# Patient Record
Sex: MALE | Race: WHITE | NOT HISPANIC OR LATINO | Employment: OTHER | URBAN - METROPOLITAN AREA
[De-identification: names, ages, dates, MRNs, and addresses within clinical notes are randomized per-mention and may not be internally consistent; named-entity substitution may affect disease eponyms.]

---

## 2017-01-11 ENCOUNTER — ANESTHESIA EVENT (OUTPATIENT)
Dept: SURGERY | Facility: HOSPITAL | Age: 42
End: 2017-01-11
Payer: COMMERCIAL

## 2017-01-11 ENCOUNTER — TELEPHONE (OUTPATIENT)
Dept: SURGERY | Facility: CLINIC | Age: 42
End: 2017-01-11

## 2017-01-11 NOTE — ANESTHESIA PREPROCEDURE EVALUATION
01/11/2017  Ryder Mendoza is a 42 y.o., male with a pre-operative diagnosis of Right inguinal hernia [K40.90] who is scheduled for Procedure(s) (LRB):  REPAIR-HERNIA-INGUINAL Open Right with Mesh (Right).     Requested anesthesia type: General  Surgeon: Deniz Ontiveros MD  Allergies: Review of patient's allergies indicates:  No Known Allergies  Vital Sign Range:    Chronic Medications:   No prescriptions prior to admission.     Current Medications:   No current facility-administered medications for this encounter.      No current outpatient prescriptions on file.     Medical History:   Past Medical History   Diagnosis Date    Cataract     Diarrhea 2015     c-diff    Lattice degeneration, both eyes     MRSA (methicillin resistant staph aureus) culture positive     Stickler's syndrome      .    [facility] Anesthesia Evaluation    I have reviewed the Patient Summary Reports.    I have reviewed the Nursing Notes.   I have reviewed the Medications.     Review of Systems  Anesthesia Hx:  No problems with previous Anesthesia  Denies Family Hx of Anesthesia complications.   Denies Personal Hx of Anesthesia complications.   Hematology/Oncology:  Hematology Normal   Oncology Normal     EENT/Dental:EENT/Dental Normal   Cardiovascular:  Cardiovascular Normal     Pulmonary:  Pulmonary Normal    Renal/:  Renal/ Normal     Hepatic/GI:  Hepatic/GI Normal    Musculoskeletal:   Arthritis     Neurological:  Neurology Normal    Endocrine:  Endocrine Normal    Dermatological:  Skin Normal    Psych:  Psychiatric Normal           Physical Exam  General:  Well nourished    Airway/Jaw/Neck:  Airway Findings: Mouth Opening: Normal Tongue: Normal  General Airway Assessment: Adult, Good  Mallampati: I  Improves to I with phonation.  Jaw/Neck Findings:     Neck ROM: Normal ROM      Dental:  Dental Findings: In tact    Chest/Lungs:  Chest/Lungs Findings: Clear to auscultation, Normal Respiratory Rate     Heart/Vascular:  Heart Findings: Rate: Normal  Rhythm: Regular Rhythm  Sounds: Normal     Abdomen:  Abdomen Findings:  Normal, Soft, Nontender            Anesthesia Plan  Type of Anesthesia, risks & benefits discussed:  Anesthesia Type:  general  Patient's Preference: as indicated.  Intra-op Monitoring Plan: standard ASA monitors  Intra-op Monitoring Plan Comments:   Post Op Pain Control Plan:   Post Op Pain Control Plan Comments:   Induction:   IV  Beta Blocker:  Patient is not currently on a Beta-Blocker (No further documentation required).       Informed Consent: Patient understands risks and agrees with Anesthesia plan.  Questions answered. Anesthesia consent signed with patient.  ASA Score: 2     Day of Surgery Review of History & Physical:  There are no significant changes.  H&P update referred to the surgeon.     Anesthesia Plan Notes: Perioperative PONV plan of care developed.         Ready For Surgery From Anesthesia Perspective.

## 2017-01-11 NOTE — TELEPHONE ENCOUNTER
Pt notified to arrive [facility] tomorrow at 7:30am for surgery with Dr. Ontiveros.  Pre-Op instructions reinforced.  He verbalized understanding.

## 2017-01-12 ENCOUNTER — ANESTHESIA (OUTPATIENT)
Dept: SURGERY | Facility: HOSPITAL | Age: 42
End: 2017-01-12
Payer: COMMERCIAL

## 2017-01-12 PROBLEM — K40.90 RIGHT INGUINAL HERNIA: Status: ACTIVE | Noted: 2017-01-12

## 2017-01-12 PROCEDURE — 63600175 PHARM REV CODE 636 W HCPCS: Performed by: NURSE ANESTHETIST, CERTIFIED REGISTERED

## 2017-01-12 PROCEDURE — 25000003 PHARM REV CODE 250: Performed by: ANESTHESIOLOGY

## 2017-01-12 PROCEDURE — 25000003 PHARM REV CODE 250: Performed by: PHYSICIAN ASSISTANT

## 2017-01-12 PROCEDURE — 25000003 PHARM REV CODE 250: Performed by: NURSE ANESTHETIST, CERTIFIED REGISTERED

## 2017-01-12 PROCEDURE — D9220A PRA ANESTHESIA: Mod: CRNA,,, | Performed by: NURSE ANESTHETIST, CERTIFIED REGISTERED

## 2017-01-12 PROCEDURE — D9220A PRA ANESTHESIA: Mod: ANES,,, | Performed by: ANESTHESIOLOGY

## 2017-01-12 PROCEDURE — 27200651 HC AIRWAY, LMA: Performed by: NURSE ANESTHETIST, CERTIFIED REGISTERED

## 2017-01-12 RX ORDER — PROPOFOL 10 MG/ML
INJECTION, EMULSION INTRAVENOUS
Status: DISCONTINUED | OUTPATIENT
Start: 2017-01-12 | End: 2017-01-12

## 2017-01-12 RX ORDER — FENTANYL CITRATE 50 UG/ML
INJECTION, SOLUTION INTRAMUSCULAR; INTRAVENOUS
Status: DISCONTINUED | OUTPATIENT
Start: 2017-01-12 | End: 2017-01-12

## 2017-01-12 RX ORDER — GLYCOPYRROLATE 0.2 MG/ML
INJECTION INTRAMUSCULAR; INTRAVENOUS
Status: DISCONTINUED | OUTPATIENT
Start: 2017-01-12 | End: 2017-01-12

## 2017-01-12 RX ORDER — ONDANSETRON 2 MG/ML
INJECTION INTRAMUSCULAR; INTRAVENOUS
Status: DISCONTINUED | OUTPATIENT
Start: 2017-01-12 | End: 2017-01-12

## 2017-01-12 RX ORDER — MIDAZOLAM HYDROCHLORIDE 1 MG/ML
INJECTION, SOLUTION INTRAMUSCULAR; INTRAVENOUS
Status: DISCONTINUED | OUTPATIENT
Start: 2017-01-12 | End: 2017-01-12

## 2017-01-12 RX ORDER — DEXAMETHASONE SODIUM PHOSPHATE 4 MG/ML
INJECTION, SOLUTION INTRA-ARTICULAR; INTRALESIONAL; INTRAMUSCULAR; INTRAVENOUS; SOFT TISSUE
Status: DISCONTINUED | OUTPATIENT
Start: 2017-01-12 | End: 2017-01-12

## 2017-01-12 RX ORDER — LIDOCAINE HCL/PF 100 MG/5ML
SYRINGE (ML) INTRAVENOUS
Status: DISCONTINUED | OUTPATIENT
Start: 2017-01-12 | End: 2017-01-12

## 2017-01-12 RX ADMIN — DEXAMETHASONE SODIUM PHOSPHATE 8 MG: 4 INJECTION, SOLUTION INTRAMUSCULAR; INTRAVENOUS at 08:01

## 2017-01-12 RX ADMIN — GLYCOPYRROLATE 0.2 MG: 0.2 INJECTION, SOLUTION INTRAMUSCULAR; INTRAVENOUS at 08:01

## 2017-01-12 RX ADMIN — FENTANYL CITRATE 50 MCG: 50 INJECTION, SOLUTION INTRAMUSCULAR; INTRAVENOUS at 09:01

## 2017-01-12 RX ADMIN — MIDAZOLAM HYDROCHLORIDE 2 MG: 1 INJECTION, SOLUTION INTRAMUSCULAR; INTRAVENOUS at 08:01

## 2017-01-12 RX ADMIN — Medication 2 G: at 08:01

## 2017-01-12 RX ADMIN — LIDOCAINE HYDROCHLORIDE 100 MG: 20 INJECTION, SOLUTION INTRAVENOUS at 08:01

## 2017-01-12 RX ADMIN — PROPOFOL 200 MG: 10 INJECTION, EMULSION INTRAVENOUS at 08:01

## 2017-01-12 RX ADMIN — FENTANYL CITRATE 50 MCG: 50 INJECTION, SOLUTION INTRAMUSCULAR; INTRAVENOUS at 08:01

## 2017-01-12 RX ADMIN — ONDANSETRON 4 MG: 2 INJECTION INTRAMUSCULAR; INTRAVENOUS at 09:01

## 2017-01-12 RX ADMIN — SODIUM CHLORIDE, SODIUM ACETATE ANHYDROUS, SODIUM GLUCONATE, POTASSIUM CHLORIDE, AND MAGNESIUM CHLORIDE: 526; 222; 502; 37; 30 INJECTION, SOLUTION INTRAVENOUS at 09:01

## 2017-01-12 NOTE — ANESTHESIA POSTPROCEDURE EVALUATION
"Anesthesia Post Evaluation    Patient: Ryder Mendoza    Procedure(s) Performed: Procedure(s) (LRB):  REPAIR-HERNIA-INGUINAL Open Right with Mesh (Right)    Final Anesthesia Type: general  Patient participation: Yes- Able to Participate  Level of consciousness: awake and alert and oriented  Post-procedure vital signs: reviewed and stable  Pain management: adequate  Airway patency: patent  PONV status at discharge: No PONV  Anesthetic complications: no      Cardiovascular status: stable  Respiratory status: unassisted, spontaneous ventilation and room air  Hydration status: euvolemic  Follow-up not needed.        Visit Vitals    BP (!) 118/59    Pulse 62    Temp 36.6 °C (97.8 °F) (Oral)    Resp 11    Ht 6' 2" (1.88 m)    Wt 99.8 kg (220 lb)    SpO2 96%    BMI 28.25 kg/m2       Pain/Nydia Score: Pain Assessment Performed: Yes (1/12/2017 10:45 AM)  Presence of Pain: complains of pain/discomfort (1/12/2017 10:45 AM)  Pain Rating Prior to Med Admin: 5 (1/12/2017 10:25 AM)  Pain Rating Post Med Admin: 3 (1/12/2017 10:45 AM)  Nydia Score: 10 (1/12/2017 10:45 AM)  Modified Nydia Score: 20 (1/12/2017  8:00 AM)      "

## 2017-01-12 NOTE — ANESTHESIA RELEASE NOTE
"Anesthesia Release from PACU Note    Patient: Ryder Mendoza    Procedure(s) Performed: Procedure(s) (LRB):  REPAIR-HERNIA-INGUINAL Open Right with Mesh (Right)    Anesthesia type: GEN    Post pain: Adequate analgesia reported    Post assessment: no apparent anesthetic complications, tolerated procedure well and no evidence of recall    Post vital signs:   Visit Vitals    BP (!) 118/59    Pulse 62    Temp 36.6 °C (97.8 °F) (Oral)    Resp 11    Ht 6' 2" (1.88 m)    Wt 99.8 kg (220 lb)    SpO2 96%    BMI 28.25 kg/m2       Level of consciousness: awake, alert and oriented    Nausea/Vomiting: no nausea/no vomiting    Complications: none    Airway Patency: patent    Respiratory: unassisted, spontaneous ventilation, room air    Cardiovascular: stable and blood pressure at baseline    Hydration: euvolemic    "

## 2017-01-12 NOTE — TRANSFER OF CARE
"Anesthesia Transfer of Care Note    Patient: Ryder Mendoza    Procedure(s) Performed: Procedure(s) (LRB):  REPAIR-HERNIA-INGUINAL Open Right with Mesh (Right)    Patient location: PACU    Anesthesia Type: general    Transport from OR: Transported from OR on 100% O2 by closed face mask with adequate spontaneous ventilation    Post pain: adequate analgesia    Post assessment: no apparent anesthetic complications and tolerated procedure well    Post vital signs: stable    Level of consciousness: awake, alert and oriented    Nausea/Vomiting: no nausea/vomiting    Complications: none          Last vitals:   Visit Vitals    /68    Pulse 78    Temp 36.6 °C (97.9 °F) (Oral)    Resp 10    Ht 6' 2" (1.88 m)    Wt 99.8 kg (220 lb)    SpO2 98%    BMI 28.25 kg/m2     "

## 2017-01-25 ENCOUNTER — OFFICE VISIT (OUTPATIENT)
Dept: SURGERY | Facility: CLINIC | Age: 42
End: 2017-01-25
Payer: COMMERCIAL

## 2017-01-25 ENCOUNTER — PATIENT MESSAGE (OUTPATIENT)
Dept: INTERNAL MEDICINE | Facility: CLINIC | Age: 42
End: 2017-01-25

## 2017-01-25 VITALS
WEIGHT: 224.88 LBS | DIASTOLIC BLOOD PRESSURE: 76 MMHG | HEART RATE: 64 BPM | HEIGHT: 74 IN | SYSTOLIC BLOOD PRESSURE: 112 MMHG | TEMPERATURE: 98 F | BODY MASS INDEX: 28.86 KG/M2

## 2017-01-25 DIAGNOSIS — Z87.19 S/P INGUINAL HERNIA REPAIR USING SYNTHETIC PATCH: Primary | ICD-10-CM

## 2017-01-25 DIAGNOSIS — Z98.890 S/P INGUINAL HERNIA REPAIR USING SYNTHETIC PATCH: Primary | ICD-10-CM

## 2017-01-25 PROCEDURE — 99999 PR PBB SHADOW E&M-EST. PATIENT-LVL III: CPT | Mod: PBBFAC,,, | Performed by: SURGERY

## 2017-01-25 PROCEDURE — 99024 POSTOP FOLLOW-UP VISIT: CPT | Mod: S$GLB,,, | Performed by: SURGERY

## 2017-01-25 NOTE — PROGRESS NOTES
Patient is 2 weeks s/p right inguinal hernia repair.  Doing well    Incision healed,  Repair strong.   Plan:  Light duty x 4 weeks    F/u prn.

## 2017-02-08 ENCOUNTER — OFFICE VISIT (OUTPATIENT)
Dept: INTERNAL MEDICINE | Facility: CLINIC | Age: 42
End: 2017-02-08
Attending: INTERNAL MEDICINE
Payer: COMMERCIAL

## 2017-02-08 VITALS
HEIGHT: 74 IN | BODY MASS INDEX: 29.14 KG/M2 | HEART RATE: 84 BPM | SYSTOLIC BLOOD PRESSURE: 114 MMHG | WEIGHT: 227.06 LBS | OXYGEN SATURATION: 96 % | DIASTOLIC BLOOD PRESSURE: 76 MMHG

## 2017-02-08 DIAGNOSIS — H91.90 HEARING LOSS, UNSPECIFIED HEARING LOSS TYPE, UNSPECIFIED LATERALITY: ICD-10-CM

## 2017-02-08 DIAGNOSIS — F33.0 MILD EPISODE OF RECURRENT MAJOR DEPRESSIVE DISORDER: ICD-10-CM

## 2017-02-08 DIAGNOSIS — R41.840 POOR CONCENTRATION: Primary | ICD-10-CM

## 2017-02-08 DIAGNOSIS — F41.9 ANXIETY: ICD-10-CM

## 2017-02-08 PROBLEM — K40.90 RIGHT INGUINAL HERNIA: Status: RESOLVED | Noted: 2017-01-12 | Resolved: 2017-02-08

## 2017-02-08 PROCEDURE — 99213 OFFICE O/P EST LOW 20 MIN: CPT | Mod: S$GLB,,, | Performed by: INTERNAL MEDICINE

## 2017-02-08 PROCEDURE — 99999 PR PBB SHADOW E&M-EST. PATIENT-LVL III: CPT | Mod: PBBFAC,,, | Performed by: INTERNAL MEDICINE

## 2017-02-08 RX ORDER — ESCITALOPRAM OXALATE 10 MG/1
10 TABLET ORAL DAILY
Qty: 30 TABLET | Refills: 1 | Status: SHIPPED | OUTPATIENT
Start: 2017-02-08 | End: 2017-03-22 | Stop reason: SDUPTHER

## 2017-02-08 NOTE — PROGRESS NOTES
Subjective:       Patient ID: Ryder Mendoza is a 42 y.o. male.    Chief Complaint: Medication Refill    HPI   Pt here for f/u appt for poor concentration and depression. Reports present for past 10 years. Seeing a SW/therapist x 3 years and attending group therapy which has helped tremendously. Symptoms include Lack of motivation and irritability at times.  Source of stress with children and not working currently as post op from hernia repair.   Has had mild anxiety - no panic attacks. Mostly in social situations.   Has discussed symptoms with family members including wife who is a physician.    Symptoms are stable but he decided to move forward with considering medication to help treat symptoms.   Was on wellbutrin for a short period of time - no effects from this but did not notice any benefit and stopped this after taking for about 3-4 weeks.   No si/hi/clarke.     Review of Systems   Constitutional: Negative for chills and fever.   Respiratory: Negative for chest tightness and shortness of breath.    Cardiovascular: Negative for chest pain and palpitations.   Psychiatric/Behavioral: Positive for decreased concentration and dysphoric mood. Negative for confusion, hallucinations, self-injury, sleep disturbance and suicidal ideas. The patient is nervous/anxious. The patient is not hyperactive.        Objective:      Physical Exam   Constitutional: He is oriented to person, place, and time. He appears well-developed and well-nourished.   HENT:   Head: Normocephalic and atraumatic.   Eyes: Conjunctivae and EOM are normal.   Neck: Neck supple.   Cardiovascular: Normal rate, regular rhythm, normal heart sounds and intact distal pulses.    Pulmonary/Chest: Effort normal and breath sounds normal.   Abdominal: Soft. Bowel sounds are normal.   Musculoskeletal: He exhibits no edema or tenderness.   Lymphadenopathy:     He has no cervical adenopathy.   Neurological: He is alert and oriented to person, place, and time.    Skin: Skin is warm and dry.   Psychiatric: He has a normal mood and affect. His behavior is normal. Judgment and thought content normal.   Vitals reviewed.      Assessment:       Ryder was seen today for medication refill.    Diagnoses and all orders for this visit:    Poor concentration: suspect due to problem 2 and 3    Mild episode of recurrent major depressive disorder: cont counseling, start lexapro. Potential side effects of medication discussed with patient. If the patient has any issues with medication, patient  understands to let me know. Return to clinic and ER prompts given. rtc in 4-6 weeks for med check or sooner prn.     Anxiety: as above    Hearing loss, unspecified hearing loss type, unspecified laterality  -     Ambulatory Referral to ENT    Other orders  -     escitalopram oxalate (LEXAPRO) 10 MG tablet; Take 1 tablet (10 mg total) by mouth once daily.

## 2017-02-08 NOTE — MR AVS SNAPSHOT
[facility]  [address]  [place] [address]  Phone: [phone]  Fax: [phone]                  Ryder Mendoza   2017 1:20 PM   Office Visit    Description:  Male : 1975   Provider:  Zoie Corey MD   Department:  [facility] [place], operated by [facility]           Reason for Visit     Medication Refill           Diagnoses this Visit        Comments    Poor concentration    -  Primary     Mild episode of recurrent major depressive disorder         Anxiety         Hearing loss, unspecified hearing loss type, unspecified laterality                To Do List           Future Appointments        Provider Department Dept Phone    3/22/2017 1:00 PM Zoie Corey MD [facility] Medicine [phone]    3/31/2017 8:30 AM AUDIOGRAM, AUDIO [facility] - Audiology [phone]    3/31/2017 9:30 AM Foster Rosario III, MD [facility] - Otorhinolaryngology [phone]      Goals (5 Years of Data)     None      Follow-Up and Disposition     Return in about 6 weeks (around 3/22/2017), or if symptoms worsen or fail to improve.       These Medications        Disp Refills Start End    escitalopram oxalate (LEXAPRO) 10 MG tablet 30 tablet 1 2017    Take 1 tablet (10 mg total) by mouth once daily. - Oral    Pharmacy: [facility] [address] [place] [address] Ph #: [phone]         [facility] On Call     [facility] On Call Nurse Care Line -  Assistance  Registered nurses in the [facility] On Call Center provide clinical advisement, health education, appointment booking, and other advisory services.  Call for this free service at [phone].             Medications           Message regarding Medications     Verify the changes and/or additions to your medication regime listed below are the same as discussed with your clinician today.  If any of these changes or additions are incorrect, please notify your healthcare  "provider.        START taking these NEW medications        Refills    escitalopram oxalate (LEXAPRO) 10 MG tablet 1    Sig: Take 1 tablet (10 mg total) by mouth once daily.    Class: Normal    Route: Oral           Verify that the below list of medications is an accurate representation of the medications you are currently taking.  If none reported, the list may be blank. If incorrect, please contact your healthcare provider. Carry this list with you in case of emergency.           Current Medications     escitalopram oxalate (LEXAPRO) 10 MG tablet Take 1 tablet (10 mg total) by mouth once daily.           Clinical Reference Information           Your Vitals Were     BP Pulse Height Weight SpO2 BMI    114/76 (BP Location: Left arm, Patient Position: Sitting, BP Method: Manual) 84 6' 2" (1.88 m) 103 kg (227 lb 1.2 oz) 96% 29.15 kg/m2      Blood Pressure          Most Recent Value    BP  114/76      Allergies as of 2/8/2017     No Known Allergies      Immunizations Administered on Date of Encounter - 2/8/2017     None      Orders Placed During Today's Visit      Normal Orders This Visit    Ambulatory Referral to ENT       Language Assistance Services     ATTENTION: Language assistance services are available, free of charge. Please call [phone].      ATENCIÓN: Si habla español, tiene a hinojosa disposición servicios gratuitos de asistencia lingüística. Llame al [phone].     MONICA Ý: N?u b?n nói Ti?ng Vi?t, có các d?ch v? h? tr? ngôn ng? mi?n phí dành cho b?n. G?i s? [phone].         [facility] complies with applicable Federal civil rights laws and does not discriminate on the basis of race, color, national origin, age, disability, or sex.        "

## 2017-03-22 ENCOUNTER — OFFICE VISIT (OUTPATIENT)
Dept: INTERNAL MEDICINE | Facility: CLINIC | Age: 42
End: 2017-03-22
Attending: INTERNAL MEDICINE
Payer: COMMERCIAL

## 2017-03-22 VITALS
SYSTOLIC BLOOD PRESSURE: 110 MMHG | WEIGHT: 233 LBS | BODY MASS INDEX: 29.92 KG/M2 | OXYGEN SATURATION: 95 % | HEART RATE: 91 BPM | DIASTOLIC BLOOD PRESSURE: 72 MMHG

## 2017-03-22 DIAGNOSIS — R41.840 POOR CONCENTRATION: Primary | ICD-10-CM

## 2017-03-22 DIAGNOSIS — F41.1 GAD (GENERALIZED ANXIETY DISORDER): ICD-10-CM

## 2017-03-22 DIAGNOSIS — F33.0 MILD EPISODE OF RECURRENT MAJOR DEPRESSIVE DISORDER: ICD-10-CM

## 2017-03-22 PROCEDURE — 99999 PR PBB SHADOW E&M-EST. PATIENT-LVL III: CPT | Mod: PBBFAC,,, | Performed by: INTERNAL MEDICINE

## 2017-03-22 PROCEDURE — 99213 OFFICE O/P EST LOW 20 MIN: CPT | Mod: S$GLB,,, | Performed by: INTERNAL MEDICINE

## 2017-03-22 RX ORDER — ESCITALOPRAM OXALATE 10 MG/1
10 TABLET ORAL DAILY
Qty: 90 TABLET | Refills: 3 | Status: SHIPPED | OUTPATIENT
Start: 2017-03-22 | End: 2017-06-14 | Stop reason: ALTCHOICE

## 2017-03-22 NOTE — MR AVS SNAPSHOT
[facility]  [address]  [facility] [address]  Phone: [phone]  Fax: [phone]                  Ryder Mendoza   3/22/2017 1:00 PM   Office Visit    Description:  Male : 1975   Provider:  Zoie Corey MD   Department:  [facility]           Reason for Visit     Medication Refill                To Do List           Future Appointments        Provider Department Dept Phone    3/31/2017 8:30 AM AUDIOGRAM, AUDIO [facility] - Audiology [phone]    3/31/2017 9:30 AM Foster Rosario III, MD [facility] - Otorhinolaryngology [phone]      Goals (5 Years of Data)     None      Follow-Up and Disposition     Return in about 8 months (around 2017), or if symptoms worsen or fail to improve.       These Medications        Disp Refills Start End    escitalopram oxalate (LEXAPRO) 10 MG tablet 90 tablet 3 3/22/2017 3/22/2018    Take 1 tablet (10 mg total) by mouth once daily. - Oral    Pharmacy: [facility] #[id number] - [place], [place] - [address] #: [phone]         [facility] On Call     [facility] On Call Nurse Care Line -  Assistance  Registered nurses in the [facility] On Call Center provide clinical advisement, health education, appointment booking, and other advisory services.  Call for this free service at [phone].             Medications           Message regarding Medications     Verify the changes and/or additions to your medication regime listed below are the same as discussed with your clinician today.  If any of these changes or additions are incorrect, please notify your healthcare provider.             Verify that the below list of medications is an accurate representation of the medications you are currently taking.  If none reported, the list may be blank. If incorrect, please contact your healthcare provider. Carry this list with you in case of emergency.           Current Medications     escitalopram oxalate  (LEXAPRO) 10 MG tablet Take 1 tablet (10 mg total) by mouth once daily.           Clinical Reference Information           Your Vitals Were     BP Pulse Weight SpO2 BMI    110/72 (BP Location: Left arm, Patient Position: Sitting, BP Method: Manual) 91 105.7 kg (233 lb 0.4 oz) 95% 29.92 kg/m2      Blood Pressure          Most Recent Value    BP  110/72      Allergies as of 3/22/2017     No Known Allergies      Immunizations Administered on Date of Encounter - 3/22/2017     None      Language Assistance Services     ATTENTION: Language assistance services are available, free of charge. Please call [phone].      ATENCIÓN: Si habla español, tiene a hinojosa disposición servicios gratuitos de asistencia lingüística. Llame al [phone].     CHÚ Ý: N?u b?n nói Ti?ng Vi?t, có các d?ch v? h? tr? ngôn ng? mi?n phí dành cho b?n. G?i s? [phone].         [facility] complies with applicable Federal civil rights laws and does not discriminate on the basis of race, color, national origin, age, disability, or sex.

## 2017-03-22 NOTE — PROGRESS NOTES
Subjective:       Patient ID: Ryder Mendoza is a 42 y.o. male.    Chief Complaint: Medication Refill    HPI     Pt here for med refill for lexapro started for anxiety, depression and poor concentration attributed to those issues. Is still attending counseling. anxiety markedly better. Wife and sister noticed improvement. Less irritabiilty and short tempered. mildly Dec sex drive but not bothersome at this point. Has noticed wt gain. Not as worried about what eating and recently returned from trip    Review of Systems    Objective:      Physical Exam   Constitutional: He is oriented to person, place, and time. He appears well-developed and well-nourished.   HENT:   Head: Normocephalic and atraumatic.   Eyes: Conjunctivae and EOM are normal.   Neck: Neck supple.   Cardiovascular: Normal rate, regular rhythm, normal heart sounds and intact distal pulses.    Pulmonary/Chest: Effort normal and breath sounds normal.   Musculoskeletal: He exhibits no edema or tenderness.   Lymphadenopathy:     He has no cervical adenopathy.   Neurological: He is alert and oriented to person, place, and time.   Skin: Skin is warm and dry.   Psychiatric: He has a normal mood and affect. His behavior is normal. Judgment and thought content normal.   Vitals reviewed.      Assessment:       Ryder was seen today for medication refill.    Diagnoses and all orders for this visit:    Poor concentration    SANYA (generalized anxiety disorder)    Mild episode of recurrent major depressive disorder    Other orders  -     escitalopram oxalate (LEXAPRO) 10 MG tablet; Take 1 tablet (10 mg total) by mouth once daily.    All Improved on lexapro, no si/hi/clarke. cont current med, refills given. If any issues with med in future he agrees to let me know. Cont counseling and reg exercise

## 2017-03-31 ENCOUNTER — INITIAL CONSULT (OUTPATIENT)
Dept: OTOLARYNGOLOGY | Facility: CLINIC | Age: 42
End: 2017-03-31
Payer: COMMERCIAL

## 2017-03-31 ENCOUNTER — CLINICAL SUPPORT (OUTPATIENT)
Dept: AUDIOLOGY | Facility: CLINIC | Age: 42
End: 2017-03-31
Payer: COMMERCIAL

## 2017-03-31 VITALS
DIASTOLIC BLOOD PRESSURE: 70 MMHG | HEIGHT: 74 IN | SYSTOLIC BLOOD PRESSURE: 110 MMHG | TEMPERATURE: 98 F | WEIGHT: 232.81 LBS | BODY MASS INDEX: 29.88 KG/M2 | HEART RATE: 66 BPM

## 2017-03-31 DIAGNOSIS — Q89.8 STICKLER'S SYNDROME: ICD-10-CM

## 2017-03-31 DIAGNOSIS — H90.A31 MIXED CONDUCTIVE AND SENSORINEURAL HEARING LOSS OF RIGHT EAR WITH RESTRICTED HEARING OF LEFT EAR: Primary | ICD-10-CM

## 2017-03-31 DIAGNOSIS — H90.A22 SENSORINEURAL HEARING LOSS OF LEFT EAR WITH RESTRICTED HEARING OF RIGHT EAR: ICD-10-CM

## 2017-03-31 DIAGNOSIS — Z86.69 HISTORY OF PERFORATION OF TYMPANIC MEMBRANE: ICD-10-CM

## 2017-03-31 DIAGNOSIS — H69.93 EUSTACHIAN TUBE DYSFUNCTION, BILATERAL: ICD-10-CM

## 2017-03-31 PROCEDURE — 92557 COMPREHENSIVE HEARING TEST: CPT | Mod: S$GLB,,, | Performed by: AUDIOLOGIST-HEARING AID FITTER

## 2017-03-31 PROCEDURE — 92567 TYMPANOMETRY: CPT | Mod: S$GLB,,, | Performed by: AUDIOLOGIST-HEARING AID FITTER

## 2017-03-31 PROCEDURE — 99999 PR PBB SHADOW E&M-EST. PATIENT-LVL III: CPT | Mod: PBBFAC,,, | Performed by: OTOLARYNGOLOGY

## 2017-03-31 PROCEDURE — 99999 PR PBB SHADOW E&M-EST. PATIENT-LVL I: CPT | Mod: PBBFAC,,,

## 2017-03-31 PROCEDURE — 99203 OFFICE O/P NEW LOW 30 MIN: CPT | Mod: S$GLB,,, | Performed by: OTOLARYNGOLOGY

## 2017-03-31 NOTE — LETTER
April 1, 2017      Zoie Corey MD  [address]  [facility] [address]           [facility]  [address]  [facility] [address]  Phone: [phone]  Fax: [phone]          Patient: Ryder Mendoza   MR Number: [id number]   YOB: 1975   Date of Visit: 3/31/2017       Dear Dr. Zoie Corey:    Thank you for referring Ryder Mendoza to me for evaluation. Attached you will find relevant portions of my assessment and plan of care.    If you have questions, please do not hesitate to call me. I look forward to following Ryder Mendoza along with you.    Sincerely,    Foster Rosario III, MD    Enclosure  CC:  No Recipients    If you would like to receive this communication electronically, please contact [email or web address] or [phone] to request more information on [facility] Link access.    For providers and/or their staff who would like to refer a patient to [facility], please contact us through our one-stop-shop provider referral line, [facility], at [phone].    If you feel you have received this communication in error or would no longer like to receive these types of communications, please e-mail [email or web address]
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
## 2017-03-31 NOTE — MR AVS SNAPSHOT
[facility] - Otorhinolaryngology  [address]  [facility] [address]  Phone: [phone]  Fax: [phone]                  Ryder Mendoza   3/31/2017 9:30 AM   Initial consult    Description:  Male : 1975   Provider:  Foster Rosario III, MD   Department:  [facility] - Otorhinolaryngology           Diagnoses this Visit        Comments    Mixed conductive and sensorineural hearing loss of right ear with restricted hearing of left ear    -  Primary     Sensorineural hearing loss of left ear with restricted hearing of right ear         History of perforation of tympanic membrane         Eustachian tube dysfunction, bilateral         Stickler's syndrome                To Do List           Goals (5 Years of Data)     None      [facility]     [facility] On Call Nurse Care Line -  Assistance  Unless otherwise directed by your provider, please contact [facility], our nurse care line that is available for  assistance.     Registered nurses in the [facility] On Call Center provide: appointment scheduling, clinical advisement, health education, and other advisory services.  Call: [phone] (toll free)               Medications           Message regarding Medications     Verify the changes and/or additions to your medication regime listed below are the same as discussed with your clinician today.  If any of these changes or additions are incorrect, please notify your healthcare provider.             Verify that the below list of medications is an accurate representation of the medications you are currently taking.  If none reported, the list may be blank. If incorrect, please contact your healthcare provider. Carry this list with you in case of emergency.           Current Medications     escitalopram oxalate (LEXAPRO) 10 MG tablet Take 1 tablet (10 mg total) by mouth once daily.           Clinical Reference Information           Your Vitals Were     BP Pulse Temp    110/70 (BP  "Location: Left arm, Patient Position: Sitting, BP Method: Automatic) 66 98.3 °F (36.8 °C) (Tympanic)    Height Weight BMI    6' 2" (1.88 m) 105.6 kg (232 lb 12.9 oz) 29.89 kg/m2      Blood Pressure          Most Recent Value    BP  110/70      Allergies as of 3/31/2017     No Known Allergies      Immunizations Administered on Date of Encounter - 3/31/2017     None      Instructions    Audiometry reviewed: asymmetric AD > AS HL  Pt. is a candidate for hearing amplification for one ( right) or both ears  Copy of audiogram/MDamon BianchiNiall's card/Rx to obtain hearing aid(s) provided  Gentle middle ear insufflation techniques encouraged; E.T. Literature provided  Consultation with Dr. SANTIAGO Cowart encouraged re: AD status prn  Avoid diving/forceful nose blowing  Old records from Dr. Kearney may be helpful  Monitor hearing yearly           Language Assistance Services     ATTENTION: Language assistance services are available, free of charge. Please call [phone].      ATENCIÓN: Si habla rasheed, tiene a hinojosa disposición servicios gratuitos de asistencia lingüística. Llame al [phone].     MONICA Ý: N?u b?n nói Ti?ng Vi?t, có các d?ch v? h? tr? ngôn ng? mi?n phí dành cho b?n. G?i s? [phone].         [facility] complies with applicable Federal civil rights laws and does not discriminate on the basis of race, color, national origin, age, disability, or sex.        "

## 2017-03-31 NOTE — PATIENT INSTRUCTIONS
Audiometry reviewed: asymmetric AD > AS HL  Pt. is a candidate for hearing amplification for one ( right) or both ears  Copy of audiogram/ELIZABETH Tierney's card/Rx to obtain hearing aid(s) provided  Gentle middle ear insufflation techniques encouraged; E.T. Literature provided  Consultation with Dr. SANTIAGO Cowart encouraged re: AD status prn  Avoid diving/forceful nose blowing  Old records from Dr. Kearney may be helpful  Monitor hearing yearly

## 2017-03-31 NOTE — PROGRESS NOTES
Ryder Mendoza was seen in the clinic today for a hearing evaluation. Mr. Mendoza reported hearing loss in his right ear.    Audiological testing revealed a mild to moderate mid to high frequency sensorineural hearing loss in the left ear and a mild to moderate mixed hearing loss in the right ear. A speech reception threshold was obtained at 25 dBHL in the left ear and 40 dBHL in the right ear. Speech discrimination was 100% in the left ear and 92% in the right ear.    Tympanometry revealed hyper-compliant Type A tympanograms in both ears.    Recommendations:  1. Otologic evaluation  2. Annual hearing evaluation  3. Hearing Aid Consult

## 2017-04-24 ENCOUNTER — PATIENT MESSAGE (OUTPATIENT)
Dept: INTERNAL MEDICINE | Facility: CLINIC | Age: 42
End: 2017-04-24

## 2017-04-27 RX ORDER — BUPROPION HYDROCHLORIDE 150 MG/1
150 TABLET ORAL DAILY
Qty: 30 TABLET | Refills: 1 | Status: SHIPPED | OUTPATIENT
Start: 2017-04-27 | End: 2017-06-14 | Stop reason: SDUPTHER

## 2017-04-27 NOTE — TELEPHONE ENCOUNTER
Called and spoke with pt - lexapro effective for depression and SANAY symptoms however reports sx of dec libido - he is attending counseling. Would like to add wellbutrin to see if helps sx and will consider d/c lexapro in 4 weeks if dec libido symptoms persist and mood is still improved with addition of wellbutrin.   Potential side effects of wellbutrin discussed with patient. If the patient has any issues with medication, patient  understands to let me know. Return to clinic and ER prompts given. If doing well on both meds he will rtc in 4 weeks for med check - if still with dec libido he will stop lexapro in 4 weeks and rtc 4 weeks after or sooner for f/u for med check on just wellbutrin tx. All questions were answered and pt verbalized understanding of plan.

## 2017-06-14 ENCOUNTER — PATIENT MESSAGE (OUTPATIENT)
Dept: INTERNAL MEDICINE | Facility: CLINIC | Age: 42
End: 2017-06-14

## 2017-06-14 RX ORDER — BUPROPION HYDROCHLORIDE 150 MG/1
150 TABLET ORAL DAILY
Qty: 30 TABLET | Refills: 6 | Status: SHIPPED | OUTPATIENT
Start: 2017-06-14 | End: 2017-08-25

## 2017-06-28 RX ORDER — BUPROPION HYDROCHLORIDE 150 MG/1
TABLET ORAL
Qty: 30 TABLET | Refills: 1 | Status: SHIPPED | OUTPATIENT
Start: 2017-06-28 | End: 2017-09-21 | Stop reason: SDUPTHER

## 2017-07-24 ENCOUNTER — PATIENT MESSAGE (OUTPATIENT)
Dept: OTOLARYNGOLOGY | Facility: CLINIC | Age: 42
End: 2017-07-24

## 2017-08-25 ENCOUNTER — OFFICE VISIT (OUTPATIENT)
Dept: OTOLARYNGOLOGY | Facility: CLINIC | Age: 42
End: 2017-08-25
Payer: COMMERCIAL

## 2017-08-25 ENCOUNTER — CLINICAL SUPPORT (OUTPATIENT)
Dept: AUDIOLOGY | Facility: CLINIC | Age: 42
End: 2017-08-25
Payer: COMMERCIAL

## 2017-08-25 VITALS — HEIGHT: 74 IN | WEIGHT: 240.31 LBS | BODY MASS INDEX: 30.84 KG/M2

## 2017-08-25 DIAGNOSIS — H90.6 MIXED HEARING LOSS, BILATERAL: Primary | ICD-10-CM

## 2017-08-25 DIAGNOSIS — H90.6 MIXED CONDUCTIVE AND SENSORINEURAL HEARING LOSS OF BOTH EARS: Primary | ICD-10-CM

## 2017-08-25 PROCEDURE — 99999 PR PBB SHADOW E&M-EST. PATIENT-LVL II: CPT | Mod: PBBFAC,,, | Performed by: OTOLARYNGOLOGY

## 2017-08-25 PROCEDURE — 3008F BODY MASS INDEX DOCD: CPT | Mod: S$GLB,,, | Performed by: OTOLARYNGOLOGY

## 2017-08-25 PROCEDURE — 92567 TYMPANOMETRY: CPT | Mod: S$GLB,,, | Performed by: AUDIOLOGIST

## 2017-08-25 PROCEDURE — 92504 EAR MICROSCOPY EXAMINATION: CPT | Mod: S$GLB,,, | Performed by: OTOLARYNGOLOGY

## 2017-08-25 PROCEDURE — 92557 COMPREHENSIVE HEARING TEST: CPT | Mod: S$GLB,,, | Performed by: AUDIOLOGIST

## 2017-08-25 PROCEDURE — 99214 OFFICE O/P EST MOD 30 MIN: CPT | Mod: S$GLB,,, | Performed by: OTOLARYNGOLOGY

## 2017-08-25 NOTE — PROGRESS NOTES
Mr. Mendoza was seen in the clinic today for a hearing evaluation.  He reported that he feels like the hearing in his right ear is worsening.     Audiological testing revealed a mild to moderately severe mixed hearing loss in the right ear and a mild mixed hearing loss in the left ear. A speech reception threshold was obtained at 40 dBHL in the right ear and 25 dBHL in the left ear. Speech discrimination was 96% in the right ear and 100% in the left ear.    Tympanometry revealed a Type A tympanogram in the right ear and a Type Ad tympanogram in the left ear.    Recommendations:  1. Otologic evaluation  2. Annual hearing evaluation  3. Noise protection  4. Hearing aid consultation, pending otologic evaluation
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
## 2017-09-20 ENCOUNTER — PATIENT MESSAGE (OUTPATIENT)
Dept: INTERNAL MEDICINE | Facility: CLINIC | Age: 42
End: 2017-09-20

## 2017-09-21 RX ORDER — BUPROPION HYDROCHLORIDE 150 MG/1
150 TABLET ORAL DAILY
Qty: 90 TABLET | Refills: 2 | Status: SHIPPED | OUTPATIENT
Start: 2017-09-21 | End: 2017-12-19 | Stop reason: SDUPTHER

## 2017-12-19 ENCOUNTER — OFFICE VISIT (OUTPATIENT)
Dept: INTERNAL MEDICINE | Facility: CLINIC | Age: 42
End: 2017-12-19
Attending: INTERNAL MEDICINE
Payer: COMMERCIAL

## 2017-12-19 VITALS
SYSTOLIC BLOOD PRESSURE: 110 MMHG | BODY MASS INDEX: 30.1 KG/M2 | DIASTOLIC BLOOD PRESSURE: 78 MMHG | WEIGHT: 234.56 LBS | HEIGHT: 74 IN | OXYGEN SATURATION: 96 % | HEART RATE: 65 BPM

## 2017-12-19 DIAGNOSIS — F33.0 MILD EPISODE OF RECURRENT MAJOR DEPRESSIVE DISORDER: ICD-10-CM

## 2017-12-19 DIAGNOSIS — F41.1 GAD (GENERALIZED ANXIETY DISORDER): ICD-10-CM

## 2017-12-19 DIAGNOSIS — Z00.00 ANNUAL PHYSICAL EXAM: Primary | ICD-10-CM

## 2017-12-19 DIAGNOSIS — M25.561 RIGHT KNEE PAIN, UNSPECIFIED CHRONICITY: ICD-10-CM

## 2017-12-19 DIAGNOSIS — M76.61 TENDONITIS, ACHILLES, RIGHT: ICD-10-CM

## 2017-12-19 DIAGNOSIS — R41.840 POOR CONCENTRATION: ICD-10-CM

## 2017-12-19 PROCEDURE — 99999 PR PBB SHADOW E&M-EST. PATIENT-LVL III: CPT | Mod: PBBFAC,,, | Performed by: INTERNAL MEDICINE

## 2017-12-19 PROCEDURE — 99396 PREV VISIT EST AGE 40-64: CPT | Mod: S$GLB,,, | Performed by: INTERNAL MEDICINE

## 2017-12-19 RX ORDER — BUPROPION HYDROCHLORIDE 150 MG/1
150 TABLET ORAL DAILY
Qty: 90 TABLET | Refills: 3 | Status: SHIPPED | OUTPATIENT
Start: 2017-12-19 | End: 2018-06-27 | Stop reason: SDUPTHER

## 2017-12-19 NOTE — PROGRESS NOTES
"Subjective:   Patient ID: Ryder Mendoza is a 42 y.o. male  Chief complaint:   Chief Complaint   Patient presents with    Annual Exam       HPI  Pt here for annual exam.   Has hx of SANYA and depression - well controlled on wellbutrin with no SE. Mood is good. Concentration improved and less irritable   Doing well, exercising    Review of Systems   Constitutional: Negative for chills and fever.   HENT: Negative for rhinorrhea and sore throat.    Eyes: Negative for pain and visual disturbance.   Respiratory: Negative for cough, shortness of breath and wheezing.    Cardiovascular: Negative for chest pain and palpitations.   Gastrointestinal: Negative for abdominal pain, constipation and diarrhea.   Genitourinary: Negative for dysuria and hematuria.   Musculoskeletal: Negative for back pain and joint swelling.   Skin: Negative for color change and rash.   Neurological: Negative for dizziness and headaches.   Psychiatric/Behavioral: Negative for decreased concentration, dysphoric mood, hallucinations, self-injury, sleep disturbance and suicidal ideas. The patient is not nervous/anxious.      Objective:  Vitals:    12/19/17 0811   BP: 110/78   Pulse: 65   SpO2: 96%   Weight: 106.4 kg (234 lb 9.1 oz)   Height: 6' 2" (1.88 m)     Body mass index is 30.12 kg/m².    Physical Exam   Constitutional: He is oriented to person, place, and time. He appears well-developed and well-nourished.   HENT:   Head: Normocephalic and atraumatic.   Right Ear: External ear normal.   Left Ear: External ear normal.   Nose: Nose normal.   Mouth/Throat: Oropharynx is clear and moist.   No carotid bruits   Eyes: Conjunctivae and EOM are normal.   Neck: Neck supple. No thyromegaly present.   Cardiovascular: Normal rate, regular rhythm, normal heart sounds and intact distal pulses.    Pulmonary/Chest: Effort normal and breath sounds normal.   Abdominal: Soft. Bowel sounds are normal.   Musculoskeletal: He exhibits no edema or tenderness. "   Lymphadenopathy:     He has no cervical adenopathy.   Neurological: He is alert and oriented to person, place, and time.   Skin: Skin is warm and dry. Capillary refill takes less than 2 seconds.   Psychiatric: His behavior is normal. Thought content normal.   Vitals reviewed.      Assessment:  1. Annual physical exam    2. SANYA (generalized anxiety disorder)    3. Mild episode of recurrent major depressive disorder    4. Poor concentration    5. Tendonitis, Achilles, right    6. Right knee pain, unspecified chronicity        Plan:  Ryder was seen today for annual exam.    Diagnoses and all orders for this visit:    SANYA (generalized anxiety disorder)  Stable, cont exercise    Mild episode of recurrent major depressive disorder  Controlled, cont wellbutrin  No si/hi/clarke    Poor concentration: improved with wellbutrin - stable    Annual physical exam  Labs ordered, Recommend daily sunscreen, cardiovascular exercise min 30 min 5 days per week. Seatbelts routinely.    Other orders  -     buPROPion (WELLBUTRIN XL) 150 MG TB24 tablet; Take 1 tablet (150 mg total) by mouth once daily.    Health Maintenance   Topic Date Due    Lipid Panel  12/05/2021    TETANUS VACCINE  12/02/2026    Influenza Vaccine  Completed

## 2017-12-21 ENCOUNTER — LAB VISIT (OUTPATIENT)
Dept: LAB | Facility: OTHER | Age: 42
End: 2017-12-21
Attending: INTERNAL MEDICINE
Payer: COMMERCIAL

## 2017-12-21 DIAGNOSIS — Z00.00 ANNUAL PHYSICAL EXAM: ICD-10-CM

## 2017-12-21 LAB
ALBUMIN SERPL BCP-MCNC: 3.8 G/DL
ALP SERPL-CCNC: 52 U/L
ALT SERPL W/O P-5'-P-CCNC: 22 U/L
ANION GAP SERPL CALC-SCNC: 7 MMOL/L
AST SERPL-CCNC: 18 U/L
BILIRUB SERPL-MCNC: 0.4 MG/DL
BUN SERPL-MCNC: 18 MG/DL
CALCIUM SERPL-MCNC: 9.1 MG/DL
CHLORIDE SERPL-SCNC: 105 MMOL/L
CHOLEST SERPL-MCNC: 215 MG/DL
CHOLEST/HDLC SERPL: 4.1 {RATIO}
CO2 SERPL-SCNC: 27 MMOL/L
CREAT SERPL-MCNC: 1.1 MG/DL
EST. GFR  (AFRICAN AMERICAN): >60 ML/MIN/1.73 M^2
EST. GFR  (NON AFRICAN AMERICAN): >60 ML/MIN/1.73 M^2
GLUCOSE SERPL-MCNC: 98 MG/DL
HDLC SERPL-MCNC: 53 MG/DL
HDLC SERPL: 24.7 %
LDLC SERPL CALC-MCNC: 141.6 MG/DL
NONHDLC SERPL-MCNC: 162 MG/DL
POTASSIUM SERPL-SCNC: 4 MMOL/L
PROT SERPL-MCNC: 6.9 G/DL
SODIUM SERPL-SCNC: 139 MMOL/L
TRIGL SERPL-MCNC: 102 MG/DL
TSH SERPL DL<=0.005 MIU/L-ACNC: 2.79 UIU/ML

## 2017-12-21 PROCEDURE — 84443 ASSAY THYROID STIM HORMONE: CPT

## 2017-12-21 PROCEDURE — 80061 LIPID PANEL: CPT

## 2017-12-21 PROCEDURE — 80053 COMPREHEN METABOLIC PANEL: CPT

## 2017-12-21 PROCEDURE — 36415 COLL VENOUS BLD VENIPUNCTURE: CPT

## 2018-01-04 ENCOUNTER — CLINICAL SUPPORT (OUTPATIENT)
Dept: REHABILITATION | Facility: HOSPITAL | Age: 43
End: 2018-01-04
Attending: INTERNAL MEDICINE
Payer: COMMERCIAL

## 2018-01-04 DIAGNOSIS — M25.571 RIGHT ANKLE PAIN, UNSPECIFIED CHRONICITY: ICD-10-CM

## 2018-01-04 DIAGNOSIS — M25.561 RIGHT KNEE PAIN, UNSPECIFIED CHRONICITY: ICD-10-CM

## 2018-01-04 PROCEDURE — 97140 MANUAL THERAPY 1/> REGIONS: CPT

## 2018-01-04 PROCEDURE — 97161 PT EVAL LOW COMPLEX 20 MIN: CPT

## 2018-01-04 NOTE — PLAN OF CARE
Physical Therapy Initial Evaluation     Name: Ryder Gamboae  Clinic Number: [id number]    Ryder is a 43 y.o. male evaluated on 01/04/2018.     Diagnosis:   Encounter Diagnoses   Name Primary?    Right knee pain, unspecified chronicity     Right ankle pain, unspecified chronicity      Physician: Zoie Corey MD  Treatment Orders: PT Eval and Treat    Past Medical History:   Diagnosis Date    Cataract     Diarrhea 2015    c-diff    Lattice degeneration, both eyes     MRSA (methicillin resistant staph aureus) culture positive     Stickler's syndrome      Current Outpatient Prescriptions   Medication Sig    buPROPion (WELLBUTRIN XL) 150 MG TB24 tablet Take 1 tablet (150 mg total) by mouth once daily.     No current facility-administered medications for this visit.      Review of patient's allergies indicates:  No Known Allergies  Precautions: standard    Time In: 8:30 am   Time Out: 9:25 am     Subjective     Patient reports in high school he played competitive soccer and had knee/ankle issues. Pt reports he did moderate exercise since then. Pt reports 3-4 years ago he started playing competitive soccer again and started noticing pain in mid calf B (R>L). Pt reports the discomfort is deep in the calf. Pt reports he stopped playing soccer, but has been exercising at [employer] and playing tennis. Pt reports the pain has returned since playing tennis and working out at [employer]. Pt reports calf muscles feel very tight int he mornings towards the lower calf/achilles tendon. Pt reports occasional sharp pains in the R anterior knee. Pt reports he recently had posterior R knee after duck walking in the attic taking down [employer] decorations. Pt reports the knee is more likely to flare up after physical activity, but can also occur with rest. No history of surgery or injections on the knee or ankle. Other co morbidities include inguinal hernia  "surgery last year, appendectomy 3 years ago, Sticklers syndrome, anxiety/depression.   Diagnostic Imaging: none  Onset: gradual  Popping/clicking: yes  Lower extremity gives way: denies  Locking episodes: denies    Pain Scale: Ryder rates pain on a scale of 0-10 to be 7 at worst; 2 currently; 0 at best .  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)  Patient Goals: minimize pain, return to PLOF    Objective     Observation: B pes planus with hallux valgus     Palpation: TTP R mid calf    Range of Motion: Ankle (degrees)  B ankle A/PROM WNL    Strength: Ankle    Left Right   Gastrocnemius 4+/5 4+/5   Soleus 5/5 5/5   Dorsiflexion 5/5 5/5   Inversion 5/5 4+/5   Eversion 5/5 5/5     Range of Motion: Knee (degrees)   Left Right   Flexion: 140 135 posterior knee pain   Extension 0 0     Strength: Knee   Left Right   Quadriceps 5/5 5/5   Hamstrings 5/5 5/5       Strength: Hip    Left Right   Iliopsoas 5/5 5/5   PGM 5/5 5/5   IR 5/5 5/5   ER 5/5 5/5   Ext 4+/5 4+/5     decreased flexibility B gastroc/soleus complex and hamstring     Special Tests: Knee  All ligamentous testing is negative    Edema:  Left: absent  Right: absent    Gait: Reji ambulated with none.  Level of Assistance: independent  Patient displays mild decrease in push off and heel strike.   Functional Tests: mild pain with R single heel raise    TREATMENT     PT Evaluation Completed? Yes  Discussed Plan of Care with patient: Yes    Ryder received 3 minutes of therapeutic exercise including:   gastroc stretch on wedge 3x30"    Ryder received 10 minutes of manual therapy including:  Instrument assisted STM to R calf and distal hamstring     Written Home Exercises Provided: gastroc stretch, soleus stretch, hamstring stretch (see handout)   Ryder demonstrated good understanding of the education provided. Patient " demonstrated good return demonstration of skill of exercises.    ASSESSMENT     History  Co-morbidities and personal factors that may impact the plan of care Examination  Body Structures and Functions, activity limitations and participation restrictions that may impact the plan of care    Clinical Presentation   Co-morbidities:   anxiety and depression  Sticklers syndrome      Personal Factors:   Anxiety/depression  Body Regions:   lower extremities    Body Systems:    gross symmetry  ROM  strength  gross coordinated movement  balance  gait            Participation Restrictions:   Pain with activities such as sports, pain at rest     Activity limitations:   Learning and applying knowledge  no deficits    General Tasks and Commands  no deficits    Communication  no deficits    Mobility  walking    Self care  no deficits    Domestic Life  no deficits    Interactions/Relationships  no deficits    Life Areas  no deficits    Community and Social Life  no deficits         stable and uncomplicated                      low   low  low Decision Making/ Complexity Score:  low     Pt presents with discomfort in R knee and B calf (R>L), decreased strength, decreased ROM, decreased flexibility, and subsequent functional limitations. Pt will benefit from PT to address these deficits. Pt tolerated tx well today with no adverse reaction to IASTYM.   Pt prognosis is Excellent.  Pt will benefit from skilled outpatient physical therapy to address the above stated deficits, provide pt/family education and to maximize pt's level of independence.     Medical necessity is demonstrated by the following IMPAIRMENTS/PROBLEMS:  1. Increased Pain  2. Decreased Segmental Mobility & Decreased ROM  3. Decreased Core & BLE strength  4. Decreased Flexibility BLE  5. Decreased Tolerance to Functional Activities    Pt's spiritual, cultural and educational needs considered and pt agreeable to plan of care and goals as stated below:     Anticipated  Barriers for physical therapy: chronic pain    Short Term GOALS: 3 weeks. Pt agrees with goals set.  1. Patient demonstrates independence with HEP.   2. Patient demonstrates independence with Postural Awareness.   3. Patient demonstrates independence with body mechanics.     Long Term GOALS: 6 weeks. Pt agrees with goals set.  1. Patient demonstrates increased R knee FLEX to 140 degrees with no pain to improve tolerance to functional activities pain free.   2. Patient demonstrates increased strength BLE's to 5/5 or greater to improve tolerance to functional activities pain free.   3. Patient demonstrates improved overall function per FOTO Knee Survey to 18% Limitation or less.     Functional Limitations Reports   Tool: FOTO Knee Survey  Score: 28% Limitation    PLAN     Outpatient physical therapy 2 times weekly to include: pt ed, hep, therapeutic exercises, neuromuscular re-education/ balance exercises, joint mobilizations, aquatic therapy and modalities prn. Cont PT for  6 weeks. Pt may be seen by PTA as part of the rehabilitation team.     Therapist: Chano Ríos, PT  1/4/2018

## 2018-01-08 ENCOUNTER — CLINICAL SUPPORT (OUTPATIENT)
Dept: REHABILITATION | Facility: HOSPITAL | Age: 43
End: 2018-01-08
Attending: INTERNAL MEDICINE
Payer: COMMERCIAL

## 2018-01-08 DIAGNOSIS — M25.571 RIGHT ANKLE PAIN, UNSPECIFIED CHRONICITY: ICD-10-CM

## 2018-01-08 DIAGNOSIS — M25.561 RIGHT KNEE PAIN, UNSPECIFIED CHRONICITY: Primary | ICD-10-CM

## 2018-01-08 PROCEDURE — 97110 THERAPEUTIC EXERCISES: CPT

## 2018-01-08 NOTE — PROGRESS NOTES
"                                                    Physical Therapy Daily Note     Name: Ryder Gamboae  Clinic Number: [id number]  Diagnosis:   Encounter Diagnoses   Name Primary?    Right knee pain, unspecified chronicity Yes    Right ankle pain, unspecified chronicity      Physician: Zoie Corey MD  Precautions: none  Visit #: 2 of 12  PTA Visit #: 1  Time In: 9:33 am  Time Out: 10:38  Total Treatment Time 1:1: 65    Evaluation Date: 1/4/18  Plan of care Expiration: 2/15/18      Subjective     Pt reports: knee and ankle ok, calf is more chronic. No pain right now  Pain Scale: Ryder rates pain at R knee/calf on a scale of 0-10 to be 0 currently.    Objective     Ryder received individual therapeutic exercises to develop strength, endurance, ROM, flexibility and core stabilization for 60 minutes including:  Bike x 5 min  Gastroc S 2x1'  Shuttle 4 cords 2x10  SL shuttle R 2 cords 2x10  Rebounder R fwd/side 20x ea.  Crabwalks RTB 1 lap  Steamboats RTB 2x10   Iso wall squat w/ RTB around LEs 3x15"  R Step up on airex 2x10    R HS S 2x1'  Bridge 2x10  B SL Clams 2x10  R Ankle DF,PF,IV,EV GTB 2x10  B Prone hip ext (focus on glut activation before HS activation) 2x10              Ryder received the following manual therapy techniques: Soft tissue Mobilization were applied to the: R gastroc for 5 minutes including:  Instrument assist soft tissue mob to R gastroc     The patient received the following direct contact modalities after being cleared for contraindications:NT        Written Home Exercises Provided: HS S  Pt demo good understanding of the education provided. Ryder demonstrated good return demonstration of activities.     Education provided re: LESVIA Soler verbalized good understanding of education provided.   No spiritual or educational barriers to learning provided    Assessment     Patient tolerated christel well w/o adverse reaction. Some hip fatigue w/ crabwalks and steamboats. Crepitus R knee " w/ step ups. VC needed to activate glut before HS w/ prone hip ext. Soft tissue restrictions medial gastroc during MT.   This is a 43 y.o. male referred to outpatient physical therapy and presents with a medical diagnosis of R knee pain and demonstrates limitations as described in the problem list. Pt prognosis is Excellent. Pt will continue to benefit from skilled outpatient physical therapy to address the deficits listed in the problem list, provide pt/family education and to maximize pt's level of independence in the home and community environment.     Goals as follows:  Short Term GOALS: 3 weeks. Pt agrees with goals set.  1. Patient demonstrates independence with HEP.   2. Patient demonstrates independence with Postural Awareness.   3. Patient demonstrates independence with body mechanics.      Long Term GOALS: 6 weeks. Pt agrees with goals set.  1. Patient demonstrates increased R knee FLEX to 140 degrees with no pain to improve tolerance to functional activities pain free.   2. Patient demonstrates increased strength BLE's to 5/5 or greater to improve tolerance to functional activities pain free.   3. Patient demonstrates improved overall function per FOTO Knee Survey to 18% Limitation or less.      Plan     Continue with established Plan of Care towards PT goals.    Therapist: Conchita Gilliland PTA  1/8/2018     Chano Ríos PT and Conchita Gilliland PTA met face to face to discuss patient's treatment plan and progress towards established goals.  Treatment will be continued as described in initial report/eval and progress notes. Patient will be seen by physical therapist every sixth visit and minimally once per month.

## 2018-01-11 ENCOUNTER — CLINICAL SUPPORT (OUTPATIENT)
Dept: REHABILITATION | Facility: HOSPITAL | Age: 43
End: 2018-01-11
Attending: INTERNAL MEDICINE
Payer: COMMERCIAL

## 2018-01-11 DIAGNOSIS — M25.571 RIGHT ANKLE PAIN, UNSPECIFIED CHRONICITY: ICD-10-CM

## 2018-01-11 DIAGNOSIS — M25.561 RIGHT KNEE PAIN, UNSPECIFIED CHRONICITY: Primary | ICD-10-CM

## 2018-01-11 NOTE — PROGRESS NOTES
"                                                    Physical Therapy Daily Note     Name: Ryder Mendoza  Clinic Number: [id number]  Diagnosis:   Encounter Diagnoses   Name Primary?    Right knee pain, unspecified chronicity Yes    Right ankle pain, unspecified chronicity      Physician: Zoie Corey MD  Precautions: none  Visit #: 3 of 12  PTA Visit #: 0  Time In: 11:00 am  Time Out: 11:55 a  Total Treatment Time 1:1: 55    Evaluation Date: 1/4/18  Plan of care Expiration: 2/15/18      Subjective     Pt reports: He is not having discomfort in the knee or ankle but does have some tightness in the hips from last visit.  Pain Scale: Ryder rates pain at R knee/calf on a scale of 0-10 to be 0 currently.    Objective     Ryder received individual therapeutic exercises to develop strength, endurance, ROM, flexibility and core stabilization for 60 minutes including:  Bike x 5 min  Gastroc S 2x1'  Shuttle 4 cords 2x10  SL shuttle R 2 cords 2x10  Rebounder R fwd/side 20x ea.  Crabwalks RTB 1 lap  Steamboats RTB 2x10   Iso wall squat w/ RTB around LEs 3x15"  R Step up on airex + L2 step 2x10    R HS S 2x1' (standing)  Bridge 2x10 - NT  B SL Clams 2x10 - NT  R Ankle DF,PF,IV,EV GTB 2x10 -NT  B Prone hip ext (focus on glut activation before HS activation) 2x10          Ryder received the following manual therapy techniques: Soft tissue Mobilization were applied to the: R gastroc for 5 minutes including:  Instrument assist soft tissue mob to R gastroc     The patient received the following direct contact modalities after being cleared for contraindications:NT        Written Home Exercises Provided: HS S  Pt demo good understanding of the education provided. Ryder demonstrated good return demonstration of activities.     Education provided re: LESVIA Soler verbalized good understanding of education provided.   No spiritual or educational barriers to learning provided    Assessment     Patient tolerated christel well " w/o adverse reaction. Pt was able to complete exercises without c/o pain or fatigue. Will continue to progress as tolerated.  This is a 43 y.o. male referred to outpatient physical therapy and presents with a medical diagnosis of R knee pain and demonstrates limitations as described in the problem list. Pt prognosis is Excellent. Pt will continue to benefit from skilled outpatient physical therapy to address the deficits listed in the problem list, provide pt/family education and to maximize pt's level of independence in the home and community environment.     Goals as follows:  Short Term GOALS: 3 weeks. Pt agrees with goals set.  1. Patient demonstrates independence with HEP.   2. Patient demonstrates independence with Postural Awareness.   3. Patient demonstrates independence with body mechanics.      Long Term GOALS: 6 weeks. Pt agrees with goals set.  1. Patient demonstrates increased R knee FLEX to 140 degrees with no pain to improve tolerance to functional activities pain free.   2. Patient demonstrates increased strength BLE's to 5/5 or greater to improve tolerance to functional activities pain free.   3. Patient demonstrates improved overall function per FOTO Knee Survey to 18% Limitation or less.      Plan     Continue with established Plan of Care towards PT goals.    Therapist: Roula Wakefield, PT  1/11/2018

## 2018-06-27 DIAGNOSIS — F32.A DEPRESSION, UNSPECIFIED DEPRESSION TYPE: Primary | ICD-10-CM

## 2018-06-27 RX ORDER — BUPROPION HYDROCHLORIDE 150 MG/1
150 TABLET ORAL DAILY
Qty: 90 TABLET | Refills: 3 | Status: SHIPPED | OUTPATIENT
Start: 2018-06-27 | End: 2018-09-28 | Stop reason: SDUPTHER

## 2018-09-17 ENCOUNTER — OFFICE VISIT (OUTPATIENT)
Dept: OTOLARYNGOLOGY | Facility: CLINIC | Age: 43
End: 2018-09-17
Payer: COMMERCIAL

## 2018-09-17 ENCOUNTER — CLINICAL SUPPORT (OUTPATIENT)
Dept: AUDIOLOGY | Facility: CLINIC | Age: 43
End: 2018-09-17
Payer: COMMERCIAL

## 2018-09-17 VITALS — WEIGHT: 240.75 LBS | HEIGHT: 74 IN | BODY MASS INDEX: 30.9 KG/M2

## 2018-09-17 DIAGNOSIS — H69.91 DYSFUNCTION OF RIGHT EUSTACHIAN TUBE: ICD-10-CM

## 2018-09-17 DIAGNOSIS — H90.A11 CONDUCTIVE HEARING LOSS OF RIGHT EAR WITH RESTRICTED HEARING OF LEFT EAR: ICD-10-CM

## 2018-09-17 DIAGNOSIS — H90.A31 MIXED CONDUCTIVE AND SENSORINEURAL HEARING LOSS OF RIGHT EAR WITH RESTRICTED HEARING OF LEFT EAR: Primary | ICD-10-CM

## 2018-09-17 PROCEDURE — 92557 COMPREHENSIVE HEARING TEST: CPT | Mod: S$GLB,,, | Performed by: AUDIOLOGIST-HEARING AID FITTER

## 2018-09-17 PROCEDURE — 92504 EAR MICROSCOPY EXAMINATION: CPT | Mod: S$GLB,,, | Performed by: OTOLARYNGOLOGY

## 2018-09-17 PROCEDURE — 99999 PR PBB SHADOW E&M-EST. PATIENT-LVL I: CPT | Mod: PBBFAC,,,

## 2018-09-17 PROCEDURE — 3008F BODY MASS INDEX DOCD: CPT | Mod: CPTII,S$GLB,, | Performed by: OTOLARYNGOLOGY

## 2018-09-17 PROCEDURE — 99999 PR PBB SHADOW E&M-EST. PATIENT-LVL II: CPT | Mod: PBBFAC,,, | Performed by: OTOLARYNGOLOGY

## 2018-09-17 PROCEDURE — 99212 OFFICE O/P EST SF 10 MIN: CPT | Mod: S$GLB,,, | Performed by: OTOLARYNGOLOGY

## 2018-09-17 NOTE — PROGRESS NOTES
Ryder Mendoza was seen in the clinic today for an annual hearing evaluation. Mr. Mendoza reported hearing loss worse in his right ear.      Audiological testing revealed a mild mid frequency sensorineural hearing loss in the left ear and a mild to moderate mixed hearing loss in the right ear. A speech reception threshold was obtained at 20 dBHL in the left ear and 35 dBHL in the right ear. Speech recognition was 100%, bilaterally.    Recommendations:  1. Otologic evaluation  2. Annual hearing evaluation  3. Hearing aid consult
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
## 2018-09-27 ENCOUNTER — PATIENT MESSAGE (OUTPATIENT)
Dept: INTERNAL MEDICINE | Facility: CLINIC | Age: 43
End: 2018-09-27

## 2018-09-28 DIAGNOSIS — F32.A DEPRESSION, UNSPECIFIED DEPRESSION TYPE: ICD-10-CM

## 2018-09-28 RX ORDER — BUPROPION HYDROCHLORIDE 150 MG/1
150 TABLET ORAL DAILY
Qty: 90 TABLET | Refills: 3 | Status: SHIPPED | OUTPATIENT
Start: 2018-09-28 | End: 2018-12-04 | Stop reason: SDUPTHER

## 2018-10-26 ENCOUNTER — IMMUNIZATION (OUTPATIENT)
Dept: PHARMACY | Facility: CLINIC | Age: 43
End: 2018-10-26
Payer: COMMERCIAL

## 2018-12-03 ENCOUNTER — PATIENT MESSAGE (OUTPATIENT)
Dept: INTERNAL MEDICINE | Facility: CLINIC | Age: 43
End: 2018-12-03

## 2018-12-03 DIAGNOSIS — F32.A DEPRESSION, UNSPECIFIED DEPRESSION TYPE: ICD-10-CM

## 2018-12-04 RX ORDER — BUPROPION HYDROCHLORIDE 150 MG/1
150 TABLET ORAL DAILY
Qty: 90 TABLET | Refills: 0 | Status: SHIPPED | OUTPATIENT
Start: 2018-12-04 | End: 2019-04-01 | Stop reason: SDUPTHER

## 2018-12-04 NOTE — TELEPHONE ENCOUNTER
Sent message to inform pt that script was sent to the pharmacy. Informed pt that annual exam is due and gave him Dr. Corey's recommendations about his insurance

## 2018-12-04 NOTE — TELEPHONE ENCOUNTER
Refill given for 90 day supply  Pt due for annual - please arrange     rec he discuss obtaining a partial or entire refill with pharmacy to determine what his insurance will allow so he can travel with prescription

## 2018-12-11 ENCOUNTER — DOCUMENTATION ONLY (OUTPATIENT)
Dept: REHABILITATION | Facility: HOSPITAL | Age: 43
End: 2018-12-11

## 2018-12-11 NOTE — DISCHARGE SUMMARY
PHYSICAL THERAPY DISCHARGE:    This patient was originally evaluated at our facility on: 1/4/18    Pt attended PT for a total of 3 Visits receiving: Manual Therapy, Therex, Postural Education, Body Mechanics Training, Home Exercise Instruction     This patient's last visit occurred on: 1/11/18    Short term goals: not met    Long term goals: not met    Pt was DC'd from our care secondary to: expiration of plan of care       Therapist: Gt Pablo, PT,DPT  12/11/2018

## 2019-04-01 DIAGNOSIS — F32.A DEPRESSION, UNSPECIFIED DEPRESSION TYPE: ICD-10-CM

## 2019-04-01 DIAGNOSIS — Z00.00 ANNUAL PHYSICAL EXAM: Primary | ICD-10-CM

## 2019-04-01 RX ORDER — BUPROPION HYDROCHLORIDE 150 MG/1
150 TABLET ORAL DAILY
Qty: 90 TABLET | Refills: 0 | Status: SHIPPED | OUTPATIENT
Start: 2019-04-01 | End: 2019-06-28 | Stop reason: SDUPTHER

## 2019-04-01 NOTE — TELEPHONE ENCOUNTER
Sent message to inform pt that script was sent to the pharmacy. Asking for day to schedule lab work

## 2019-06-27 ENCOUNTER — PATIENT MESSAGE (OUTPATIENT)
Dept: INTERNAL MEDICINE | Facility: CLINIC | Age: 44
End: 2019-06-27

## 2019-06-27 DIAGNOSIS — F32.A DEPRESSION, UNSPECIFIED DEPRESSION TYPE: ICD-10-CM

## 2019-06-28 RX ORDER — BUPROPION HYDROCHLORIDE 150 MG/1
150 TABLET ORAL DAILY
Qty: 30 TABLET | Refills: 0 | Status: SHIPPED | OUTPATIENT
Start: 2019-06-28 | End: 2019-07-22 | Stop reason: SDUPTHER

## 2019-06-28 NOTE — TELEPHONE ENCOUNTER
Mr Mendoza your prescription has been sent to the pharmacy.  You  need appt for future refills. 30 day supply sent to pharmacy. I need day and time in July to schedule appt

## 2019-07-22 DIAGNOSIS — F32.A DEPRESSION, UNSPECIFIED DEPRESSION TYPE: ICD-10-CM

## 2019-07-22 RX ORDER — BUPROPION HYDROCHLORIDE 150 MG/1
150 TABLET ORAL DAILY
Qty: 90 TABLET | Refills: 0 | Status: SHIPPED | OUTPATIENT
Start: 2019-07-22 | End: 2019-09-04 | Stop reason: SDUPTHER

## 2019-09-04 ENCOUNTER — OFFICE VISIT (OUTPATIENT)
Dept: INTERNAL MEDICINE | Facility: CLINIC | Age: 44
End: 2019-09-04
Attending: INTERNAL MEDICINE
Payer: COMMERCIAL

## 2019-09-04 VITALS
HEIGHT: 74 IN | DIASTOLIC BLOOD PRESSURE: 70 MMHG | BODY MASS INDEX: 31.1 KG/M2 | OXYGEN SATURATION: 97 % | WEIGHT: 242.31 LBS | HEART RATE: 63 BPM | SYSTOLIC BLOOD PRESSURE: 100 MMHG

## 2019-09-04 DIAGNOSIS — F41.1 GAD (GENERALIZED ANXIETY DISORDER): ICD-10-CM

## 2019-09-04 DIAGNOSIS — Z00.00 ANNUAL PHYSICAL EXAM: Primary | ICD-10-CM

## 2019-09-04 DIAGNOSIS — F32.A DEPRESSION, UNSPECIFIED DEPRESSION TYPE: ICD-10-CM

## 2019-09-04 PROCEDURE — 99999 PR PBB SHADOW E&M-EST. PATIENT-LVL III: ICD-10-PCS | Mod: PBBFAC,,, | Performed by: INTERNAL MEDICINE

## 2019-09-04 PROCEDURE — 99396 PR PREVENTIVE VISIT,EST,40-64: ICD-10-PCS | Mod: S$GLB,,, | Performed by: INTERNAL MEDICINE

## 2019-09-04 PROCEDURE — 99999 PR PBB SHADOW E&M-EST. PATIENT-LVL III: CPT | Mod: PBBFAC,,, | Performed by: INTERNAL MEDICINE

## 2019-09-04 PROCEDURE — 99396 PREV VISIT EST AGE 40-64: CPT | Mod: S$GLB,,, | Performed by: INTERNAL MEDICINE

## 2019-09-04 RX ORDER — BUPROPION HYDROCHLORIDE 150 MG/1
150 TABLET ORAL DAILY
Qty: 90 TABLET | Refills: 3 | Status: SHIPPED | OUTPATIENT
Start: 2019-09-04 | End: 2020-11-18 | Stop reason: SDUPTHER

## 2019-09-04 NOTE — PROGRESS NOTES
"Subjective:   Patient ID: Ryder Mendoza is a 44 y.o. male  Chief complaint:   Chief Complaint   Patient presents with    Annual Exam       HPI  Here for annual exam    Since last seen has new job working at [employer]     Exercising - tennis 1-2 times per week   Walking     Depression and anxiety: well controlled on current dose of wellbutrin  No si/hi/clarke    Flu vaccine: to get flu vaccine with children     Review of Systems    Objective:  Vitals:    09/04/19 1137   BP: 100/70   Pulse: 63   SpO2: 97%   Weight: 109.9 kg (242 lb 4.6 oz)   Height: 6' 2" (1.88 m)     Body mass index is 31.11 kg/m².    Physical Exam   Constitutional: He is oriented to person, place, and time. He appears well-developed and well-nourished.   HENT:   Head: Normocephalic and atraumatic.   Right Ear: External ear normal.   Left Ear: External ear normal.   Nose: Nose normal.   Mouth/Throat: Oropharynx is clear and moist.   No carotid bruits   Eyes: Conjunctivae and EOM are normal.   Neck: Neck supple. No thyromegaly present.   Cardiovascular: Normal rate, regular rhythm, normal heart sounds and intact distal pulses.   Pulmonary/Chest: Effort normal and breath sounds normal.   Abdominal: Soft. Bowel sounds are normal.   Musculoskeletal: He exhibits no edema or tenderness.   Lymphadenopathy:     He has no cervical adenopathy.   Neurological: He is alert and oriented to person, place, and time.   Skin: Skin is warm and dry.   Psychiatric: His behavior is normal. Thought content normal.   Vitals reviewed.    Assessment:  1. Annual physical exam    2. Depression, unspecified depression type    3. SANYA (generalized anxiety disorder)        Plan:  Ryder was seen today for annual exam.    Diagnoses and all orders for this visit:    Annual physical exam  -     Vitamin D; Future  -     TSH; Future  -     Lipid panel; Future  -     CBC auto differential; Future  -     Comprehensive metabolic panel; Future  Recommend daily " sunscreen, cardiovascular exercise min 30 min 5 days per week. Seatbelts routinely.    Depression, unspecified depression type  -     buPROPion (WELLBUTRIN XL) 150 MG TB24 tablet; Take 1 tablet (150 mg total) by mouth once daily.  Well controlled   Cont med     SANYA (generalized anxiety disorder)  Well controlled, cont med    To get flu vaccine with children at pharmacy     Health Maintenance   Topic Date Due    Lipid Panel  12/21/2022    TETANUS VACCINE  12/02/2026

## 2019-09-11 ENCOUNTER — LAB VISIT (OUTPATIENT)
Dept: LAB | Facility: OTHER | Age: 44
End: 2019-09-11
Attending: INTERNAL MEDICINE
Payer: COMMERCIAL

## 2019-09-11 DIAGNOSIS — Z00.00 ANNUAL PHYSICAL EXAM: ICD-10-CM

## 2019-09-11 LAB
25(OH)D3+25(OH)D2 SERPL-MCNC: 30 NG/ML (ref 30–96)
ALBUMIN SERPL BCP-MCNC: 4.1 G/DL (ref 3.5–5.2)
ALP SERPL-CCNC: 48 U/L (ref 55–135)
ALT SERPL W/O P-5'-P-CCNC: 31 U/L (ref 10–44)
ANION GAP SERPL CALC-SCNC: 8 MMOL/L (ref 8–16)
AST SERPL-CCNC: 23 U/L (ref 10–40)
BASOPHILS # BLD AUTO: 0.03 K/UL (ref 0–0.2)
BASOPHILS NFR BLD: 0.6 % (ref 0–1.9)
BILIRUB SERPL-MCNC: 0.5 MG/DL (ref 0.1–1)
BUN SERPL-MCNC: 14 MG/DL (ref 6–20)
CALCIUM SERPL-MCNC: 9.7 MG/DL (ref 8.7–10.5)
CHLORIDE SERPL-SCNC: 103 MMOL/L (ref 95–110)
CHOLEST SERPL-MCNC: 240 MG/DL (ref 120–199)
CHOLEST/HDLC SERPL: 4.8 {RATIO} (ref 2–5)
CO2 SERPL-SCNC: 27 MMOL/L (ref 23–29)
CREAT SERPL-MCNC: 1.1 MG/DL (ref 0.5–1.4)
DIFFERENTIAL METHOD: NORMAL
EOSINOPHIL # BLD AUTO: 0.3 K/UL (ref 0–0.5)
EOSINOPHIL NFR BLD: 4.9 % (ref 0–8)
ERYTHROCYTE [DISTWIDTH] IN BLOOD BY AUTOMATED COUNT: 12.3 % (ref 11.5–14.5)
EST. GFR  (AFRICAN AMERICAN): >60 ML/MIN/1.73 M^2
EST. GFR  (NON AFRICAN AMERICAN): >60 ML/MIN/1.73 M^2
GLUCOSE SERPL-MCNC: 88 MG/DL (ref 70–110)
HCT VFR BLD AUTO: 44.2 % (ref 40–54)
HDLC SERPL-MCNC: 50 MG/DL (ref 40–75)
HDLC SERPL: 20.8 % (ref 20–50)
HGB BLD-MCNC: 14.7 G/DL (ref 14–18)
IMM GRANULOCYTES # BLD AUTO: 0 K/UL (ref 0–0.04)
IMM GRANULOCYTES NFR BLD AUTO: 0 % (ref 0–0.5)
LDLC SERPL CALC-MCNC: 163.8 MG/DL (ref 63–159)
LYMPHOCYTES # BLD AUTO: 1.8 K/UL (ref 1–4.8)
LYMPHOCYTES NFR BLD: 34.4 % (ref 18–48)
MCH RBC QN AUTO: 30.4 PG (ref 27–31)
MCHC RBC AUTO-ENTMCNC: 33.3 G/DL (ref 32–36)
MCV RBC AUTO: 91 FL (ref 82–98)
MONOCYTES # BLD AUTO: 0.7 K/UL (ref 0.3–1)
MONOCYTES NFR BLD: 13 % (ref 4–15)
NEUTROPHILS # BLD AUTO: 2.5 K/UL (ref 1.8–7.7)
NEUTROPHILS NFR BLD: 47.1 % (ref 38–73)
NONHDLC SERPL-MCNC: 190 MG/DL
NRBC BLD-RTO: 0 /100 WBC
PLATELET # BLD AUTO: 257 K/UL (ref 150–350)
PMV BLD AUTO: 10.3 FL (ref 9.2–12.9)
POTASSIUM SERPL-SCNC: 4 MMOL/L (ref 3.5–5.1)
PROT SERPL-MCNC: 7 G/DL (ref 6–8.4)
RBC # BLD AUTO: 4.84 M/UL (ref 4.6–6.2)
SODIUM SERPL-SCNC: 138 MMOL/L (ref 136–145)
TRIGL SERPL-MCNC: 131 MG/DL (ref 30–150)
TSH SERPL DL<=0.005 MIU/L-ACNC: 2.69 UIU/ML (ref 0.4–4)
WBC # BLD AUTO: 5.32 K/UL (ref 3.9–12.7)

## 2019-09-11 PROCEDURE — 82306 VITAMIN D 25 HYDROXY: CPT

## 2019-09-11 PROCEDURE — 80053 COMPREHEN METABOLIC PANEL: CPT

## 2019-09-11 PROCEDURE — 80061 LIPID PANEL: CPT

## 2019-09-11 PROCEDURE — 36415 COLL VENOUS BLD VENIPUNCTURE: CPT

## 2019-09-11 PROCEDURE — 84443 ASSAY THYROID STIM HORMONE: CPT

## 2019-09-11 PROCEDURE — 85025 COMPLETE CBC W/AUTO DIFF WBC: CPT

## 2019-09-16 ENCOUNTER — OFFICE VISIT (OUTPATIENT)
Dept: UROLOGY | Facility: CLINIC | Age: 44
End: 2019-09-16
Payer: COMMERCIAL

## 2019-09-16 VITALS
HEIGHT: 74 IN | WEIGHT: 242.5 LBS | HEART RATE: 70 BPM | DIASTOLIC BLOOD PRESSURE: 70 MMHG | BODY MASS INDEX: 31.12 KG/M2 | SYSTOLIC BLOOD PRESSURE: 122 MMHG

## 2019-09-16 DIAGNOSIS — Z30.2 ENCOUNTER FOR MALE STERILIZATION PROCEDURE: Primary | ICD-10-CM

## 2019-09-16 PROCEDURE — 3008F BODY MASS INDEX DOCD: CPT | Mod: CPTII,S$GLB,, | Performed by: UROLOGY

## 2019-09-16 PROCEDURE — 99204 PR OFFICE/OUTPT VISIT, NEW, LEVL IV, 45-59 MIN: ICD-10-PCS | Mod: S$GLB,,, | Performed by: UROLOGY

## 2019-09-16 PROCEDURE — 3008F PR BODY MASS INDEX (BMI) DOCUMENTED: ICD-10-PCS | Mod: CPTII,S$GLB,, | Performed by: UROLOGY

## 2019-09-16 PROCEDURE — 99204 OFFICE O/P NEW MOD 45 MIN: CPT | Mod: S$GLB,,, | Performed by: UROLOGY

## 2019-09-16 PROCEDURE — 99999 PR PBB SHADOW E&M-EST. PATIENT-LVL III: ICD-10-PCS | Mod: PBBFAC,,, | Performed by: UROLOGY

## 2019-09-16 PROCEDURE — 99999 PR PBB SHADOW E&M-EST. PATIENT-LVL III: CPT | Mod: PBBFAC,,, | Performed by: UROLOGY

## 2019-09-16 RX ORDER — LIDOCAINE HYDROCHLORIDE 10 MG/ML
20 INJECTION INFILTRATION; PERINEURAL ONCE
Status: CANCELLED | OUTPATIENT
Start: 2019-09-16 | End: 2019-09-16

## 2019-09-16 NOTE — PROGRESS NOTES
Chief Complaint:   Undesired fertility    HPI:  Mr. Mendoza is a 44 y.o.  male who presents for evaluation re vasectomy. He has 2 children, ages 13 and 10 yo, and he is certain that he desires no more. He is aware of other forms of contraception.  He's currently using the pill for contraception. He is aware that vasectomy is to be considered permanent/irreversible.    He denies orchalgia.  No dysuria.  No hematuria.    ROS:  Ryder Mendoza denies headache, blurred vision, fever, nausea, vomiting, chills, flank discomfort, abdominal pain, bleeding per rectum, cough, SOB, recent loss of consciousness, recent mental status changes, seizures, dizziness, or upper or lower extremity weakness.    Past Medical History    Past Medical History:   Diagnosis Date    Cataract     Diarrhea 2015    c-diff    Lattice degeneration, both eyes     MRSA (methicillin resistant staph aureus) culture positive     Stickler's syndrome        Past Surgical History    Past Surgical History:   Procedure Laterality Date    ABDOMINAL SURGERY      APPENDECTOMY      CATARACT EXTRACTION W/  INTRAOCULAR LENS IMPLANT Left 06/27/2016    Dr Parker     CATARACT EXTRACTION W/  INTRAOCULAR LENS IMPLANT Right 07/18/2016    Dr Parker     INSERTION-INTRAOCULAR LENS (IOL) Right 7/18/2016    [facility] at [facility]    INSERTION-INTRAOCULAR LENS (IOL) Left 6/27/2016    [facility]    PHACOEMULSIFICATION-ASPIRATION-CATARACT Right 7/18/2016    [facility]    PHACOEMULSIFICATION-ASPIRATION-CATARACT Left 6/27/2016    [facility]    REPAIR-HERNIA-INGUINAL Open Right with Mesh Right 1/12/2017    [facility] at [facility] OR [address] [place] [address]       Social History    Social History     Socioeconomic History    Marital status:      Spouse name: Not on file    Number of children: Not on file    Years of education: Not on file     Highest education level: Not on file   Occupational History    Occupation: [employer]   Social Needs    Financial resource strain: Not on file    Food insecurity:     Worry: Not on file     Inability: Not on file    Transportation needs:     Medical: Not on file     Non-medical: Not on file   Tobacco Use    Smoking status: Never Smoker   Substance and Sexual Activity    Alcohol use: Yes     Comment: Occasional    Drug use: No    Sexual activity: Not on file   Lifestyle    Physical activity:     Days per week: Not on file     Minutes per session: Not on file    Stress: Not on file   Relationships    Social connections:     Talks on phone: Not on file     Gets together: Not on file     Attends Spiritism service: Not on file     Active member of club or organization: Not on file     Attends meetings of clubs or organizations: Not on file     Relationship status: Not on file   Other Topics Concern    Not on file   Social History Narrative    Exercising intermittently     From [place], living in [place] with family       Family History  Family History   Problem Relation Age of Onset    Cataracts Mother     Crohn's disease Mother     Retinal detachment Father     Cataracts Father     No Known Problems Sister     Retinal detachment Brother     No Known Problems Daughter     No Known Problems Sister     Retinal detachment Sister     Scoliosis Sister     Scoliosis Sister     No Known Problems Daughter          Medications    Current Outpatient Medications:     buPROPion (WELLBUTRIN XL) 150 MG TB24 tablet, Take 1 tablet (150 mg total) by mouth once daily., Disp: 90 tablet, Rfl: 3    Allergies  Review of patient's allergies indicates:  No Known Allergies      ?  PHYSICAL EXAM:    The patient generally appears in good health, is appropriately interactive, and is in no apparent distress.     Eyes: anicteric sclerae, moist conjunctivae; no lid-lag; PERRLA     HENT: Atraumatic; oropharynx clear with moist mucous  membranes and no mucosal ulcerations;normal hard and soft palate.  No evidence of lymphadenopathy.    Neck: Trachea midline.  No thyromegaly.    Musculoskeletal: No abnormal gait.    Skin: No lesions.    Mental: Cooperative with normal affect.  Is oriented to time, place, and person.    Neuro: Grossly intact.    Chest: Normal inspiratory effort.   No accessory muscles.  No audible wheezes.  Respirations symmetric on inspiration and expiration.    Heart: Regular rhythm.      Abdomen:  Soft, non-tender. No masses or organomegaly. Bladder is not palpable. No evidence of flank discomfort. No evidence of inguinal hernia.    Genitourinary: The penis has no evidence of plaques or induration. The urethral meatus is normal. The testes, epididymides, and cord structures are normal in size and contour bilaterally. The scrotum is normal in size and contour.    Extremities: No clubbing, cyanosis, or edema          A/P:  Ryder Mendoza is a 44 y.o. male who presents for evaluation re vasectomy.    1.I discussed with the patient risks and benefits, as well as alternatives. We discussed possible complications at length, including fever, infection, bleeding--possibly requiring reoperation,testicular injury or atrophy with loss of function, chronic pain, persistent or recurrent presence of sperm in the ejaculate, vasal recanalization, as well as the risks attendant to the anesthetic.  2.We discussed that he should stop aspirin, ibuprofen, and similar products at least one week prior to the procedure. Acetominophen is okay to use for headaches, pain, etc.  3. He should bring an athletic supporter and loose fitting sweat pants on the day of the procedure.  4. We discussed that he must continue to use contraception until two consecutive semen analyses (checked at approximately 4-6 weeks post-vasectomy) reveal azoospermia.  5. He will schedule an elective vasectomy.

## 2019-09-16 NOTE — PATIENT INSTRUCTIONS
Having a Vasectomy: Before, During, and After the Procedure  Vasectomy is an outpatient (same day) procedure. It can be done in a doctors office, clinic, or hospital. Your doctor will talk with you about preparing for surgery. He or she will also discuss the possible risks and complications with you. After the procedure, follow all your doctors advice for recovery.  Preparing for Surgery      The cut ends of the vas may be tied, closed with a clip, or sealed by heat (cauterized).    Your doctor will talk with you about getting ready for surgery. You may be asked to do the following:  · Sign a consent form. This must be done at least a few days before surgery. It gives your doctor permission to do the procedure. It also states that a vasectomy is not guaranteed to make you sterile.  · Dont take aspirin, ibuprofen, or naproxen for 1 week before surgery or 1 week after. These medications can cause bleeding after the procedure. Also, tell your doctor if you take any medications, supplements, or herbal remedies.  · Tell your doctor if youve had any prior scrotal surgery.  · Arrange for an adult family member or friend to give you a ride home after surgery.  · Shower and clean your scrotum the day of surgery. Your doctor may also ask you to shave your scrotum.  · Bring an athletic supporter (jockstrap) and a pair of loose fitting pants to the doctors office or hospital.  · Eat no more than a light snack before surgery.  During Surgery  The entire procedure usually lasts less than 30 minutes.  · Youll be asked to undress and lie on a table.  · You may be given medication to help you relax. To prevent pain during surgery, youll be given an injection of local anesthetic in your scrotum or lower groin.  · Once the area is numb, one or two small incisions are made in the scrotum.   · The vas deferens are lifted through the incision and cut. The ends of the vas are then sealed off using one of several methods.  · If  needed, the incision is closed with stitches.  · You can rest for a while until youre ready to go home.  Recovering at Home  For about a week, your scrotum may look bruised and slightly swollen. You may also have a small amount of bloody discharge from the incision. This is normal.  To help make your recovery more comfortable, follow the tips below.  · Stay off your feet as much as possible for the first 2 days.   · Wear an athletic supporter or snug cotton briefs for support.  · Ice the area for 24 hours  · Take medications with acetaminophen (such as Tylenol) to relieve any discomfort. Dont use aspirin, ibuprofen, or naproxen.  · Avoid heavy lifting, exercise, or intercourse for 7 days.  · Remember: You must use another form of birth control until youre completely sterile.  Call your doctor if you notice any of the following after surgery:   · Increasing pain or swelling in your scrotum  · A large black-and-blue area, or a growing lump  · Fever or chills  · Increasing redness or drainage of the incision  · Trouble urinating    Sex After Vasectomy  Vasectomy doesnt change your sexual function. So when you start having sex again, it should feel the same as before. A vasectomy also shouldnt affect your relationship with your partner. Its important to remember, though, that you wont become sterile right away. It will take time before you can have sex without the need for birth control.  · Until youre sterile: After a vasectomy, some active sperm still remain in your semen. It will take time and many ejaculations before the sperm are completely gone. During this period, you must use another birth control method to prevent pregnancy. To make sure no sperm are left in your semen, youll need to have one or more semen exams. You usually collect a semen sample at home and bring it to a lab. The sample is then checked under a microscope. Youre sterile only when these samples show no evidence of sperm. Ask your  doctor whether additional follow-up is needed.  · After youre sterile: After your doctor tells you youre sterile, you no longer need to use any form of birth control. Youre free to have sex without the fear of unwanted pregnancy. However, a vasectomy does not protect you from sexually transmitted diseases (STDs). If you have more than one sex partner, be sure to practice safer sex by using condoms.  © 5201-2425 [employer] [facility], [address] [place] [address], [place] [address]. All rights reserved. This information is not intended as a substitute for professional medical care. Always follow your healthcare professional's instructions.    Vasectomy: Risks and Complications  A vasectomy is an outpatient procedure. This means youll go home the same day. Its done in a doctors office, clinic, or hospital. Before your procedure, youll be asked to read and sign a consent form. This form states youre aware of the possible risks and complications. It also says that you understand that the procedure, though most often successful, cant promise to make you sterile. Be sure you have all your questions answered before you sign this form. Below is a list of risks and possible complications of the procedure.  Risks and Possible Complications of Vasectomy   Vasectomy is safe. But it does have risks. They include the following:  · Bleeding or infection.  · Sperm granuloma. This is a small, harmless lump. It may form where the vas deferens is sealed off.  · Loss of Testicle  · Epididymitis.This is inflammation that may cause scrotal aching. It often goes away without treatment. Anti-inflammatory medications can provide relief.  · Reconnection of the vas deferens. This can occur in rare cases. It makes you fertile again. This can result in an unwanted pregnancy.  · Sperm antibodies.These are a common response of the body to absorbed sperm. The antibodies can make you sterile. This is true even if you later try to reverse your  vasectomy.  · Long-term testicular discomfort.This may occur after surgery. But its very rare.    © 0948-5679 [facility], [address] [place] [address], [place] [address]. All rights reserved. This information is not intended as a substitute for professional medical care. Always follow your healthcare professional's instructions.

## 2019-11-04 ENCOUNTER — TELEPHONE (OUTPATIENT)
Dept: UROLOGY | Facility: CLINIC | Age: 44
End: 2019-11-04

## 2019-11-06 NOTE — TELEPHONE ENCOUNTER
Returned pts call. Detailed message left on voicemail will send pt dates available for vasectomy per [facility]. He can call me back with date or let me know by [facility].

## 2019-11-07 NOTE — TELEPHONE ENCOUNTER
Spoke to pts wife. Informed attempted to contact pt x3 times, and left messages on his voicemail each time. Pt stated he did not receive any missed calls or had any messages. Vasectomy nalini will remain on 11/15/19 as scheduled. pts wife instructed [facility] staff would call to confirm appt tomorrow. Would call the day before the procedure to confirm arrival time. Confirmed pts contact information was correct. Understanding confirmed.

## 2019-11-08 ENCOUNTER — IMMUNIZATION (OUTPATIENT)
Dept: PHARMACY | Facility: CLINIC | Age: 44
End: 2019-11-08
Payer: COMMERCIAL

## 2019-11-11 ENCOUNTER — PATIENT MESSAGE (OUTPATIENT)
Dept: UROLOGY | Facility: CLINIC | Age: 44
End: 2019-11-11

## 2019-11-14 ENCOUNTER — TELEPHONE (OUTPATIENT)
Dept: UROLOGY | Facility: HOSPITAL | Age: 44
End: 2019-11-14

## 2019-11-15 ENCOUNTER — HOSPITAL ENCOUNTER (OUTPATIENT)
Dept: UROLOGY | Facility: HOSPITAL | Age: 44
Discharge: HOME | End: 2019-11-15
Attending: UROLOGY
Payer: COMMERCIAL

## 2019-11-15 VITALS
SYSTOLIC BLOOD PRESSURE: 118 MMHG | WEIGHT: 237.19 LBS | BODY MASS INDEX: 30.44 KG/M2 | HEART RATE: 67 BPM | RESPIRATION RATE: 17 BRPM | HEIGHT: 74 IN | DIASTOLIC BLOOD PRESSURE: 71 MMHG | TEMPERATURE: 98 F

## 2019-11-15 DIAGNOSIS — Z30.2 ENCOUNTER FOR MALE STERILIZATION PROCEDURE: ICD-10-CM

## 2019-11-15 PROCEDURE — 27200994 HC ELECTROCAUTERY DEVICE

## 2019-11-15 PROCEDURE — 55250 REMOVAL OF SPERM DUCT(S): CPT

## 2019-11-15 PROCEDURE — 55250 REMOVAL OF SPERM DUCT(S): CPT | Mod: ,,, | Performed by: UROLOGY

## 2019-11-15 PROCEDURE — 55250 PR REMOVAL OF SPERM DUCT(S): ICD-10-PCS | Mod: ,,, | Performed by: UROLOGY

## 2019-11-15 RX ORDER — CEPHALEXIN 500 MG/1
500 CAPSULE ORAL 4 TIMES DAILY
Qty: 8 CAPSULE | Refills: 0 | Status: SHIPPED | OUTPATIENT
Start: 2019-11-15 | End: 2019-11-17

## 2019-11-15 RX ORDER — LIDOCAINE HYDROCHLORIDE 10 MG/ML
20 INJECTION INFILTRATION; PERINEURAL ONCE
Status: COMPLETED | OUTPATIENT
Start: 2019-11-15 | End: 2019-11-15

## 2019-11-15 RX ORDER — OXYCODONE AND ACETAMINOPHEN 5; 325 MG/1; MG/1
1 TABLET ORAL EVERY 4 HOURS PRN
Qty: 8 TABLET | Refills: 0 | Status: SHIPPED | OUTPATIENT
Start: 2019-11-15 | End: 2019-11-25

## 2019-11-15 RX ADMIN — LIDOCAINE HYDROCHLORIDE 20 ML: 10 INJECTION INFILTRATION; PERINEURAL at 07:11

## 2019-11-15 NOTE — PATIENT INSTRUCTIONS
What to Expect After a Vasectomy  You cannot drive or operate heavy machinery on the day of the procedure. Begin prescription antibiotics today and take as directed until finished. Dr. Price will give you a prescription for a narcotic pain medication. You may choose to take the prescription medication or Tylenol only for minor discomfort. Do not take any NSAIDS (e.g., Motrin, Naprosyn, etc.) or aspirin for at least one week, as these products can thin the blood.    Apply ice packs to the scrotal area for 24-48 hours. Avoid direct contact of the ice pack with the skin. Scrotal supports, jock straps, or fitted underwear help elevate the scrotum and reduce discomfort.    You may shower the next day. Gently apply soapy water to the scrotum to wash. Rinse and dry yourself by blotting the skin, not rubbing.    Avoid strenuous physical exercises or sexual relations for at least one week after a vasectomy.    Continue to use birth control for at least 6-8 weeks or 20 ejaculations. You are still considered fertile until your urologist examines a post-vasectomy semen analysis after 20 ejaculations and a second one a few days after the first. Drop off the specimens at the 4th floor [facility] department between 8am and 4pm.    Do NOT resume unprotected sexual activity until your physician finds no sperm in your semen.    All stitches will dissolve on their own in 1-2 weeks.    Signs and Symptoms to Report  · A large amount of bleeding at the site.  · An unusual amount of pain.  · A large amount of swelling in the scrotum.  · Fever and chills.  · Any signs of infection, such as redness at the site or foul-smelling discharge    Risks  The risks of complication after vasectomy are very low.    A few of the risks include:  · Bleeding.  · Infection.  · Scrotal hematoma - a collection of blood in the scrotum.  · Inflammation of the epididymis - inflammation of a structure next to the testicle that helps in maturation of the  sperm.  · Sperm granuloma - a collection of sperm that leaks out from the vas deferens, forming a small nodule or lump. This does not usually cause any discomfort, but you may feel it in the scrotum.  · Recanalization - the restoration of the lumen or transport tube between the two ends of the vas deferens, possibly causing fertility.    If you have any questions or concerns, please call Dr. Price at [phone] during regular office hours. If there are any problems after hours or on weekends, you may call [phone] and ask for the urology resident on call.

## 2019-11-15 NOTE — H&P
Chief Complaint:   Undesired fertility    HPI:  Mr. Mendoza is a 44 y.o.  male who presents for evaluation re vasectomy. He has 2 children, ages 13 and 8 yo, and he is certain that he desires no more. He is aware of other forms of contraception.  He's currently using the pill for contraception. He is aware that vasectomy is to be considered permanent/irreversible.    He denies orchalgia.  No dysuria.  No hematuria.    ROS:  Ryder Mendoza denies headache, blurred vision, fever, nausea, vomiting, chills, flank discomfort, abdominal pain, bleeding per rectum, cough, SOB, recent loss of consciousness, recent mental status changes, seizures, dizziness, or upper or lower extremity weakness.    Past Medical History    Past Medical History:   Diagnosis Date    Cataract     Diarrhea 2015    c-diff    Lattice degeneration, both eyes     MRSA (methicillin resistant staph aureus) culture positive     Stickler's syndrome        Past Surgical History    Past Surgical History:   Procedure Laterality Date    ABDOMINAL SURGERY      APPENDECTOMY      CATARACT EXTRACTION W/  INTRAOCULAR LENS IMPLANT Left 06/27/2016    Dr Parker     CATARACT EXTRACTION W/  INTRAOCULAR LENS IMPLANT Right 07/18/2016    Dr Parker        Social History    Social History     Socioeconomic History    Marital status:      Spouse name: Not on file    Number of children: Not on file    Years of education: Not on file    Highest education level: Not on file   Occupational History    Occupation:    Social Needs    Financial resource strain: Not on file    Food insecurity:     Worry: Not on file     Inability: Not on file    Transportation needs:     Medical: Not on file     Non-medical: Not on file   Tobacco Use    Smoking status: Never Smoker   Substance and Sexual Activity    Alcohol use: Yes     Comment: Occasional    Drug use: No    Sexual activity: Not on file   Lifestyle    Physical activity:     Days per week: Not on file      Minutes per session: Not on file    Stress: Not on file   Relationships    Social connections:     Talks on phone: Not on file     Gets together: Not on file     Attends Sabianism service: Not on file     Active member of club or organization: Not on file     Attends meetings of clubs or organizations: Not on file     Relationship status: Not on file   Other Topics Concern    Not on file   Social History Narrative    Exercising intermittently     From [place], living in [place] with family       Family History  Family History   Problem Relation Age of Onset    Cataracts Mother     Crohn's disease Mother     Retinal detachment Father     Cataracts Father     No Known Problems Sister     Retinal detachment Brother     No Known Problems Daughter     No Known Problems Sister     Retinal detachment Sister     Scoliosis Sister     Scoliosis Sister     No Known Problems Daughter          Medications    Current Outpatient Medications:     buPROPion (WELLBUTRIN XL) 150 MG TB24 tablet, Take 1 tablet (150 mg total) by mouth once daily., Disp: 90 tablet, Rfl: 3    Allergies  Review of patient's allergies indicates:  No Known Allergies      ?  PHYSICAL EXAM:    The patient generally appears in good health, is appropriately interactive, and is in no apparent distress.     Eyes: anicteric sclerae, moist conjunctivae; no lid-lag; PERRLA     HENT: Atraumatic; oropharynx clear with moist mucous membranes and no mucosal ulcerations;normal hard and soft palate.  No evidence of lymphadenopathy.    Neck: Trachea midline.  No thyromegaly.    Musculoskeletal: No abnormal gait.    Skin: No lesions.    Mental: Cooperative with normal affect.  Is oriented to time, place, and person.    Neuro: Grossly intact.    Chest: Normal inspiratory effort.   No accessory muscles.  No audible wheezes.  Respirations symmetric on inspiration and expiration.    Heart: Regular rhythm.      Abdomen:  Soft, non-tender. No masses or organomegaly.  Bladder is not palpable. No evidence of flank discomfort. No evidence of inguinal hernia.    Genitourinary: The penis has no evidence of plaques or induration. The urethral meatus is normal. The testes, epididymides, and cord structures are normal in size and contour bilaterally. The scrotum is normal in size and contour.    Extremities: No clubbing, cyanosis, or edema          A/P:  Ryder Mendoza is a 44 y.o. male who presents for evaluation re vasectomy.    1.I discussed with the patient risks and benefits, as well as alternatives. We discussed possible complications at length, including fever, infection, bleeding--possibly requiring reoperation,testicular injury or atrophy with loss of function, chronic pain, persistent or recurrent presence of sperm in the ejaculate, vasal recanalization, as well as the risks attendant to the anesthetic.  2.We discussed that he should stop aspirin, ibuprofen, and similar products at least one week prior to the procedure. Acetominophen is okay to use for headaches, pain, etc.  3. He should bring an athletic supporter and loose fitting sweat pants on the day of the procedure.  4. We discussed that he must continue to use contraception until two consecutive semen analyses (checked at approximately 4-6 weeks post-vasectomy) reveal azoospermia.  5. He will schedule an elective vasectomy.

## 2019-11-15 NOTE — PROCEDURES
Date: 11/15/2019     Surgeon: Dee    Assistant: None    Procedure performed: Bilateral vasectomy    Blood loss: Min.    Specimen: None    Procedure in detail:  After informed consent was obtained, the patient was placed in the supine position.  The skin was sterilized with a prep.  Attention was then turned to the patient's left hemiscrotum.    The vas was isolated on this side.  Local anesthesia was applied with 1% lidocaine.  The skin overlying the vas was incised sharply.  The vas was then isolated and grasped with a ring forceps.  It was brought through the incision.  The adventia overlying the vas was incised sharply.  The vas was then doubly clamped with a hemostat.  The vas was divided between the two hemostats with a 15 blade scalpel.    The ends of the vas were cauterized and tied with 2-0 silk sutures.  Two sutures were placed on each side. Electrocautery was used to obtain hemostasis.  A fascial interposition was then done with a 3-0 chromic.    The wound was then closed with a 3-0 chromic.    Attention was then turned to the right hemiscrotum and the exact same procedure was done. The vas was isolated on this side.  Local anesthesia was applied with 1% lidocaine.  The skin overlying the vas was incised sharply.  The vas was then isolated and grasped with a ring forceps.  It was brought through the incision.  The adventia overlying the vas was incised sharply.  The vas was then doubly clamped with a hemostat.  The vas was divided between the two hemostats with a 15 blade scalpel.    The ends of the vas were cauterized and tied with 2-0 silk sutures.  Two sutures were placed on each side. Electrocautery was used to obtain hemostasis.  A fascial interposition was then done with a 3-0 chromic.    The wound was then closed with a 3-0 chromic.    He tolerated the procedure well without complication.  He was advised to continue contraception until he brings in 2 semen samples that show no sperm.  He is also  to avoid lifting, strenuous exercise, intercourse, NSAIDS, and ASA for 1 week.  He will be discharged home is stable condition.  He is to follow up prn.

## 2019-11-27 ENCOUNTER — PATIENT MESSAGE (OUTPATIENT)
Dept: UROLOGY | Facility: CLINIC | Age: 44
End: 2019-11-27

## 2020-01-02 ENCOUNTER — OFFICE VISIT (OUTPATIENT)
Dept: URGENT CARE | Facility: CLINIC | Age: 45
End: 2020-01-02
Payer: COMMERCIAL

## 2020-01-02 VITALS
TEMPERATURE: 99 F | RESPIRATION RATE: 18 BRPM | OXYGEN SATURATION: 97 % | SYSTOLIC BLOOD PRESSURE: 117 MMHG | DIASTOLIC BLOOD PRESSURE: 77 MMHG | WEIGHT: 237 LBS | BODY MASS INDEX: 30.42 KG/M2 | HEART RATE: 84 BPM | HEIGHT: 74 IN

## 2020-01-02 DIAGNOSIS — J10.1 INFLUENZA A: Primary | ICD-10-CM

## 2020-01-02 DIAGNOSIS — J02.9 SORE THROAT: ICD-10-CM

## 2020-01-02 LAB
CTP QC/QA: YES
FLUAV AG NPH QL: POSITIVE
FLUBV AG NPH QL: NEGATIVE

## 2020-01-02 PROCEDURE — 99213 PR OFFICE/OUTPT VISIT, EST, LEVL III, 20-29 MIN: ICD-10-PCS | Mod: S$GLB,,, | Performed by: FAMILY MEDICINE

## 2020-01-02 PROCEDURE — 99213 OFFICE O/P EST LOW 20 MIN: CPT | Mod: S$GLB,,, | Performed by: FAMILY MEDICINE

## 2020-01-02 PROCEDURE — 87804 INFLUENZA ASSAY W/OPTIC: CPT | Mod: QW,S$GLB,, | Performed by: FAMILY MEDICINE

## 2020-01-02 PROCEDURE — 87804 POCT INFLUENZA A/B: ICD-10-PCS | Mod: QW,S$GLB,, | Performed by: FAMILY MEDICINE

## 2020-01-02 RX ORDER — OSELTAMIVIR PHOSPHATE 75 MG/1
75 CAPSULE ORAL 2 TIMES DAILY
Qty: 10 CAPSULE | Refills: 0 | Status: SHIPPED | OUTPATIENT
Start: 2020-01-02 | End: 2020-01-07

## 2020-01-02 NOTE — PROGRESS NOTES
"Subjective:       Patient ID: yRder Mendoza is a 45 y.o. male.    Vitals:  height is 6' 2" (1.88 m) and weight is 107.5 kg (237 lb). His oral temperature is 98.6 °F (37 °C). His blood pressure is 117/77 and his pulse is 84. His respiration is 18 and oxygen saturation is 97%.     Chief Complaint: Sinus Problem    Patient presents with c. o having cough sore throat chills and body ache that started yesterday morning.. Patient has fever of 102F . Patient took tylneol and advil but only helped for the body aches. Patient did get his flu shot this season.     Sinus Problem   This is a new problem. The current episode started yesterday. The problem is unchanged. The maximum temperature recorded prior to his arrival was 100.4 - 100.9 F. He is experiencing no pain. Associated symptoms include chills, congestion, coughing, headaches and a sore throat. Pertinent negatives include no diaphoresis, ear pain, shortness of breath or sinus pressure. Past treatments include acetaminophen. The treatment provided no relief.       Constitution: Positive for chills, fatigue and fever. Negative for sweating.   HENT: Positive for congestion and sore throat. Negative for ear pain, sinus pain, sinus pressure and voice change.    Neck: Negative for painful lymph nodes.   Eyes: Negative for eye redness.   Respiratory: Positive for cough and sputum production. Negative for chest tightness, bloody sputum, COPD, shortness of breath, stridor, wheezing and asthma.    Gastrointestinal: Negative for nausea and vomiting.   Musculoskeletal: Positive for muscle ache.   Skin: Negative for rash.   Allergic/Immunologic: Negative for seasonal allergies and asthma.   Neurological: Positive for headaches.   Hematologic/Lymphatic: Negative for swollen lymph nodes.       Objective:      Physical Exam   Constitutional: He is oriented to person, place, and time. He appears well-developed and well-nourished. He is cooperative.  Non-toxic appearance. He does " not have a sickly appearance. He does not appear ill. No distress.   HENT:   Head: Normocephalic and atraumatic.   Right Ear: Hearing, tympanic membrane, external ear and ear canal normal.   Left Ear: Hearing, tympanic membrane, external ear and ear canal normal.   Nose: No mucosal edema, rhinorrhea or nasal deformity. No epistaxis. Right sinus exhibits no maxillary sinus tenderness and no frontal sinus tenderness. Left sinus exhibits no maxillary sinus tenderness and no frontal sinus tenderness.   Mouth/Throat: Uvula is midline and mucous membranes are normal. No trismus in the jaw. Normal dentition. No uvula swelling. No oropharyngeal exudate, posterior oropharyngeal edema or posterior oropharyngeal erythema.   Clear rhinorrhea  And mild red throat   Eyes: Conjunctivae and lids are normal. No scleral icterus.   Neck: Trachea normal, full passive range of motion without pain and phonation normal. Neck supple. No neck rigidity. No edema and no erythema present.   Cardiovascular: Normal rate, regular rhythm, normal heart sounds, intact distal pulses and normal pulses.   Pulmonary/Chest: Effort normal and breath sounds normal. No respiratory distress. He has no decreased breath sounds. He has no rhonchi.   Abdominal: Normal appearance.   Musculoskeletal: Normal range of motion. He exhibits no edema or deformity.   Neurological: He is alert and oriented to person, place, and time. He exhibits normal muscle tone. Coordination normal.   Skin: Skin is warm, dry, intact, not diaphoretic and not pale.   Psychiatric: He has a normal mood and affect. His speech is normal and behavior is normal. Judgment and thought content normal. Cognition and memory are normal.   Nursing note and vitals reviewed.        Assessment:       1. Influenza A    2. Sore throat        Plan:         Influenza A  -     oseltamivir (TAMIFLU) 75 MG capsule; Take 1 capsule (75 mg total) by mouth 2 (two) times daily. for 10 doses  Dispense: 10 capsule;  Refill: 0    Sore throat  -     POCT Influenza A/B      Patient Instructions     Influenza (Adult)    Influenza is also called the flu. It is a viral illness that affects the air passages of your lungs. It is different from the common cold. The flu can easily be passed from one to person to another. It may be spread through the air by coughing and sneezing. Or it can be spread by touching the sick person and then touching your own eyes, nose, or mouth.  The flu starts 1 to 3 days after you are exposed to the flu virus. It may last for 1 to 2 weeks but many people feel tired or fatigued for many weeks afterward. You usually dont need to take antibiotics unless you have a complication. This might be an ear or sinus infection or pneumonia.  Symptoms of the flu may be mild or severe. They can include extreme tiredness (wanting to stay in bed all day), chills, fevers, muscle aches, soreness with eye movement, headache, and a dry, hacking cough.  Home care  Follow these guidelines when caring for yourself at home:  · Avoid being around cigarette smoke, whether yours or other peoples.  · Acetaminophen or ibuprofen will help ease your fever, muscle aches, and headache. Dont give aspirin to anyone younger than 18 who has the flu. Aspirin can harm the liver.  · Nausea and loss of appetite are common with the flu. Eat light meals. Drink 6 to 8 glasses of liquids every day. Good choices are water, sport drinks, soft drinks without caffeine, juices, tea, and soup. Extra fluids will also help loosen secretions in your nose and lungs.  · Over-the-counter cold medicines will not make the flu go away faster. But the medicines may help with coughing, sore throat, and congestion in your nose and sinuses. Dont use a decongestant if you have high blood pressure.  · Stay home until your fever has been gone for at least 24 hours without using medicine to reduce fever.  Follow-up care  Follow up with your healthcare provider, or as  advised, if you are not getting better over the next week.  If you are age 65 or older, talk with your provider about getting a pneumococcal vaccine every 5 years. You should also get this vaccine if you have chronic asthma or COPD. All adults should get a flu vaccine every fall. Ask your provider about this.  When to seek medical advice  Call your healthcare provider right away if any of these occur:  · Cough with lots of colored mucus (sputum) or blood in your mucus  · Chest pain, shortness of breath, wheezing, or trouble breathing  · Severe headache, or face, neck, or ear pain  · New rash with fever  · Fever of 100.4°F (38°C) or higher, or as directed by your healthcare provider  · Confusion, behavior change, or seizure  · Severe weakness or dizziness  · You get a new fever or cough after getting better for a few days  Date Last Reviewed: 1/1/2017  © 9792-2554 [employer]. [address] [place] [address], [place] [address]. All rights reserved. This information is not intended as a substitute for professional medical care. Always follow your healthcare professional's instructions.

## 2020-01-02 NOTE — PATIENT INSTRUCTIONS

## 2020-01-06 ENCOUNTER — PATIENT MESSAGE (OUTPATIENT)
Dept: UROLOGY | Facility: CLINIC | Age: 45
End: 2020-01-06

## 2020-01-07 ENCOUNTER — TELEPHONE (OUTPATIENT)
Dept: UROLOGY | Facility: CLINIC | Age: 45
End: 2020-01-07

## 2020-01-07 NOTE — TELEPHONE ENCOUNTER
Discussed and reviewed no sperm seen under microscope on semen specimen submitted by pt today.  Advised to submit another specimen in one wk (7 days).  Recommend multiple ejaculations, and continue use of barrier methods w/ intercourse.  Pt verbalized understanding.

## 2020-01-15 ENCOUNTER — TELEPHONE (OUTPATIENT)
Dept: UROLOGY | Facility: CLINIC | Age: 45
End: 2020-01-15

## 2020-01-15 ENCOUNTER — PATIENT MESSAGE (OUTPATIENT)
Dept: UROLOGY | Facility: CLINIC | Age: 45
End: 2020-01-15

## 2020-01-15 NOTE — TELEPHONE ENCOUNTER
Patient identified himself on his voicemail.  Therefore a detail message was left stating the following:  His post vas specimen showed 1 dead sperm.  He was instructed to wait at least 1 week before submitting another sample.  He was instructed to have multiple ejaculations in the meantime and continue to use some form of birth control until he is cleared.

## 2020-10-05 ENCOUNTER — PATIENT MESSAGE (OUTPATIENT)
Dept: ADMINISTRATIVE | Facility: HOSPITAL | Age: 45
End: 2020-10-05

## 2020-10-19 ENCOUNTER — PATIENT MESSAGE (OUTPATIENT)
Dept: INTERNAL MEDICINE | Facility: CLINIC | Age: 45
End: 2020-10-19

## 2020-11-05 ENCOUNTER — IMMUNIZATION (OUTPATIENT)
Dept: PHARMACY | Facility: CLINIC | Age: 45
End: 2020-11-05
Payer: COMMERCIAL

## 2020-11-17 ENCOUNTER — PATIENT MESSAGE (OUTPATIENT)
Dept: INTERNAL MEDICINE | Facility: CLINIC | Age: 45
End: 2020-11-17

## 2020-11-18 ENCOUNTER — OFFICE VISIT (OUTPATIENT)
Dept: INTERNAL MEDICINE | Facility: CLINIC | Age: 45
End: 2020-11-18
Attending: INTERNAL MEDICINE
Payer: COMMERCIAL

## 2020-11-18 VITALS
OXYGEN SATURATION: 97 % | HEART RATE: 81 BPM | SYSTOLIC BLOOD PRESSURE: 118 MMHG | HEIGHT: 74 IN | BODY MASS INDEX: 30.5 KG/M2 | WEIGHT: 237.63 LBS | DIASTOLIC BLOOD PRESSURE: 84 MMHG

## 2020-11-18 DIAGNOSIS — F32.A DEPRESSION, UNSPECIFIED DEPRESSION TYPE: ICD-10-CM

## 2020-11-18 DIAGNOSIS — E78.49 OTHER HYPERLIPIDEMIA: ICD-10-CM

## 2020-11-18 DIAGNOSIS — Z00.00 ANNUAL PHYSICAL EXAM: Primary | ICD-10-CM

## 2020-11-18 PROCEDURE — 99396 PR PREVENTIVE VISIT,EST,40-64: ICD-10-PCS | Mod: S$GLB,,, | Performed by: INTERNAL MEDICINE

## 2020-11-18 PROCEDURE — 1126F AMNT PAIN NOTED NONE PRSNT: CPT | Mod: S$GLB,,, | Performed by: INTERNAL MEDICINE

## 2020-11-18 PROCEDURE — 99999 PR PBB SHADOW E&M-EST. PATIENT-LVL III: ICD-10-PCS | Mod: PBBFAC,,, | Performed by: INTERNAL MEDICINE

## 2020-11-18 PROCEDURE — 1126F PR PAIN SEVERITY QUANTIFIED, NO PAIN PRESENT: ICD-10-PCS | Mod: S$GLB,,, | Performed by: INTERNAL MEDICINE

## 2020-11-18 PROCEDURE — 99999 PR PBB SHADOW E&M-EST. PATIENT-LVL III: CPT | Mod: PBBFAC,,, | Performed by: INTERNAL MEDICINE

## 2020-11-18 PROCEDURE — 3008F PR BODY MASS INDEX (BMI) DOCUMENTED: ICD-10-PCS | Mod: CPTII,S$GLB,, | Performed by: INTERNAL MEDICINE

## 2020-11-18 PROCEDURE — 99396 PREV VISIT EST AGE 40-64: CPT | Mod: S$GLB,,, | Performed by: INTERNAL MEDICINE

## 2020-11-18 PROCEDURE — 3008F BODY MASS INDEX DOCD: CPT | Mod: CPTII,S$GLB,, | Performed by: INTERNAL MEDICINE

## 2020-11-18 RX ORDER — BUPROPION HYDROCHLORIDE 150 MG/1
150 TABLET ORAL DAILY
Qty: 90 TABLET | Refills: 3 | Status: SHIPPED | OUTPATIENT
Start: 2020-11-18 | End: 2021-11-29 | Stop reason: SDUPTHER

## 2020-11-18 NOTE — PROGRESS NOTES
"Subjective:   Patient ID: Ryder Mendoza is a 45 y.o. male  Chief complaint:   No chief complaint on file.      HPI    Here for annual exam  Now working at [employer]     Exercising - tennis 1-2 times per week   Biking     Depression and anxiety: well controlled on current dose of wellbutrin  No si/hi/clarke    Flu vaccine:  utd    HLD: due for repeat labs     Review of Systems      Objective:  Vitals:    11/18/20 0832   BP: 118/84   BP Location: Left arm   Patient Position: Sitting   Pulse: 81   SpO2: 97%   Weight: 107.8 kg (237 lb 10.5 oz)   Height: 6' 2" (1.88 m)     Body mass index is 30.51 kg/m².    Physical Exam  Vitals signs reviewed.   Constitutional:       Appearance: Normal appearance. He is well-developed.   HENT:      Head: Normocephalic and atraumatic.      Right Ear: Tympanic membrane, ear canal and external ear normal.      Left Ear: Tympanic membrane, ear canal and external ear normal.      Nose:      Comments: Wearing mask  Eyes:      Extraocular Movements: Extraocular movements intact.      Conjunctiva/sclera: Conjunctivae normal.   Neck:      Musculoskeletal: Neck supple.      Thyroid: No thyromegaly.   Cardiovascular:      Rate and Rhythm: Normal rate and regular rhythm.      Pulses: Normal pulses.      Heart sounds: Normal heart sounds.   Pulmonary:      Effort: Pulmonary effort is normal.      Breath sounds: Normal breath sounds.   Abdominal:      General: Bowel sounds are normal.      Palpations: Abdomen is soft.   Musculoskeletal:         General: No swelling or tenderness.   Lymphadenopathy:      Cervical: No cervical adenopathy.   Skin:     General: Skin is warm and dry.      Capillary Refill: Capillary refill takes less than 2 seconds.   Neurological:      General: No focal deficit present.      Mental Status: He is alert and oriented to person, place, and time.   Psychiatric:         Mood and Affect: Mood normal.         Behavior: Behavior normal.         Thought " Content: Thought content normal.         Judgment: Judgment normal.       Assessment:  1. Annual physical exam    2. Depression, unspecified depression type    3. Other hyperlipidemia        Plan:  Ryder was seen today for annual exam.    Diagnoses and all orders for this visit:    Recommend daily sunscreen, cardiovascular exercise min 30 min 5 days per week. Seatbelts routinely.    Check approp labs     Cont current medication - Refills given     Mediterranean diet    Health Maintenance   Topic Date Due    Lipid Panel  11/19/2025    TETANUS VACCINE  12/02/2026    Hepatitis C Screening  Completed

## 2020-11-19 ENCOUNTER — LAB VISIT (OUTPATIENT)
Dept: LAB | Facility: OTHER | Age: 45
End: 2020-11-19
Attending: INTERNAL MEDICINE
Payer: COMMERCIAL

## 2020-11-19 DIAGNOSIS — Z00.00 ANNUAL PHYSICAL EXAM: ICD-10-CM

## 2020-11-19 LAB
ALBUMIN SERPL BCP-MCNC: 4.2 G/DL (ref 3.5–5.2)
ALP SERPL-CCNC: 48 U/L (ref 55–135)
ALT SERPL W/O P-5'-P-CCNC: 23 U/L (ref 10–44)
ANION GAP SERPL CALC-SCNC: 6 MMOL/L (ref 8–16)
AST SERPL-CCNC: 22 U/L (ref 10–40)
BILIRUB SERPL-MCNC: 0.7 MG/DL (ref 0.1–1)
BUN SERPL-MCNC: 16 MG/DL (ref 6–20)
CALCIUM SERPL-MCNC: 9.3 MG/DL (ref 8.7–10.5)
CHLORIDE SERPL-SCNC: 104 MMOL/L (ref 95–110)
CHOLEST SERPL-MCNC: 224 MG/DL (ref 120–199)
CHOLEST/HDLC SERPL: 4 {RATIO} (ref 2–5)
CO2 SERPL-SCNC: 28 MMOL/L (ref 23–29)
CREAT SERPL-MCNC: 1 MG/DL (ref 0.5–1.4)
EST. GFR  (AFRICAN AMERICAN): >60 ML/MIN/1.73 M^2
EST. GFR  (NON AFRICAN AMERICAN): >60 ML/MIN/1.73 M^2
GLUCOSE SERPL-MCNC: 90 MG/DL (ref 70–110)
HDLC SERPL-MCNC: 56 MG/DL (ref 40–75)
HDLC SERPL: 25 % (ref 20–50)
LDLC SERPL CALC-MCNC: 150.6 MG/DL (ref 63–159)
NONHDLC SERPL-MCNC: 168 MG/DL
POTASSIUM SERPL-SCNC: 4.3 MMOL/L (ref 3.5–5.1)
PROT SERPL-MCNC: 7.2 G/DL (ref 6–8.4)
SODIUM SERPL-SCNC: 138 MMOL/L (ref 136–145)
TRIGL SERPL-MCNC: 87 MG/DL (ref 30–150)

## 2020-11-19 PROCEDURE — 86703 HIV-1/HIV-2 1 RESULT ANTBDY: CPT

## 2020-11-19 PROCEDURE — 80053 COMPREHEN METABOLIC PANEL: CPT

## 2020-11-19 PROCEDURE — 86803 HEPATITIS C AB TEST: CPT

## 2020-11-19 PROCEDURE — 36415 COLL VENOUS BLD VENIPUNCTURE: CPT

## 2020-11-19 PROCEDURE — 80061 LIPID PANEL: CPT

## 2020-11-20 LAB
HCV AB SERPL QL IA: NEGATIVE
HIV 1+2 AB+HIV1 P24 AG SERPL QL IA: NEGATIVE

## 2020-11-22 PROBLEM — E78.49 OTHER HYPERLIPIDEMIA: Status: ACTIVE | Noted: 2020-11-22

## 2021-02-01 DIAGNOSIS — Z71.9 HEALTH EDUCATION/COUNSELING: Primary | ICD-10-CM

## 2021-10-25 ENCOUNTER — CLINICAL SUPPORT (OUTPATIENT)
Dept: URGENT CARE | Facility: CLINIC | Age: 46
End: 2021-10-25
Payer: COMMERCIAL

## 2021-10-25 DIAGNOSIS — Z11.9 SCREENING EXAMINATION FOR UNSPECIFIED INFECTIOUS DISEASE: Primary | ICD-10-CM

## 2021-10-25 LAB
CTP QC/QA: YES
SARS-COV-2 RDRP RESP QL NAA+PROBE: NEGATIVE

## 2021-10-25 PROCEDURE — 99211 OFF/OP EST MAY X REQ PHY/QHP: CPT | Mod: S$GLB,CS,, | Performed by: FAMILY MEDICINE

## 2021-10-25 PROCEDURE — U0002: ICD-10-PCS | Mod: QW,S$GLB,, | Performed by: FAMILY MEDICINE

## 2021-10-25 PROCEDURE — 99211 PR OFFICE/OUTPT VISIT, EST, LEVL I: ICD-10-PCS | Mod: S$GLB,CS,, | Performed by: FAMILY MEDICINE

## 2021-10-25 PROCEDURE — U0002 COVID-19 LAB TEST NON-CDC: HCPCS | Mod: QW,S$GLB,, | Performed by: FAMILY MEDICINE

## 2021-11-16 ENCOUNTER — IMMUNIZATION (OUTPATIENT)
Dept: PHARMACY | Facility: CLINIC | Age: 46
End: 2021-11-16
Payer: COMMERCIAL

## 2021-11-19 ENCOUNTER — IMMUNIZATION (OUTPATIENT)
Dept: INTERNAL MEDICINE | Facility: CLINIC | Age: 46
End: 2021-11-19
Payer: COMMERCIAL

## 2021-11-19 DIAGNOSIS — Z23 NEED FOR VACCINATION: Primary | ICD-10-CM

## 2021-11-19 PROCEDURE — 0004A COVID-19, MRNA, LNP-S, PF, 30 MCG/0.3 ML DOSE VACCINE: CPT | Mod: PBBFAC | Performed by: INTERNAL MEDICINE

## 2021-11-29 DIAGNOSIS — F32.A DEPRESSION, UNSPECIFIED DEPRESSION TYPE: ICD-10-CM

## 2021-11-29 RX ORDER — BUPROPION HYDROCHLORIDE 150 MG/1
150 TABLET ORAL DAILY
Qty: 90 TABLET | Refills: 3 | Status: SHIPPED | OUTPATIENT
Start: 2021-11-29 | End: 2022-04-21 | Stop reason: SDUPTHER

## 2022-01-11 ENCOUNTER — PATIENT MESSAGE (OUTPATIENT)
Dept: INTERNAL MEDICINE | Facility: CLINIC | Age: 47
End: 2022-01-11
Payer: COMMERCIAL

## 2022-01-11 DIAGNOSIS — J02.9 SORE THROAT: Primary | ICD-10-CM

## 2022-01-11 NOTE — TELEPHONE ENCOUNTER
Order signed   Please arrange asap - thanks!   I hope he feels better soon - yes sore throat is a common symptom of the new variant

## 2022-01-12 ENCOUNTER — LAB VISIT (OUTPATIENT)
Dept: PRIMARY CARE CLINIC | Facility: CLINIC | Age: 47
End: 2022-01-12
Payer: COMMERCIAL

## 2022-01-12 DIAGNOSIS — Z20.822 CONTACT WITH AND (SUSPECTED) EXPOSURE TO COVID-19: ICD-10-CM

## 2022-01-12 LAB
CTP QC/QA: YES
SARS-COV-2 AG RESP QL IA.RAPID: NEGATIVE

## 2022-01-12 PROCEDURE — 87811 SARS-COV-2 COVID19 W/OPTIC: CPT

## 2022-02-15 ENCOUNTER — OFFICE VISIT (OUTPATIENT)
Dept: INTERNAL MEDICINE | Facility: CLINIC | Age: 47
End: 2022-02-15
Attending: INTERNAL MEDICINE
Payer: COMMERCIAL

## 2022-02-15 ENCOUNTER — PATIENT MESSAGE (OUTPATIENT)
Dept: INTERNAL MEDICINE | Facility: CLINIC | Age: 47
End: 2022-02-15

## 2022-02-15 VITALS
BODY MASS INDEX: 31.07 KG/M2 | SYSTOLIC BLOOD PRESSURE: 117 MMHG | HEART RATE: 67 BPM | WEIGHT: 242.06 LBS | DIASTOLIC BLOOD PRESSURE: 78 MMHG | HEIGHT: 74 IN | OXYGEN SATURATION: 97 %

## 2022-02-15 DIAGNOSIS — E66.9 OBESITY (BMI 30.0-34.9): ICD-10-CM

## 2022-02-15 DIAGNOSIS — Z12.11 SCREENING FOR COLON CANCER: ICD-10-CM

## 2022-02-15 DIAGNOSIS — Q89.8 STICKLER'S SYNDROME: ICD-10-CM

## 2022-02-15 DIAGNOSIS — F41.1 GAD (GENERALIZED ANXIETY DISORDER): ICD-10-CM

## 2022-02-15 DIAGNOSIS — E78.49 OTHER HYPERLIPIDEMIA: ICD-10-CM

## 2022-02-15 DIAGNOSIS — Z00.00 ANNUAL PHYSICAL EXAM: Primary | ICD-10-CM

## 2022-02-15 PROBLEM — E66.811 OBESITY (BMI 30.0-34.9): Status: ACTIVE | Noted: 2022-02-15

## 2022-02-15 PROCEDURE — 3074F SYST BP LT 130 MM HG: CPT | Mod: CPTII,S$GLB,, | Performed by: INTERNAL MEDICINE

## 2022-02-15 PROCEDURE — 1160F PR REVIEW ALL MEDS BY PRESCRIBER/CLIN PHARMACIST DOCUMENTED: ICD-10-PCS | Mod: CPTII,S$GLB,, | Performed by: INTERNAL MEDICINE

## 2022-02-15 PROCEDURE — 99396 PR PREVENTIVE VISIT,EST,40-64: ICD-10-PCS | Mod: S$GLB,,, | Performed by: INTERNAL MEDICINE

## 2022-02-15 PROCEDURE — 99999 PR PBB SHADOW E&M-EST. PATIENT-LVL III: CPT | Mod: PBBFAC,,, | Performed by: INTERNAL MEDICINE

## 2022-02-15 PROCEDURE — 99999 PR PBB SHADOW E&M-EST. PATIENT-LVL III: ICD-10-PCS | Mod: PBBFAC,,, | Performed by: INTERNAL MEDICINE

## 2022-02-15 PROCEDURE — 99396 PREV VISIT EST AGE 40-64: CPT | Mod: S$GLB,,, | Performed by: INTERNAL MEDICINE

## 2022-02-15 PROCEDURE — 3078F DIAST BP <80 MM HG: CPT | Mod: CPTII,S$GLB,, | Performed by: INTERNAL MEDICINE

## 2022-02-15 PROCEDURE — 3008F PR BODY MASS INDEX (BMI) DOCUMENTED: ICD-10-PCS | Mod: CPTII,S$GLB,, | Performed by: INTERNAL MEDICINE

## 2022-02-15 PROCEDURE — 1159F PR MEDICATION LIST DOCUMENTED IN MEDICAL RECORD: ICD-10-PCS | Mod: CPTII,S$GLB,, | Performed by: INTERNAL MEDICINE

## 2022-02-15 PROCEDURE — 1159F MED LIST DOCD IN RCRD: CPT | Mod: CPTII,S$GLB,, | Performed by: INTERNAL MEDICINE

## 2022-02-15 PROCEDURE — 3078F PR MOST RECENT DIASTOLIC BLOOD PRESSURE < 80 MM HG: ICD-10-PCS | Mod: CPTII,S$GLB,, | Performed by: INTERNAL MEDICINE

## 2022-02-15 PROCEDURE — 3074F PR MOST RECENT SYSTOLIC BLOOD PRESSURE < 130 MM HG: ICD-10-PCS | Mod: CPTII,S$GLB,, | Performed by: INTERNAL MEDICINE

## 2022-02-15 PROCEDURE — 3008F BODY MASS INDEX DOCD: CPT | Mod: CPTII,S$GLB,, | Performed by: INTERNAL MEDICINE

## 2022-02-15 PROCEDURE — 1160F RVW MEDS BY RX/DR IN RCRD: CPT | Mod: CPTII,S$GLB,, | Performed by: INTERNAL MEDICINE

## 2022-02-15 NOTE — PROGRESS NOTES
"Subjective:   Patient ID: Ryder Mendoza is a 47 y.o. male  Chief complaint:   Chief Complaint   Patient presents with    Annual Exam       HPI    Here for annual exam    Still working at [employer] - understaffed   Had covid last week and sx improving - still fatigued - no fevers     Exercising - tennis 1-2 times per week   Biking   - right arm/shoulder soreness - intermittent - an last 2 days up to a week   - improves with rest and otc meds      Depression and anxiety:   - well controlled on current dose of wellbutrin  - No si/hi/clarke    HLD:   - due for repeat labs   - no family hx of cad/cva    No intermittency or hesitancy, weak stream, incontinence, nocturia  No increased urgency, increased frequency, burning with urination, hematuria, foul smelling urine, cloudy urine, lower back pain, suprapubic pain or pressure.  No pain with defecation or testicular pain. No penile discharge   No fevers or chills    HM:  cscope    Ophthalmology: Mike  Urology: Dee  ENT: Supa  Gen surg: right ing hernia repair   Psych: Cristian    Review of Systems      Objective:  Vitals:    02/15/22 1428 02/15/22 1509   BP: 115/88 117/78   BP Location: Left arm    Patient Position: Sitting    Pulse: 67    SpO2: 97%    Weight: 109.8 kg (242 lb 1 oz)    Height: 6' 2" (1.88 m)      Body mass index is 31.08 kg/m².    Physical Exam  Vitals reviewed.   Constitutional:       Appearance: Normal appearance. He is well-developed.   HENT:      Head: Normocephalic and atraumatic.      Right Ear: Tympanic membrane, ear canal and external ear normal.      Left Ear: Tympanic membrane, ear canal and external ear normal.      Nose:      Comments: Wearing mask  Eyes:      Extraocular Movements: Extraocular movements intact.      Conjunctiva/sclera: Conjunctivae normal.   Neck:      Thyroid: No thyromegaly.   Cardiovascular:      Rate and Rhythm: Normal rate and regular rhythm.      Pulses: Normal pulses.      Heart sounds: " Normal heart sounds.   Pulmonary:      Effort: Pulmonary effort is normal.      Breath sounds: Normal breath sounds.   Abdominal:      General: Bowel sounds are normal.      Palpations: Abdomen is soft.   Musculoskeletal:         General: No swelling or tenderness.      Cervical back: Neck supple.   Lymphadenopathy:      Cervical: No cervical adenopathy.   Skin:     General: Skin is warm and dry.      Capillary Refill: Capillary refill takes less than 2 seconds.   Neurological:      General: No focal deficit present.      Mental Status: He is alert and oriented to person, place, and time.   Psychiatric:         Mood and Affect: Mood normal.         Behavior: Behavior normal.         Thought Content: Thought content normal.         Judgment: Judgment normal.       Assessment:  1. Annual physical exam    2. Screening for colon cancer    3. Stickler's syndrome    4. SANYA (generalized anxiety disorder)    5. Other hyperlipidemia    6. Obesity (BMI 30.0-34.9)        Plan:  Ryder was seen today for annual exam.    Diagnoses and all orders for this visit:    Annual physical exam  -     Cancel: Hemoglobin A1C; Future  -     TSH; Future  -     Lipid Panel; Future  -     CBC Auto Differential; Future  -     Comprehensive Metabolic Panel; Future    Screening for colon cancer  -     Case Request Endoscopy: COLONOSCOPY    Stickler's syndrome    SANYA (generalized anxiety disorder)    Other hyperlipidemia    Obesity (BMI 30.0-34.9)  - cont diet and exercise  - increase intensity and duration of CV exercise to continue weight loss  - goal wt loss one pound per week  - portion control, healthy choices    Recommend daily sunscreen, cardiovascular exercise min 30 min 5 days per week. Seatbelts routinely.  Check approp labs   F/u with eye specialist   Cont current medication   I recommend the Mediterranean diet, a graded exercise program and maintaining a healthy weight to optimize cholesterol levels.    Health Maintenance   Topic Date  Due    Lipid Panel  11/19/2025    TETANUS VACCINE  12/02/2026    Hepatitis C Screening  Completed

## 2022-04-20 ENCOUNTER — PATIENT MESSAGE (OUTPATIENT)
Dept: INTERNAL MEDICINE | Facility: CLINIC | Age: 47
End: 2022-04-20
Payer: COMMERCIAL

## 2022-04-20 DIAGNOSIS — F32.A DEPRESSION, UNSPECIFIED DEPRESSION TYPE: ICD-10-CM

## 2022-04-21 RX ORDER — BUPROPION HYDROCHLORIDE 300 MG/1
300 TABLET ORAL DAILY
Qty: 90 TABLET | Refills: 3 | Status: SHIPPED | OUTPATIENT
Start: 2022-04-21 | End: 2023-04-19 | Stop reason: SDUPTHER

## 2022-04-21 NOTE — TELEPHONE ENCOUNTER
No new care gaps identified.  Powered by [employer]. Reference number: [id number].   4/21/2022 7:33:50 AM CDT

## 2022-07-08 ENCOUNTER — PATIENT MESSAGE (OUTPATIENT)
Dept: UROLOGY | Facility: CLINIC | Age: 47
End: 2022-07-08
Payer: COMMERCIAL

## 2022-07-14 DIAGNOSIS — F32.A DEPRESSION, UNSPECIFIED DEPRESSION TYPE: ICD-10-CM

## 2022-07-14 RX ORDER — BUPROPION HYDROCHLORIDE 300 MG/1
300 TABLET ORAL DAILY
Qty: 90 TABLET | Refills: 3 | OUTPATIENT
Start: 2022-07-14

## 2022-07-14 NOTE — TELEPHONE ENCOUNTER
No new care gaps identified.  [facility] Embedded Care Gaps. Reference number: [id number]. 7/14/2022   7:31:34 AM LORETTAT

## 2022-08-12 ENCOUNTER — PATIENT MESSAGE (OUTPATIENT)
Dept: ENDOSCOPY | Facility: HOSPITAL | Age: 47
End: 2022-08-12
Payer: COMMERCIAL

## 2023-01-04 ENCOUNTER — PATIENT MESSAGE (OUTPATIENT)
Dept: INTERNAL MEDICINE | Facility: CLINIC | Age: 48
End: 2023-01-04
Payer: COMMERCIAL

## 2023-01-05 ENCOUNTER — OFFICE VISIT (OUTPATIENT)
Dept: SPORTS MEDICINE | Facility: CLINIC | Age: 48
End: 2023-01-05
Attending: INTERNAL MEDICINE
Payer: COMMERCIAL

## 2023-01-05 ENCOUNTER — APPOINTMENT (OUTPATIENT)
Dept: RADIOLOGY | Facility: OTHER | Age: 48
End: 2023-01-05
Attending: ORTHOPAEDIC SURGERY
Payer: COMMERCIAL

## 2023-01-05 ENCOUNTER — OFFICE VISIT (OUTPATIENT)
Dept: INTERNAL MEDICINE | Facility: CLINIC | Age: 48
End: 2023-01-05
Attending: INTERNAL MEDICINE
Payer: COMMERCIAL

## 2023-01-05 ENCOUNTER — PATIENT MESSAGE (OUTPATIENT)
Dept: INTERNAL MEDICINE | Facility: CLINIC | Age: 48
End: 2023-01-05

## 2023-01-05 VITALS
DIASTOLIC BLOOD PRESSURE: 90 MMHG | BODY MASS INDEX: 32.08 KG/M2 | HEIGHT: 74 IN | SYSTOLIC BLOOD PRESSURE: 122 MMHG | WEIGHT: 250 LBS

## 2023-01-05 VITALS
SYSTOLIC BLOOD PRESSURE: 122 MMHG | HEIGHT: 74 IN | DIASTOLIC BLOOD PRESSURE: 90 MMHG | HEART RATE: 76 BPM | WEIGHT: 250.25 LBS | BODY MASS INDEX: 32.12 KG/M2 | OXYGEN SATURATION: 97 %

## 2023-01-05 DIAGNOSIS — M25.512 ACUTE PAIN OF LEFT SHOULDER: Primary | ICD-10-CM

## 2023-01-05 DIAGNOSIS — M25.512 LEFT SHOULDER PAIN, UNSPECIFIED CHRONICITY: Primary | ICD-10-CM

## 2023-01-05 DIAGNOSIS — M75.32 CALCIFIC TENDINITIS OF LEFT SHOULDER: ICD-10-CM

## 2023-01-05 DIAGNOSIS — M25.512 LEFT SHOULDER PAIN, UNSPECIFIED CHRONICITY: ICD-10-CM

## 2023-01-05 DIAGNOSIS — M75.22 BICIPITAL TENDONITIS OF LEFT SHOULDER: ICD-10-CM

## 2023-01-05 PROCEDURE — 99213 OFFICE O/P EST LOW 20 MIN: CPT | Mod: S$GLB,,, | Performed by: INTERNAL MEDICINE

## 2023-01-05 PROCEDURE — 73030 X-RAY EXAM OF SHOULDER: CPT | Mod: TC,LT

## 2023-01-05 PROCEDURE — 1159F PR MEDICATION LIST DOCUMENTED IN MEDICAL RECORD: ICD-10-PCS | Mod: CPTII,S$GLB,, | Performed by: ORTHOPAEDIC SURGERY

## 2023-01-05 PROCEDURE — 1160F PR REVIEW ALL MEDS BY PRESCRIBER/CLIN PHARMACIST DOCUMENTED: ICD-10-PCS | Mod: CPTII,S$GLB,, | Performed by: ORTHOPAEDIC SURGERY

## 2023-01-05 PROCEDURE — 3074F PR MOST RECENT SYSTOLIC BLOOD PRESSURE < 130 MM HG: ICD-10-PCS | Mod: CPTII,S$GLB,, | Performed by: INTERNAL MEDICINE

## 2023-01-05 PROCEDURE — 3080F PR MOST RECENT DIASTOLIC BLOOD PRESSURE >= 90 MM HG: ICD-10-PCS | Mod: CPTII,S$GLB,, | Performed by: INTERNAL MEDICINE

## 2023-01-05 PROCEDURE — 1160F RVW MEDS BY RX/DR IN RCRD: CPT | Mod: CPTII,S$GLB,, | Performed by: INTERNAL MEDICINE

## 2023-01-05 PROCEDURE — 99999 PR PBB SHADOW E&M-EST. PATIENT-LVL III: CPT | Mod: PBBFAC,,, | Performed by: ORTHOPAEDIC SURGERY

## 2023-01-05 PROCEDURE — 99999 PR PBB SHADOW E&M-EST. PATIENT-LVL III: ICD-10-PCS | Mod: PBBFAC,,, | Performed by: ORTHOPAEDIC SURGERY

## 2023-01-05 PROCEDURE — 73030 XR SHOULDER COMPLETE 2 OR MORE VIEWS LEFT: ICD-10-PCS | Mod: 26,LT,, | Performed by: RADIOLOGY

## 2023-01-05 PROCEDURE — 1160F PR REVIEW ALL MEDS BY PRESCRIBER/CLIN PHARMACIST DOCUMENTED: ICD-10-PCS | Mod: CPTII,S$GLB,, | Performed by: INTERNAL MEDICINE

## 2023-01-05 PROCEDURE — 3074F SYST BP LT 130 MM HG: CPT | Mod: CPTII,S$GLB,, | Performed by: INTERNAL MEDICINE

## 2023-01-05 PROCEDURE — 99213 PR OFFICE/OUTPT VISIT, EST, LEVL III, 20-29 MIN: ICD-10-PCS | Mod: S$GLB,,, | Performed by: INTERNAL MEDICINE

## 2023-01-05 PROCEDURE — 3008F PR BODY MASS INDEX (BMI) DOCUMENTED: ICD-10-PCS | Mod: CPTII,S$GLB,, | Performed by: ORTHOPAEDIC SURGERY

## 2023-01-05 PROCEDURE — 3080F DIAST BP >= 90 MM HG: CPT | Mod: CPTII,S$GLB,, | Performed by: ORTHOPAEDIC SURGERY

## 2023-01-05 PROCEDURE — 1159F MED LIST DOCD IN RCRD: CPT | Mod: CPTII,S$GLB,, | Performed by: ORTHOPAEDIC SURGERY

## 2023-01-05 PROCEDURE — 99999 PR PBB SHADOW E&M-EST. PATIENT-LVL IV: CPT | Mod: PBBFAC,,, | Performed by: INTERNAL MEDICINE

## 2023-01-05 PROCEDURE — 3008F PR BODY MASS INDEX (BMI) DOCUMENTED: ICD-10-PCS | Mod: CPTII,S$GLB,, | Performed by: INTERNAL MEDICINE

## 2023-01-05 PROCEDURE — 3074F SYST BP LT 130 MM HG: CPT | Mod: CPTII,S$GLB,, | Performed by: ORTHOPAEDIC SURGERY

## 2023-01-05 PROCEDURE — 3080F PR MOST RECENT DIASTOLIC BLOOD PRESSURE >= 90 MM HG: ICD-10-PCS | Mod: CPTII,S$GLB,, | Performed by: ORTHOPAEDIC SURGERY

## 2023-01-05 PROCEDURE — 99204 PR OFFICE/OUTPT VISIT, NEW, LEVL IV, 45-59 MIN: ICD-10-PCS | Mod: S$GLB,,, | Performed by: ORTHOPAEDIC SURGERY

## 2023-01-05 PROCEDURE — 1159F PR MEDICATION LIST DOCUMENTED IN MEDICAL RECORD: ICD-10-PCS | Mod: CPTII,S$GLB,, | Performed by: INTERNAL MEDICINE

## 2023-01-05 PROCEDURE — 3074F PR MOST RECENT SYSTOLIC BLOOD PRESSURE < 130 MM HG: ICD-10-PCS | Mod: CPTII,S$GLB,, | Performed by: ORTHOPAEDIC SURGERY

## 2023-01-05 PROCEDURE — 99999 PR PBB SHADOW E&M-EST. PATIENT-LVL IV: ICD-10-PCS | Mod: PBBFAC,,, | Performed by: INTERNAL MEDICINE

## 2023-01-05 PROCEDURE — 73030 X-RAY EXAM OF SHOULDER: CPT | Mod: 26,LT,, | Performed by: RADIOLOGY

## 2023-01-05 PROCEDURE — 1159F MED LIST DOCD IN RCRD: CPT | Mod: CPTII,S$GLB,, | Performed by: INTERNAL MEDICINE

## 2023-01-05 PROCEDURE — 3080F DIAST BP >= 90 MM HG: CPT | Mod: CPTII,S$GLB,, | Performed by: INTERNAL MEDICINE

## 2023-01-05 PROCEDURE — 3008F BODY MASS INDEX DOCD: CPT | Mod: CPTII,S$GLB,, | Performed by: INTERNAL MEDICINE

## 2023-01-05 PROCEDURE — 97110 THERAPEUTIC EXERCISES: CPT | Mod: S$GLB,,, | Performed by: ORTHOPAEDIC SURGERY

## 2023-01-05 PROCEDURE — 1160F RVW MEDS BY RX/DR IN RCRD: CPT | Mod: CPTII,S$GLB,, | Performed by: ORTHOPAEDIC SURGERY

## 2023-01-05 PROCEDURE — 3008F BODY MASS INDEX DOCD: CPT | Mod: CPTII,S$GLB,, | Performed by: ORTHOPAEDIC SURGERY

## 2023-01-05 PROCEDURE — 99204 OFFICE O/P NEW MOD 45 MIN: CPT | Mod: S$GLB,,, | Performed by: ORTHOPAEDIC SURGERY

## 2023-01-05 PROCEDURE — 97110 PR THERAPEUTIC EXERCISES: ICD-10-PCS | Mod: S$GLB,,, | Performed by: ORTHOPAEDIC SURGERY

## 2023-01-05 RX ORDER — METHYLPREDNISOLONE 4 MG/1
TABLET ORAL
Qty: 21 EACH | Refills: 0 | Status: ON HOLD | OUTPATIENT
Start: 2023-01-05 | End: 2023-05-23 | Stop reason: HOSPADM

## 2023-01-05 NOTE — PROGRESS NOTES
CC: left shoulder pain    48 y.o. Male presents today for evaluation of his left shoulder pain. He admits to left anterior shoulder pain that began 01/03/2022 without known mechanism of injury. He admits to appreciating significantly reduced range of motion due to pain.   How long: Beginning 01/03/2022  What makes it better: Advil  What makes it worse: Raising his arm overhead  Does it radiate: Admits to his pain radiating to the left biceps  Attempted treatments: Admits to taking Advil as needed  Any mechanical symptoms: Denies  Feelings of instability: Denies  Affecting ADLs: Admits to this problem affecting his ability to perform ADLs      PAST MEDICAL HISTORY:   Past Medical History:   Diagnosis Date    Cataract     Diarrhea 2015    c-diff    Lattice degeneration, both eyes     MRSA (methicillin resistant staph aureus) culture positive     Stickler's syndrome        PAST SURGICAL HISTORY:   Past Surgical History:   Procedure Laterality Date    ABDOMINAL SURGERY      APPENDECTOMY      CATARACT EXTRACTION W/  INTRAOCULAR LENS IMPLANT Left 06/27/2016    Dr Parker     CATARACT EXTRACTION W/  INTRAOCULAR LENS IMPLANT Right 07/18/2016    Dr Parker     EYE SURGERY  June 2016    HERNIA REPAIR  January 2017    VASECTOMY  November 2019       FAMILY HISTORY:   Family History   Problem Relation Age of Onset    Cataracts Mother     Crohn's disease Mother     COPD Mother     Retinal detachment Father     Cataracts Father     No Known Problems Sister     Retinal detachment Brother     No Known Problems Daughter     No Known Problems Sister     Retinal detachment Sister     Scoliosis Sister     Scoliosis Sister     No Known Problems Daughter        SOCIAL HISTORY:   Social History     Socioeconomic History    Marital status:    Occupational History    Occupation:    Tobacco Use    Smoking status: Never    Smokeless tobacco: Never   Substance and Sexual Activity    Alcohol use: Yes     Alcohol/week: 10.0 standard drinks  "    Types: 8 Glasses of wine, 2 Standard drinks or equivalent per week     Comment: Occasional    Drug use: No    Sexual activity: Yes     Partners: Female     Birth control/protection: Partner-Vasectomy   Social History Narrative    Exercising intermittently     From [place], living in [place] with family       MEDICATIONS:     Current Outpatient Medications:     buPROPion (WELLBUTRIN XL) 300 MG 24 hr tablet, Take 1 tablet (300 mg total) by mouth once daily., Disp: 90 tablet, Rfl: 3    methylPREDNISolone (MEDROL DOSEPACK) 4 mg tablet, use as directed, Disp: 21 each, Rfl: 0    ALLERGIES:   Review of patient's allergies indicates:  No Known Allergies     PHYSICAL EXAMINATION:  BP (!) 122/90   Ht 6' 2" (1.88 m)   Wt 113.4 kg (250 lb)   BMI 32.10 kg/m²   Vitals signs and nursing note have been reviewed.  General: In no acute distress, well developed, well nourished, no diaphoresis  Eyes: EOM full and smooth, no eye redness or discharge  HENT: normocephalic and atraumatic, neck supple, trachea midline, no nasal discharge, no external ear redness or discharge  Cardiovascular: 2+ and symmetric radial bilaterally, no LE edema  Lungs: respirations non-labored, no conversational dyspnea   Abd: non-distended, no rigidity  MSK: no amputation or deformity, no swelling of extremities  Neuro: AAOx3, CN2-12 grossly intact  Skin: No rashes, warm and dry  Psychiatric: cooperative, pleasant, mood and affect appropriate for age    SHOULDER: LEFT  The affected shoulder is compared to the contralateral shoulder.    Observation:    CERVICAL SPINE  Normal head carriage. Normal thoracic kyphosis.  Full AROM in flexion, extension, sidebending, and rotation.    SHOULDER  No ecchymosis, edema, or erythema throughout the shoulder girdle.  No sternal, clavicular, or acromial deformities bilaterally.  No atrophy of the pectorals, deltoids, supraspinatus, infraspinatus, or biceps bilaterally.  No asymmetry of shoulders bilaterally.    ROM:  Active " flexion to 60° on left and 180° on right.   Active abduction to 50° on left and 180° on right.    Active internal rotation to unable to perform due to pain on left and T7 on right.    Active external rotation to unable to perform due to pain on left and T4 on right.    No scapular dyskinesia or winging.    Tenderness:  No tenderness at the SC or AC joint  No tenderness over the clavicle   + tenderness over biceps tendon in the bicipital groove  No tenderness over subacromial space  + tenderness over the deltoid muscle on the left  + tenderness over the greater tuberosity on the left    Strength Testing:  Deltoid - 5/5 on left and 5/5 on right  Biceps - 5/5 on left and 5/5 on right  Triceps - 5/5 on left and 5/5 on right  Wrist extension - 5/5 on left and 5/5 on right  Wrist flexion - 5/5 on left and 5/5 on right   - 5/5 on left and 5/5 on right  Finger extension - 5/5 on left and 5/5 on right  Finger abduction - 5/5 on left and 5/5 on right    Special Tests:  Empty can test - positive for pain  Full can test - positive for pain  Bear hug test - positive for pain  Belly press test - positive for pain  Resisted internal rotation - positive for pain  Resisted external rotation - positive for pain    Neer's test - positive for pain  Hawkin's-Jorge test - positive for pain    OWaldos test - positive for pain    Speed's test - positive for pain  Yergason's test - positive for pain    Sulcus sign - none  AP load and shift laxity - none  Anterior apprehension test - negative    Neurovascular Exam:  2+ radial pulses BL  Sensation intact to light touch in the distal median, radial, and ulnar nerve distributions bilaterally.  Spurlings test - negative  Lhermittes test - negative  Capillary refill intact <2 seconds in all digits bilaterally      IMAGIN. X-ray ordered due to left shoulder pain   2. X-ray images were reviewed personally by me and then directly with patient.  3. FINDINGS: X-ray images obtained  demonstrate no fracture or dislocation, large calcification adjacent to the greater tuberosity of the humerus.  4. IMPRESSION: As above.         ASSESSMENT:      ICD-10-CM ICD-9-CM   1. Acute pain of left shoulder  M25.512 719.41   2. Calcific tendinitis of left shoulder  M75.32 726.11         PLAN:  1-2.  Acute left shoulder pain/calcific tendinitis -     - Ryder admits to left anterior shoulder pain beginning 01/03/2022 without known mechanism of injury. He has appreciated significantly decreased range of motion due to pain and symptoms have only minimally improved with over-the-counter NSAIDs.     - XRs ordered in the office today and images were personally reviewed with the patient. See above for further detail.    - Symptoms, exam, and imaging are most consistent with calcific tendonitis.  We discussed the importance of decreasing inflammation and strengthening and stabilizing to help promote and maintain symptom improvement/resolution.  This is commonly accomplished with a short course of an anti-inflammatory and icing in addition to osteopathic manipulation, a home exercise program or physical therapy.    - Medrol Dosepack prescribed today.    - HEP for pendulums, wall crawls, rotator cuff stregnthening prescribed today. Handouts provided, explained, and exercises were demonstrated as needed. Encouraged to do daily. 82215 HOME EXERCISE PROGRAM (HEP):  The patient was taught a homegoing physical therapy regimen as described above by provider with assistance of sports medicine assistant. The patient demonstrated understanding of the exercises and proper technique of their execution. This interaction took 15 minutes.       Future planning includes - diagnostic ultrasound at follow-up visit if pain is not improved as his motion was too restricted today to properly perform the study, barbotage procedure or referral for TenJet    All questions were answered to the best of my ability and all concerns were addressed  at this time.    Follow up in 1 weeks for above, or sooner if needed.      This note is dictated using the M*Modal Fluency Direct word recognition program. There are word recognition mistakes that are occasionally missed on review.      Total time spent face-to face with patient counseling or coordinating care including prognosis, differential diagnosis, risks and benefits of treatment, instructions, compliance risk reductions as well as non-face-to-face time personally spent reviewing medial record, medical documentation, and coordination of care.     EST MINUTES X   28667 10-19    82489 20-29    [id number] 30-39    02615 40-54    NEW     81367 15-29    81130 30-44    20857 45-59 X   99205 60-74    PHONE      5-10    [id number] 11-20    47902 21-30

## 2023-01-05 NOTE — PROGRESS NOTES
"Subjective:   Patient ID: Ryder Mendoza is a 48 y.o. male  Chief complaint:   Chief Complaint   Patient presents with    Shoulder Pain       HPI  Pt here for urgent appt for acute left shoulder pain     Started with left shoulder pain 2 days ago   No fall or trauma or inciting event     Ttp on ant shoulder   No neck pain   Referred pain to shoulder and ant arm     + computer work   No standing desk     Located at [facility]   Yesterday shooting out to lateral left elbow    Played golf last week but issues after this until now   No numbness or weakness     Tried:   Cool pack with compression  Advil helps 600mg   Poor sleep     Side sleeper     Review of Systems    Objective:  Vitals:    01/05/23 1036   BP: (!) 122/90   BP Location: Right arm   Patient Position: Sitting   Pulse: 76   SpO2: 97%   Weight: 113.5 kg (250 lb 3.6 oz)   Height: 6' 2" (1.88 m)     Body mass index is 32.13 kg/m².    Physical Exam  Constitutional:       General: He is not in acute distress.     Appearance: He is well-developed. He is not diaphoretic.   Eyes:      General:         Right eye: No discharge.         Left eye: No discharge.      Extraocular Movements: Extraocular movements intact.      Conjunctiva/sclera: Conjunctivae normal.   Musculoskeletal:         General: Tenderness present.      Comments: Dec rom of left shoulder with extension, abduction, adduction due to pain   + ttp of left biceps tendon at bicipital groove   No ttp over c spine    Skin:     General: Skin is warm.      Capillary Refill: Capillary refill takes less than 2 seconds.      Coloration: Skin is not pale.      Findings: No erythema or rash.   Neurological:      Mental Status: He is alert and oriented to person, place, and time. Mental status is at baseline.      Sensory: No sensory deficit.   Psychiatric:         Behavior: Behavior normal.         Thought Content: Thought content normal.         Judgment: Judgment normal.       Assessment:  1. Acute pain of left " shoulder    2. Bicipital tendonitis of left shoulder        Plan:  Ryder was seen today for shoulder pain.    Diagnoses and all orders for this visit:    Acute pain of left shoulder  -     Ambulatory referral/consult to Orthopedics; Future  -     Ambulatory referral/consult to Physical/Occupational Therapy; Future    Bicipital tendonitis of left shoulder  -     Ambulatory referral/consult to Orthopedics; Future  -     Ambulatory referral/consult to Physical/Occupational Therapy; Future    Able to get appt with ortho today in 1 hour - discussed options including oral vs injection steroid - will defer to ortho if in agreement   Will sign for PT     Health Maintenance   Topic Date Due    Lipid Panel  11/19/2025    TETANUS VACCINE  12/02/2026    Hepatitis C Screening  Completed

## 2023-01-10 ENCOUNTER — PATIENT MESSAGE (OUTPATIENT)
Dept: SPORTS MEDICINE | Facility: CLINIC | Age: 48
End: 2023-01-10
Payer: COMMERCIAL

## 2023-01-10 ENCOUNTER — CLINICAL SUPPORT (OUTPATIENT)
Dept: REHABILITATION | Facility: OTHER | Age: 48
End: 2023-01-10
Attending: INTERNAL MEDICINE
Payer: COMMERCIAL

## 2023-01-10 DIAGNOSIS — M67.912 DYSFUNCTION OF LEFT ROTATOR CUFF: ICD-10-CM

## 2023-01-10 DIAGNOSIS — M75.22 BICIPITAL TENDONITIS OF LEFT SHOULDER: ICD-10-CM

## 2023-01-10 DIAGNOSIS — M25.512 ACUTE PAIN OF LEFT SHOULDER: ICD-10-CM

## 2023-01-10 DIAGNOSIS — M89.9 SCAPULAR DYSFUNCTION: ICD-10-CM

## 2023-01-10 PROCEDURE — 97140 MANUAL THERAPY 1/> REGIONS: CPT | Mod: PN

## 2023-01-10 PROCEDURE — 97161 PT EVAL LOW COMPLEX 20 MIN: CPT | Mod: PN

## 2023-01-10 PROCEDURE — 97110 THERAPEUTIC EXERCISES: CPT | Mod: PN

## 2023-01-12 ENCOUNTER — OFFICE VISIT (OUTPATIENT)
Dept: SPORTS MEDICINE | Facility: CLINIC | Age: 48
End: 2023-01-12
Payer: COMMERCIAL

## 2023-01-12 VITALS
DIASTOLIC BLOOD PRESSURE: 95 MMHG | SYSTOLIC BLOOD PRESSURE: 132 MMHG | HEIGHT: 74 IN | WEIGHT: 250 LBS | BODY MASS INDEX: 32.08 KG/M2

## 2023-01-12 DIAGNOSIS — M75.32 CALCIFIC TENDINITIS OF LEFT SHOULDER: ICD-10-CM

## 2023-01-12 DIAGNOSIS — M25.512 ACUTE PAIN OF LEFT SHOULDER: ICD-10-CM

## 2023-01-12 PROBLEM — M25.571 RIGHT ANKLE PAIN: Status: RESOLVED | Noted: 2018-01-04 | Resolved: 2023-01-12

## 2023-01-12 PROBLEM — M25.561 RIGHT KNEE PAIN: Status: RESOLVED | Noted: 2018-01-04 | Resolved: 2023-01-12

## 2023-01-12 PROCEDURE — 3075F SYST BP GE 130 - 139MM HG: CPT | Mod: CPTII,S$GLB,, | Performed by: ORTHOPAEDIC SURGERY

## 2023-01-12 PROCEDURE — 1159F PR MEDICATION LIST DOCUMENTED IN MEDICAL RECORD: ICD-10-PCS | Mod: CPTII,S$GLB,, | Performed by: ORTHOPAEDIC SURGERY

## 2023-01-12 PROCEDURE — 3008F BODY MASS INDEX DOCD: CPT | Mod: CPTII,S$GLB,, | Performed by: ORTHOPAEDIC SURGERY

## 2023-01-12 PROCEDURE — 99214 PR OFFICE/OUTPT VISIT, EST, LEVL IV, 30-39 MIN: ICD-10-PCS | Mod: S$GLB,,, | Performed by: ORTHOPAEDIC SURGERY

## 2023-01-12 PROCEDURE — 1160F RVW MEDS BY RX/DR IN RCRD: CPT | Mod: CPTII,S$GLB,, | Performed by: ORTHOPAEDIC SURGERY

## 2023-01-12 PROCEDURE — 76882 PR  US,EXTREMITY,NONVASCULAR,REAL-TIME IMAGE,LIMITED: ICD-10-PCS | Mod: S$GLB,,, | Performed by: ORTHOPAEDIC SURGERY

## 2023-01-12 PROCEDURE — 99999 PR PBB SHADOW E&M-EST. PATIENT-LVL III: ICD-10-PCS | Mod: PBBFAC,,, | Performed by: ORTHOPAEDIC SURGERY

## 2023-01-12 PROCEDURE — 1160F PR REVIEW ALL MEDS BY PRESCRIBER/CLIN PHARMACIST DOCUMENTED: ICD-10-PCS | Mod: CPTII,S$GLB,, | Performed by: ORTHOPAEDIC SURGERY

## 2023-01-12 PROCEDURE — 99214 OFFICE O/P EST MOD 30 MIN: CPT | Mod: S$GLB,,, | Performed by: ORTHOPAEDIC SURGERY

## 2023-01-12 PROCEDURE — 3008F PR BODY MASS INDEX (BMI) DOCUMENTED: ICD-10-PCS | Mod: CPTII,S$GLB,, | Performed by: ORTHOPAEDIC SURGERY

## 2023-01-12 PROCEDURE — 76882 US LMTD JT/FCL EVL NVASC XTR: CPT | Mod: S$GLB,,, | Performed by: ORTHOPAEDIC SURGERY

## 2023-01-12 PROCEDURE — 99999 PR PBB SHADOW E&M-EST. PATIENT-LVL III: CPT | Mod: PBBFAC,,, | Performed by: ORTHOPAEDIC SURGERY

## 2023-01-12 PROCEDURE — 3080F PR MOST RECENT DIASTOLIC BLOOD PRESSURE >= 90 MM HG: ICD-10-PCS | Mod: CPTII,S$GLB,, | Performed by: ORTHOPAEDIC SURGERY

## 2023-01-12 PROCEDURE — 1159F MED LIST DOCD IN RCRD: CPT | Mod: CPTII,S$GLB,, | Performed by: ORTHOPAEDIC SURGERY

## 2023-01-12 PROCEDURE — 3075F PR MOST RECENT SYSTOLIC BLOOD PRESS GE 130-139MM HG: ICD-10-PCS | Mod: CPTII,S$GLB,, | Performed by: ORTHOPAEDIC SURGERY

## 2023-01-12 PROCEDURE — 3080F DIAST BP >= 90 MM HG: CPT | Mod: CPTII,S$GLB,, | Performed by: ORTHOPAEDIC SURGERY

## 2023-01-12 RX ORDER — MELOXICAM 15 MG/1
15 TABLET ORAL DAILY
Qty: 30 TABLET | Refills: 0 | Status: ON HOLD | OUTPATIENT
Start: 2023-01-12 | End: 2023-05-23 | Stop reason: HOSPADM

## 2023-01-12 NOTE — PROGRESS NOTES
CC: left shoulder pain    Ryder is here today for follow up evaluation of his left shoulder pain. Patient reports his pain is 1/10 today. He admits to great improvement in his pain while taking the Medrol Dosepack, but states his pain returned upon completion. He admits to attending physical therapy and has been compliant with his HEP which has been helpful for him. He denies new injury or trauma since his last visit.     Recall from visit on 01/05/2023  48 y.o. Male presents today for evaluation of his left shoulder pain. He admits to left anterior shoulder pain that began 01/03/2022 without known mechanism of injury. He admits to appreciating significantly reduced range of motion due to pain.   How long: Beginning 01/03/2022  What makes it better: Advil  What makes it worse: Raising his arm overhead  Does it radiate: Admits to his pain radiating to the left biceps  Attempted treatments: Admits to taking Advil as needed  Any mechanical symptoms: Denies  Feelings of instability: Denies  Affecting ADLs: Admits to this problem affecting his ability to perform ADLs      PAST MEDICAL HISTORY:   Past Medical History:   Diagnosis Date    Cataract     Diarrhea 2015    c-diff    Lattice degeneration, both eyes     MRSA (methicillin resistant staph aureus) culture positive     Stickler's syndrome        PAST SURGICAL HISTORY:   Past Surgical History:   Procedure Laterality Date    ABDOMINAL SURGERY      APPENDECTOMY      CATARACT EXTRACTION W/  INTRAOCULAR LENS IMPLANT Left 06/27/2016    Dr Parker     CATARACT EXTRACTION W/  INTRAOCULAR LENS IMPLANT Right 07/18/2016    Dr Parker     EYE SURGERY  June 2016    HERNIA REPAIR  January 2017    VASECTOMY  November 2019       FAMILY HISTORY:   Family History   Problem Relation Age of Onset    Cataracts Mother     Crohn's disease Mother     COPD Mother     Retinal detachment Father     Cataracts Father     No Known Problems Sister     Retinal detachment Brother     No Known Problems  "Daughter     No Known Problems Sister     Retinal detachment Sister     Scoliosis Sister     Scoliosis Sister     No Known Problems Daughter        SOCIAL HISTORY:   Social History     Socioeconomic History    Marital status:    Occupational History    Occupation:    Tobacco Use    Smoking status: Never    Smokeless tobacco: Never   Substance and Sexual Activity    Alcohol use: Yes     Alcohol/week: 10.0 standard drinks     Types: 8 Glasses of wine, 2 Standard drinks or equivalent per week     Comment: Occasional    Drug use: No    Sexual activity: Yes     Partners: Female     Birth control/protection: Partner-Vasectomy   Social History Narrative    Exercising intermittently     From [facility], living in [facility] with family       MEDICATIONS:     Current Outpatient Medications:     buPROPion (WELLBUTRIN XL) 300 MG 24 hr tablet, Take 1 tablet (300 mg total) by mouth once daily., Disp: 90 tablet, Rfl: 3    meloxicam (MOBIC) 15 MG tablet, Take 1 tablet (15 mg total) by mouth once daily., Disp: 30 tablet, Rfl: 0    methylPREDNISolone (MEDROL DOSEPACK) 4 mg tablet, use as directed, Disp: 21 each, Rfl: 0    ALLERGIES:   Review of patient's allergies indicates:  No Known Allergies     PHYSICAL EXAMINATION:  BP (!) 132/95   Ht 6' 2" (1.88 m)   Wt 113.4 kg (250 lb)   BMI 32.10 kg/m²   Vitals signs and nursing note have been reviewed.  General: In no acute distress, well developed, well nourished, no diaphoresis  Eyes: EOM full and smooth, no eye redness or discharge  HENT: normocephalic and atraumatic, neck supple, trachea midline, no nasal discharge, no external ear redness or discharge  Cardiovascular: 2+ and symmetric radial bilaterally, no LE edema  Lungs: respirations non-labored, no conversational dyspnea   Abd: non-distended, no rigidity  MSK: no amputation or deformity, no swelling of extremities  Neuro: AAOx3, CN2-12 grossly intact  Skin: No rashes, warm and dry  Psychiatric: cooperative, pleasant, mood and " affect appropriate for age    SHOULDER: LEFT  The affected shoulder is compared to the contralateral shoulder.    Observation:    CERVICAL SPINE  Normal head carriage. Normal thoracic kyphosis.  Full AROM in flexion, extension, sidebending, and rotation.    SHOULDER  No ecchymosis, edema, or erythema throughout the shoulder girdle.  No sternal, clavicular, or acromial deformities bilaterally.  No atrophy of the pectorals, deltoids, supraspinatus, infraspinatus, or biceps bilaterally.  No asymmetry of shoulders bilaterally.    ROM:  Active flexion to 60° on left and 180° on right.   Active abduction to 50° on left and 180° on right.    Active internal rotation to unable to perform due to pain on left and T7 on right.    Active external rotation to unable to perform due to pain on left and T4 on right.    No scapular dyskinesia or winging.    Tenderness:  No tenderness at the SC or AC joint  No tenderness over the clavicle   + tenderness over biceps tendon in the bicipital groove  No tenderness over subacromial space  + tenderness over the deltoid muscle on the left  + tenderness over the greater tuberosity on the left    Strength Testing:  Deltoid - 5/5 on left and 5/5 on right  Biceps - 5/5 on left and 5/5 on right  Triceps - 5/5 on left and 5/5 on right  Wrist extension - 5/5 on left and 5/5 on right  Wrist flexion - 5/5 on left and 5/5 on right   - 5/5 on left and 5/5 on right  Finger extension - 5/5 on left and 5/5 on right  Finger abduction - 5/5 on left and 5/5 on right    Special Tests:  Empty can test - positive for pain  Full can test - positive for pain  Bear hug test - positive for pain  Belly press test - positive for pain  Resisted internal rotation - positive for pain  Resisted external rotation - positive for pain    Neer's test - positive for pain  Hawkin's-Jorge test - positive for pain    OWaldos test - positive for pain    Speed's test - positive for pain  Yergason's test - positive for  pain    Sulcus sign - none  AP load and shift laxity - none  Anterior apprehension test - negative    Neurovascular Exam:  2+ radial pulses BL  Sensation intact to light touch in the distal median, radial, and ulnar nerve distributions bilaterally.  Spurlings test - negative  Lhermittes test - negative  Capillary refill intact <2 seconds in all digits bilaterally      IMAGIN. X-ray obtained 2023 due to left shoulder pain   2. X-ray images were reviewed personally by me and then directly with patient.  3. FINDINGS: X-ray images obtained demonstrate no fracture or dislocation, large calcification adjacent to the greater tuberosity of the humerus.  4. IMPRESSION: As above.     DIAGNOSTIC ULTRASOUND FOCUSED: performed on 2023  1. Diagnostic Extremity - MSK-Sports Ultrasound was recommended due to acute left shoulder pain.  2. Diagnostic Extremity - MSK-Sports Ultrasound Performed: Bhupinder Kan Extremity Study: left shoulder.  TECHNIQUE: Real time ultrasound examination of the left shoulder was performed with SonoSite Edge 2, 9-L MHz linear probe(s).   FINDINGS: The images are of adequate diagnostic quality with identification of all echogenic structures made except for the vascular structures unless otherwise noted. There is no sonographic evidence of periosteal abnormalities, soft tissue edema, or nerve irregularities.   Biceps tendon:  Biceps tendon is intact.  No fluid around the biceps tendon.  Rotator cuff:  Supraspinatus -  Large calcification in the middle aspect of the supraspinatus tendon more pronounced along the articular surface consistent with calcific tendinitis.  Infraspinatus - intact and without any cortical irregularities at the attachment  Subscapularis - intact and without thinning or cortical irregularities.  AC joint:  Cortical irregularities at both the acromial and clavicular aspects of the joint without widening.  No effusion.  Subacromial bursa:  There is trace fluid in the  subacromial bursa consistent with bursitis.  The rest of the sonographic examination was unremarkable.  Ultrasound images were directly reviewed with the patient and then a treatment plan was discussed.  IMPRESSION:  Calcific tendinitis of the supraspinatus tendon.  Recommend MRI to further assess the area for consideration for TenJet procedure.      ASSESSMENT:      ICD-10-CM ICD-9-CM   1. Acute pain of left shoulder  M25.512 719.41   2. Calcific tendinitis of left shoulder  M75.32 726.11         PLAN:  1-2.  Acute left shoulder pain/calcific tendinitis - improved, then deteriorated    - Ryder admits to left anterior shoulder pain beginning 01/03/2022 without known mechanism of injury. He has appreciated significantly decreased range of motion due to pain and symptoms have only minimally improved with over-the-counter NSAIDs.     - He appreciated great improvement in his pain with Medrol Dosepack, but states his pain returned following completion. He has been compliant with his HEP and has been attending physical therapy.     - Diagnostic ultrasound performed in clinic today. See above for procedure detail. MRI ordered to further assess the area and for next step procedure planning.    - Symptoms, exam, and imaging are still most consistent with calcific tendonitis.  We discussed the importance of decreasing inflammation and strengthening and stabilizing to help promote and maintain symptom improvement/resolution.  This is commonly accomplished with a short course of an anti-inflammatory and icing in addition to osteopathic manipulation, a home exercise program or physical therapy.    - Continue with HEP for pendulums, wall crawls, rotator cuff stregnthening prescribed at previous visit.     - Mobic 15 mg to be taken daily for 2 weeks followed by as needed.     - Referral to Dr. Palmer for TenJet consultation after MRI is obtained.      Future planning includes - next steps pending MRI results    All questions were  answered to the best of my ability and all concerns were addressed at this time.    Follow up with Dr. Palmer after MRI for results and for TenJet consultation.       This note is dictated using the M*Modal Fluency Direct word recognition program. There are word recognition mistakes that are occasionally missed on review.      Total time spent face-to face with patient counseling or coordinating care including prognosis, differential diagnosis, risks and benefits of treatment, instructions, compliance risk reductions as well as non-face-to-face time personally spent reviewing medial record, medical documentation, and coordination of care.     EST MINUTES X   06260 10-19    [id number] 20-29    04354 30-39 X   99215 40-54    NEW     94117 15-29    38045 30-44    55917 45-59    83160 60-74    PHONE      5-10    [id number] 11-20    [id number] 21-30

## 2023-01-16 PROBLEM — M67.912 DYSFUNCTION OF LEFT ROTATOR CUFF: Status: ACTIVE | Noted: 2023-01-16

## 2023-01-16 PROBLEM — M89.9 SCAPULAR DYSFUNCTION: Status: ACTIVE | Noted: 2023-01-16

## 2023-01-17 ENCOUNTER — CLINICAL SUPPORT (OUTPATIENT)
Dept: REHABILITATION | Facility: OTHER | Age: 48
End: 2023-01-17
Payer: COMMERCIAL

## 2023-01-17 DIAGNOSIS — M67.912 DYSFUNCTION OF LEFT ROTATOR CUFF: Primary | ICD-10-CM

## 2023-01-17 DIAGNOSIS — M89.9 SCAPULAR DYSFUNCTION: ICD-10-CM

## 2023-01-17 PROCEDURE — 97110 THERAPEUTIC EXERCISES: CPT | Mod: PN

## 2023-01-17 PROCEDURE — 97112 NEUROMUSCULAR REEDUCATION: CPT | Mod: PN

## 2023-01-17 NOTE — PLAN OF CARE
[facility]   Physical Therapy Initial Evaluation     Date: 1/10/2023   Name: Ryder Mendoza  Clinic Number: [id number]    Therapy Diagnosis:   Encounter Diagnoses   Name Primary?    Acute pain of left shoulder     Bicipital tendonitis of left shoulder     Dysfunction of left rotator cuff     Scapular dysfunction      Physician: Zoie Corey MD    Physician Orders: PT Eval and Treat   Medical Diagnosis from Referral: M25.512 (ICD-10-CM) - Acute pain of left shoulder M75.22 (ICD-10-CM) - Bicipital tendonitis of left shoulder   Evaluation Date: 1/10/2023  Authorization Period Expiration: 01/05/2024   Plan of Care Expiration: 4/10/2023  Progress Note Due: 2/10/2023  Visit # / Visits authorized: 1/ 1   FOTO: 1/3    Precautions: Standard     Time In: 3:45pm  Time Out: 4:45pm  Total Appointment Time (timed & untimed codes): 60 minutes      SUBJECTIVE     Date of onset: ~1 week ago    History of current condition - Ryder reports: Acute onset of L shoulder pain [anterior, also down upper arm anteriorly>laterally] that started 1 week ago without HIMANSHU or trauma at onset. Went to MD, who told him it was calcific tendonitis and gave him a Medrol dose pack. Reports that he felt 100% improvement for several days, but as the dose back wore off his pain returned. Reports that pain is now worse than before, with it being more constant and intense. Has been managing sx with ice, compression, and OTC meds. C/c today is intense pain with movement, marked loss of ROM. Denies feelings of instability when moving within available range.  R hand dominant.    Falls: none    Imaging, bone scan films, shoulder, 01/2023: No acute fracture or dislocation seen.  Ossific densities adjacent to the greater tuberosity are nonspecific and can be seen with calcific tendinitis.   No significant soft tissue edema or radiopaque retained foreign body.    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  Recreation - tennis, golf  Prior Level of Function: independent  Current Level of Function: pain and difficulty with all functional activities, HHCs, self care as affected side is his dominant arm    Pain:  Current 1/10, worst 7/10, best 1/10   Location: left shoulder, upper arm (anterior > lateral)   Description: Sharp  Aggravating Factors: movement of the L shoulder, sleeping at night (LSL)  Easing Factors: ice, rest, and elevation medication   HEP from MD: frida, wall crawls, rotator cuff stregnthening     Patients goals: relieve pain, return to recreational activities and work painfree     Medical History:   Past Medical History:   Diagnosis Date    Cataract     Diarrhea 2015    c-diff    Lattice degeneration, both eyes     MRSA (methicillin resistant staph aureus) culture positive     Stickler's syndrome        Surgical History:   Ryder Mendoza  has a past surgical history that includes Appendectomy; Abdominal surgery; Cataract extraction w/  intraocular lens implant (Left, 06/27/2016); Cataract extraction w/  intraocular lens implant (Right, 07/18/2016); Eye surgery (June 2016); Hernia repair (January 2017); and Vasectomy (November 2019).    Medications:   Ryder has a current medication list which includes the following prescription(s): bupropion, meloxicam, and methylprednisolone.    Allergies:   Review of patient's allergies indicates:  No Known Allergies       OBJECTIVE     Observation: pt enters clinic donning wrap around compressive ice pack over L shoulder, arm in guarded posture ADD/IR  Palpation: TTP biceps LH tendon, lateral acromial space (RTC T. Attachments)    Cervical AROM:WFL    Shoulder Right   Left  Pain/Dysfunction with Movement    AROM MMT AROM PROM MMT    flexion 170 5 35* 80* 2+ *indicates pain, MMT limited by pain/mm guarding   abduction 170 5 35* 70* 2+    adduction Past midline 5 At side, UA to midline - 4-  "   Internal rotation T7 5 Greater trochanter* <30* 3+    ER at 90° abd 90 - UA UA UA NT 2* to unavailable ROM   ER at 0° abd 90 5 15* 15* 2+      Scapular Strength: Right Left   -Upper Trap  5/5 5/5  -Mid Trap   4-/5 NT 2* pain, inability to assume test position  -Low Trap   4-/5 NT 2* pain, inability to assume test position  -Rhomboids   4/5 NT 2* pain, inability to assume test position  -Serratus Anterior  4/5 NT 2* pain, inability to assume test position    Elbow Strength: Right Left   -Flexion  5/5 3-/5 with increased pain  -Extension  5/5 4/5    Flexibility:   - Pectoralis Major/Minor: mod hypo (L)  - Latissimus Dorsi: hypo  -Subscapularis: hypo    Joint Mobility: mild hypo Inf, AP glides  Special Tests:    -Sulcus Sign: --  -Biceps Load II: NT 2* inability to assume test position due to pain  -Compression-Grind: NT 2* inability to assume test position due to pain  - Yergasons Test: --  - Apprehension Sign: +  - Supraspinatus (Empty Can Test): NT 2* inability to assume test position due to pain  - Rene: +  - Neers: NT 2* inability to assume test position due to pain  - Speeds: NT 2* inability to assume test position due to pain    Scapular Control/Dyskinesis:     Normal / Subtle / Obvious  Comments    Left  obvious Early hiking, poor control with descent (retraction, down rotation)    Right  subtle             Limitation/Restriction for FOTO shoulder Survey    Therapist reviewed FOTO scores for Ryder Mendoza on 1/10/2023.   FOTO documents entered into [facility] - see Media section.    Limitation Score: 58%         TREATMENT     Total Treatment time (time-based codes) separate from Evaluation: 40 minutes      Ryder received the treatments listed below:      therapeutic exercises to develop strength, endurance, ROM, flexibility, posture, and core stabilization for 25 minutes including:  SL sleeper stretch 5 x 10" - pt demo's increased abd > flex AROM afterwards  Standing sleeper stretch - demo'd for " "HEP  IR walk outs x 20 x GTB  ER walk outs x 20 x RTB  IR strap stretch 10 x 5" - demo'd for HEP  Patient edu, including review of previously given HEP from MD (wall slides, pendulums) as well as including functional reach IR stretch in the shower to improve mobility, with pain modulation from warm water x 10'    manual therapy techniques: Joint mobilizations, Myofacial release, Soft tissue Mobilization, and Friction Massage were applied to the: left shoulder for 15 minutes, includin min x Joint mobs - GHJ AP, inf glides (Gr II-III), distraction with Gr II-III oscillations - verbalizes "good stretch" during inf glides  10 min x preparation and application of kinesiotape for biceps repositioning and inhibition (1 x I strip), ACJ compression (1 x I strip), and 1 x Y strip for GHJ approximation.     neuromuscular re-education activities to improve: Balance, Coordination, Kinesthetic, Sense, Proprioception, and Posture for 00 minutes. The following activities were included:  None today.  Add prone scapular series (unilat) next visit; SL scapular series in future.        PATIENT EDUCATION AND HOME EXERCISES     Education provided:   - Patient educated regarding pathogenesis, diagnosis, protocol, prognosis, POC, and HEP, including use of visual assistance for understanding of anatomy and dysfunction. Written Home Exercises Provided with written and verbal instructions for frequency and duration of the following exercises: see list above. Pt educated on HEP and activity modifications to reduce c/o pain and improve overall function.   - Pt was educated in posture and body mechanics.  Use of a lumbar roll was recommended and demonstrated here today.  Purchase information provided.   - Pt also educated on use of modalities prn to reduce c/o pain and dysfunction.       Written Home Exercises Provided: yes. Exercises were reviewed and Ryder was able to demonstrate them prior to the end of the session.  Ryder demonstrated " good  understanding of the education provided. See EMR under Patient Instructions for exercises provided during therapy sessions.    ASSESSMENT     Ryder is a 48 y.o. male referred to outpatient Physical Therapy with a medical diagnosis of left shoulder pain, biceps tendinopathy. Patient presents with marked limitations in ROM, joint and myofascial mobility, flexibility, strength, postural awareness/endurance, motor control and coordination. S/s associated with referring diagnosis with scapular dyskinesia and weakness that facilitate c/o pain. Impairments limit pt with all functional activities including reaching OH with self care, HHCs.      Patient prognosis is Good.   Patient will benefit from skilled outpatient Physical Therapy to address the deficits stated above and in the chart below, provide patient /family education, and to maximize patientt's level of independence.     Plan of care discussed with patient: Yes  Patient's spiritual, cultural and educational needs considered and patient is agreeable to the plan of care and goals as stated below:     Anticipated Barriers for therapy: standard    Medical Necessity is demonstrated by the following  History  Co-morbidities and personal factors that may impact the plan of care Co-morbidities:   difficulty sleeping and level of undertstanding of current condition    Personal Factors:   social background  lifestyle     low   Examination  Body Structures and Functions, activity limitations and participation restrictions that may impact the plan of care Body Regions:   back  upper extremities  trunk    Body Systems:    gross symmetry  ROM  strength  gross coordinated movement  transitions  motor control  motor learning  Joint mobility, flexibility, postural awareness/endurance    Participation Restrictions:   ADLs, HHCs, self care, sleeping, work, recreation    Activity limitations:   Learning and applying knowledge  no deficits    General Tasks and Commands  no  deficits    Communication  no deficits    Mobility  lifting and carrying objects  driving (bike, car, motorcycle)    Self care  washing oneself (bathing, drying, washing hands)  caring for body parts (brushing teeth, shaving, grooming)  toileting  dressing  eating  drinking  looking after one's health    Domestic Life  shopping  cooking  doing house work (cleaning house, washing dishes, laundry)  assisting others    Interactions/Relationships  no deficits    Life Areas  employment    Community and Social Life  community life  recreation and leisure         high   Clinical Presentation stable and uncomplicated low   Decision Making/ Complexity Score: low     Goals:  Short Term Goals (6 Weeks):   1. Pt to demonstrate improved ROM by 50% to allow pt to perform self care activities with increased ease.  2. Pt to demonstrate improved flexibility by a half grade to allow improved ADLs with increased ease.   3. Pt will report <3/10 pain within the left shoulder for ease with donning/doffing sling.  4. Pt will report being independent with his HEP for maintenance of improvements gained during therapy sessions  5. Pt to demonstrate improved functional ability with FOTO limitation <=46% disability.    Long Term Goals (12 Weeks):   1. Pt to demonstrate improved ROM to WNL, R=L, to allow pt to perform self care with increased ease.  2. Pt to demonstrate improved flexibility by a full grade to allow improved postural alignment with increased ease.  3. Pt will demonstrate >=4+/5 strength within the left scapula and shoulder for ease with house hold chores  4. Pt to demonstrate improved functional ability with FOTO limitation <=36% disability.  5. Pt independent with HEP and demonstrates good return technique.      PLAN   Plan of care Certification: 1/10/2023 to 4/10/2023.    Outpatient Physical Therapy 2 times weekly for 12 weeks to include the following interventions: Aquatic Therapy, Electrical Stimulation prn with dry  needling, Iontophoresis (with dexamethasone prn), Manual Therapy, Moist Heat/ Ice, Neuromuscular Re-ed, Patient Education, Self Care, Therapeutic Activities, and Therapeutic Exercise. Progress HEP towards D/C. Recommend F/U with MD if symptoms worsen or do not resolve. Patient may be seen by a PTA for treatment to carry out their plan of care.  Face-to-face conferences will be held.     Maria Licona, PT      I CERTIFY THE NEED FOR THESE SERVICES FURNISHED UNDER THIS PLAN OF TREATMENT AND WHILE UNDER MY CARE   Physician's comments:     Physician's Signature: ___________________________________________________

## 2023-01-17 NOTE — PROGRESS NOTES
"[facility]   Physical Therapy Treatment Note     Name: Ryder Mendoza  Clinic Number: [id number]    Therapy Diagnosis:   Encounter Diagnoses   Name Primary?    Dysfunction of left rotator cuff Yes    Scapular dysfunction      Physician: Zoie Corey MD    Visit Date: 2023    Physician Orders: PT Eval and Treat   Medical Diagnosis from Referral: M25.512 (ICD-10-CM) - Acute pain of left shoulder M75.22 (ICD-10-CM) - Bicipital tendonitis of left shoulder   Evaluation Date: 1/10/2023  Authorization Period Expiration: 2024   Plan of Care Expiration: 4/10/2023  Progress Note Due: 2/10/2023  Visit # / Visits authorized:    FOTO: 1/3     Precautions: Standard     PTA Visit #: 0/5     Time In: 4:30  Time Out: 5:30  Total Billable Time: 60 minutes    SUBJECTIVE     Pt reports: arm is moving better, finds HEP helpful. Feeling sore after visit with Dr. Kan, notes bruising along biceps from his manual therapy.  He was compliant with home exercise program.  Response to previous treatment: good  Functional change: improved ROM    Pain: 3/10  Location: left shoulder, upper arm (anterior > lateral)     OBJECTIVE     Objective Measures updated at progress report unless specified.     Treatment     Ryder received the treatments listed below:      therapeutic exercises to develop strength, endurance, ROM, flexibility, posture, and core stabilization for 30 minutes including:    +UBE: 3' fwd, 3' bkd (collected hx/cc, education on causes of RTC dysfunction; importance of exercise)  SL sleeper stretch 5 x 10" - pt demo's increased abd > flex AROM afterwards  IR walk outs x 20 x GTB  ER walk outs x 20 x RTB  IR strap stretch 10 x 5" - demo'd for HEP  +robberies x 20 x GTB     manual therapy techniques: Joint mobilizations, Myofacial release, Soft tissue Mobilization, and Friction Massage were applied to the: left shoulder for 00 minutes, includin min x Joint mobs - GHJ AP, inf " "glides (Gr II-III), distraction with Gr II-III oscillations - verbalizes "good stretch" during inf glides  00 min x preparation and application of kinesiotape for biceps repositioning and inhibition (1 x I strip), ACJ compression (1 x I strip), and 1 x Y strip for GHJ approximation.     neuromuscular re-education activities to improve: Balance, Coordination, Kinesthetic, Sense, Proprioception, and Posture for 30 minutes. The following activities were included:  +Unilateral over EOB   Prone I 3x10x3"   Prone T 3x10x3"   Prone Y 3x10x3"  +SL ER x 20  +SL flex to 90 x 20  +SL abd (gator chomp) x 20           Patient Education and Home Exercises     Home Exercises Provided and Patient Education Provided     Education provided:   - none today  -1/17/23 - updated HEP to include prone I, Y to promote scapular strength (defer T's at home due to c/o increased pain over anterior shoulder)    Written Home Exercises Provided: Patient instructed to cont prior HEP. Exercises were reviewed and Ryder was able to demonstrate them prior to the end of the session.  Ryder demonstrated good  understanding of the education provided. See EMR under Patient Instructions for exercises provided during therapy sessions    ASSESSMENT     Good return technique with HEP, indicating good compliance at home. Progressed NMRE exercises today to promote scapular strength, motor control/coordination. Intermittent VC/TC for scapular placement to limit hyperextension at GHJ. Good tolerance overall with appropriate training effect noted.  Updated HEP to promote scapular strength, endurance at home.    Ryder Is progressing well towards his goals.   Pt prognosis is Good.     Pt will continue to benefit from skilled outpatient physical therapy to address the deficits listed in the problem list box on initial evaluation, provide pt/family education and to maximize pt's level of independence in the home and community environment.     Pt's spiritual, " cultural and educational needs considered and pt agreeable to plan of care and goals.     Anticipated barriers to physical therapy: standard     Goals: updated 1/17/2023   Short Term Goals (6 Weeks):   1. Pt to demonstrate improved ROM by 50% to allow pt to perform self care activities with increased ease.  2. Pt to demonstrate improved flexibility by a half grade to allow improved ADLs with increased ease.   3. Pt will report <3/10 pain within the left shoulder for ease with donning/doffing sling.  4. Pt will report being independent with his HEP for maintenance of improvements gained during therapy sessions  5. Pt to demonstrate improved functional ability with FOTO limitation <=46% disability.     Long Term Goals (12 Weeks):   1. Pt to demonstrate improved ROM to WNL, R=L, to allow pt to perform self care with increased ease.  2. Pt to demonstrate improved flexibility by a full grade to allow improved postural alignment with increased ease.  3. Pt will demonstrate >=4+/5 strength within the left scapula and shoulder for ease with house hold chores  4. Pt to demonstrate improved functional ability with FOTO limitation <=36% disability.  5. Pt independent with HEP and demonstrates good return technique.       PLAN     Continue with POC for mobility, strength, stabilization.    Maria Licona, PT

## 2023-01-17 NOTE — PROGRESS NOTES
DIAGNOSTIC ULTRASOUND FOCUSED: performed on 1/12/2023  1. Diagnostic Extremity - [facility]-Sports Ultrasound was recommended due to acute left shoulder pain.  2. Diagnostic Extremity - [facility]Sports Ultrasound Performed: Bhupinder Kan Extremity Study: left shoulder.    TECHNIQUE: Real time ultrasound examination of the left shoulder was performed with [employer] Edge 2, 9-L MHz linear probe(s).     FINDINGS: The images are of adequate diagnostic quality with identification of all echogenic structures made except for the vascular structures unless otherwise noted. There is no sonographic evidence of periosteal abnormalities, soft tissue edema, or nerve irregularities.     Biceps tendon:  Biceps tendon is intact.  No fluid around the biceps tendon.    Rotator cuff:  Supraspinatus -  Large calcification in the middle aspect of the supraspinatus tendon more pronounced along the articular surface consistent with calcific tendinitis.  Infraspinatus - intact and without any cortical irregularities at the attachment  Subscapularis - intact and without thinning or cortical irregularities.    AC joint:  Cortical irregularities at both the acromial and clavicular aspects of the joint without widening.  No effusion.    Subacromial bursa:  There is trace fluid in the subacromial bursa consistent with bursitis.    The rest of the sonographic examination was unremarkable.    Ultrasound images were directly reviewed with the patient and then a treatment plan was discussed.    IMPRESSION:  Calcific tendinitis of the supraspinatus tendon.  Recommend MRI to further assess the area for consideration for TenJet procedure.

## 2023-01-21 ENCOUNTER — HOSPITAL ENCOUNTER (OUTPATIENT)
Dept: RADIOLOGY | Facility: OTHER | Age: 48
Discharge: HOME | End: 2023-01-21
Attending: ORTHOPAEDIC SURGERY
Payer: COMMERCIAL

## 2023-01-21 DIAGNOSIS — M25.512 ACUTE PAIN OF LEFT SHOULDER: ICD-10-CM

## 2023-01-21 DIAGNOSIS — M75.32 CALCIFIC TENDINITIS OF LEFT SHOULDER: ICD-10-CM

## 2023-01-21 PROCEDURE — 73221 MRI JOINT UPR EXTREM W/O DYE: CPT | Mod: 26,LT,, | Performed by: RADIOLOGY

## 2023-01-21 PROCEDURE — 73221 MRI SHOULDER WITHOUT CONTRAST LEFT: ICD-10-PCS | Mod: 26,LT,, | Performed by: RADIOLOGY

## 2023-01-21 PROCEDURE — 73221 MRI JOINT UPR EXTREM W/O DYE: CPT | Mod: TC,LT

## 2023-01-24 ENCOUNTER — CLINICAL SUPPORT (OUTPATIENT)
Dept: REHABILITATION | Facility: OTHER | Age: 48
End: 2023-01-24
Payer: COMMERCIAL

## 2023-01-24 ENCOUNTER — PATIENT MESSAGE (OUTPATIENT)
Dept: INTERNAL MEDICINE | Facility: CLINIC | Age: 48
End: 2023-01-24
Payer: COMMERCIAL

## 2023-01-24 DIAGNOSIS — M89.9 SCAPULAR DYSFUNCTION: ICD-10-CM

## 2023-01-24 DIAGNOSIS — Z12.11 SCREENING FOR COLON CANCER: Primary | ICD-10-CM

## 2023-01-24 DIAGNOSIS — M67.912 DYSFUNCTION OF LEFT ROTATOR CUFF: Primary | ICD-10-CM

## 2023-01-24 PROCEDURE — 97530 THERAPEUTIC ACTIVITIES: CPT | Mod: PN

## 2023-01-24 PROCEDURE — 97112 NEUROMUSCULAR REEDUCATION: CPT | Mod: PN

## 2023-01-24 PROCEDURE — 97110 THERAPEUTIC EXERCISES: CPT | Mod: PN

## 2023-01-24 NOTE — PROGRESS NOTES
"[facility]   Physical Therapy Treatment Note     Name: Ryder Mendoza  Clinic Number: [id number]    Therapy Diagnosis:   Encounter Diagnoses   Name Primary?    Dysfunction of left rotator cuff Yes    Scapular dysfunction        Physician: Zoie Corey MD    Visit Date: 1/24/2023    Physician Orders: PT Eval and Treat   Medical Diagnosis from Referral: M25.512 (ICD-10-CM) - Acute pain of left shoulder M75.22 (ICD-10-CM) - Bicipital tendonitis of left shoulder   Evaluation Date: 1/10/2023  Authorization Period Expiration: 01/05/2024   Plan of Care Expiration: 4/10/2023  Progress Note Due: 2/10/2023  Visit # / Visits authorized: 3/21   FOTO: 1/3     Precautions: Standard     PTA Visit #: 0/5     Time In: 4:30  Time Out: 5:30  Total Billable Time: 60 minutes    SUBJECTIVE     Pt reports: having been consistent with HEP. Notes very good improvement in arm ROM, happy with progress. As he is right handed, wants to know when he can return to tennis, sports?    He was compliant with home exercise program.  Response to previous treatment: good  Functional change: improved ROM    Pain: 1/10  Location: left shoulder, upper arm (anterior > lateral)     OBJECTIVE     Objective Measures updated at progress report unless specified.     1/24/2023  Left Shoulder A(P)ROM:  Flexion: 165 (170)  Abduction: 160 (170), catch noted at 140 deg    Treatment     Ryder received the treatments listed below:      therapeutic exercises to develop strength, endurance, ROM, flexibility, posture, and core stabilization for 10 minutes including:    UBE: 3' fwd, 3' bkd (collected hx/cc, education on causes of RTC dysfunction; importance of exercise)  SL sleeper stretch 5 x 10" - pt demo's increased abd > flex AROM afterwards  IR walk outs x 20 x GTB  ER walk outs x 20 x RTB - changed to 90-90  IR strap stretch 10 x 5" - demo'd for HEP  robberies x 20 x GTB     manual therapy techniques: Joint mobilizations, " "Myofacial release, Soft tissue Mobilization, and Friction Massage were applied to the: left shoulder for 00 minutes, includin min x Joint mobs - GHJ AP, inf glides (Gr II-III), distraction with Gr II-III oscillations - verbalizes "good stretch" during inf glides  00 min x preparation and application of kinesiotape for biceps repositioning and inhibition (1 x I strip), ACJ compression (1 x I strip), and 1 x Y strip for GHJ approximation.     neuromuscular re-education activities to improve: Balance, Coordination, Kinesthetic, Sense, Proprioception, and Posture for 30 minutes. The following activities were included:  +Unilateral over EOB   Prone I 3x10x3" x 2#   Prone T 1x10x3" x 2# palm down, 2 x10x3" x 2# thumb up   Prone Y 3x10x3" x 2#  +SL ER x 20  +SL flex to 90 x 20  +SL abd (gator chomp) x 20    +wall slides x 15 x medium loop  +wall clocks x 10 x medium loop     dynamic functional therapeutic activities to improve functional performance for 20 minutes. Including:   +90-90 ER with OH press x 20 x GTB  +lawnmower (side facing) x 30 x BTB  +zombie carry 1 lap around gym x 5# DB (L only) - fatigue and pulling pain after 1/2 lap    Patient Education and Home Exercises     Home Exercises Provided and Patient Education Provided     Education provided:   - none today  -23 - updated HEP to include prone I, Y to promote scapular strength (defer T's at home due to c/o increased pain over anterior shoulder)  -23 - updated HEP, added prone T (palm down, thumb up), 90-90 ER, 90-90 ER into OH press. GTB given today.  Written Home Exercises Provided: Patient instructed to cont prior HEP. Exercises were reviewed and Ayeon was able to demonstrate them prior to the end of the session.  Ayeon demonstrated good  understanding of the education provided. See EMR under Patient Instructions for exercises provided during therapy sessions    ASSESSMENT     Reassessment of AROM noted as pt self reports min to no pain " and improved ROM since last visit. Pt presents with marked improvement in ROM mobility, despite slight juddering and catch at 140 abduction. Progressed RTC strengthening at 90 degrees, as well as pec flexibility today. Good tolerance with smooth AROM, equal kylah by end of session. Updated HEP to promote pec flexibility, biceps flexibility and OH scapular and RTC strength, GHJ stabilization. Good return technique noted with appropriate training effect today.  As pt is right handed, suggested he is ok to return to tennis, but to stretch biceps and pecs during and afterwards if he encounters sx flare up.    Ryder Is progressing well towards his goals.   Pt prognosis is Good.     Pt will continue to benefit from skilled outpatient physical therapy to address the deficits listed in the problem list box on initial evaluation, provide pt/family education and to maximize pt's level of independence in the home and community environment.     Pt's spiritual, cultural and educational needs considered and pt agreeable to plan of care and goals.     Anticipated barriers to physical therapy: standard     Goals: updated 1/24/2023   Short Term Goals (6 Weeks):   1. Pt to demonstrate improved ROM by 50% to allow pt to perform self care activities with increased ease. (Not met, progressing)  2. Pt to demonstrate improved flexibility by a half grade to allow improved ADLs with increased ease.  (Not met, progressing)  3. Pt will report <3/10 pain within the left shoulder for ease with donning/doffing sling. (Not met, progressing)  4. Pt will report being independent with his HEP for maintenance of improvements gained during therapy sessions (Not met, progressing)  5. Pt to demonstrate improved functional ability with FOTO limitation <=46% disability. (Not met, progressing)     Long Term Goals (12 Weeks):   1. Pt to demonstrate improved ROM to WNL, R=L, to allow pt to perform self care with increased ease. (Not met, progressing)  2. Pt  to demonstrate improved flexibility by a full grade to allow improved postural alignment with increased ease. (Not met, progressing)  3. Pt will demonstrate >=4+/5 strength within the left scapula and shoulder for ease with house hold chores (Not met, progressing)  4. Pt to demonstrate improved functional ability with FOTO limitation <=36% disability. (Not met, progressing)  5. Pt independent with HEP and demonstrates good return technique. (Not met, progressing)       PLAN     Continue with POC for mobility, strength, stabilization.    Maria Licona, PT

## 2023-01-24 NOTE — TELEPHONE ENCOUNTER
So sorry to hear that!   - I signed order  Please let him know that the phone call to schedule this is what is actually being scheduled to avoid phone tag and anyone slipping through the cracks.   This has been helpful to streamline scheduling for patients moving forward.

## 2023-01-26 ENCOUNTER — OFFICE VISIT (OUTPATIENT)
Dept: SPORTS MEDICINE | Facility: CLINIC | Age: 48
End: 2023-01-26
Payer: COMMERCIAL

## 2023-01-26 VITALS
HEART RATE: 77 BPM | DIASTOLIC BLOOD PRESSURE: 85 MMHG | WEIGHT: 242 LBS | BODY MASS INDEX: 31.06 KG/M2 | SYSTOLIC BLOOD PRESSURE: 126 MMHG | HEIGHT: 74 IN

## 2023-01-26 DIAGNOSIS — M25.512 ACUTE PAIN OF LEFT SHOULDER: Primary | ICD-10-CM

## 2023-01-26 DIAGNOSIS — M75.32 CALCIFIC TENDINITIS OF LEFT SHOULDER: ICD-10-CM

## 2023-01-26 PROCEDURE — 1160F RVW MEDS BY RX/DR IN RCRD: CPT | Mod: CPTII,S$GLB,, | Performed by: STUDENT IN AN ORGANIZED HEALTH CARE EDUCATION/TRAINING PROGRAM

## 2023-01-26 PROCEDURE — 99999 PR PBB SHADOW E&M-EST. PATIENT-LVL III: CPT | Mod: PBBFAC,,, | Performed by: STUDENT IN AN ORGANIZED HEALTH CARE EDUCATION/TRAINING PROGRAM

## 2023-01-26 PROCEDURE — 1160F PR REVIEW ALL MEDS BY PRESCRIBER/CLIN PHARMACIST DOCUMENTED: ICD-10-PCS | Mod: CPTII,S$GLB,, | Performed by: STUDENT IN AN ORGANIZED HEALTH CARE EDUCATION/TRAINING PROGRAM

## 2023-01-26 PROCEDURE — 3079F DIAST BP 80-89 MM HG: CPT | Mod: CPTII,S$GLB,, | Performed by: STUDENT IN AN ORGANIZED HEALTH CARE EDUCATION/TRAINING PROGRAM

## 2023-01-26 PROCEDURE — 99213 PR OFFICE/OUTPT VISIT, EST, LEVL III, 20-29 MIN: ICD-10-PCS | Mod: S$GLB,,, | Performed by: STUDENT IN AN ORGANIZED HEALTH CARE EDUCATION/TRAINING PROGRAM

## 2023-01-26 PROCEDURE — 99213 OFFICE O/P EST LOW 20 MIN: CPT | Mod: S$GLB,,, | Performed by: STUDENT IN AN ORGANIZED HEALTH CARE EDUCATION/TRAINING PROGRAM

## 2023-01-26 PROCEDURE — 3074F PR MOST RECENT SYSTOLIC BLOOD PRESSURE < 130 MM HG: ICD-10-PCS | Mod: CPTII,S$GLB,, | Performed by: STUDENT IN AN ORGANIZED HEALTH CARE EDUCATION/TRAINING PROGRAM

## 2023-01-26 PROCEDURE — 3074F SYST BP LT 130 MM HG: CPT | Mod: CPTII,S$GLB,, | Performed by: STUDENT IN AN ORGANIZED HEALTH CARE EDUCATION/TRAINING PROGRAM

## 2023-01-26 PROCEDURE — 1159F PR MEDICATION LIST DOCUMENTED IN MEDICAL RECORD: ICD-10-PCS | Mod: CPTII,S$GLB,, | Performed by: STUDENT IN AN ORGANIZED HEALTH CARE EDUCATION/TRAINING PROGRAM

## 2023-01-26 PROCEDURE — 3008F PR BODY MASS INDEX (BMI) DOCUMENTED: ICD-10-PCS | Mod: CPTII,S$GLB,, | Performed by: STUDENT IN AN ORGANIZED HEALTH CARE EDUCATION/TRAINING PROGRAM

## 2023-01-26 PROCEDURE — 3079F PR MOST RECENT DIASTOLIC BLOOD PRESSURE 80-89 MM HG: ICD-10-PCS | Mod: CPTII,S$GLB,, | Performed by: STUDENT IN AN ORGANIZED HEALTH CARE EDUCATION/TRAINING PROGRAM

## 2023-01-26 PROCEDURE — 1159F MED LIST DOCD IN RCRD: CPT | Mod: CPTII,S$GLB,, | Performed by: STUDENT IN AN ORGANIZED HEALTH CARE EDUCATION/TRAINING PROGRAM

## 2023-01-26 PROCEDURE — 99999 PR PBB SHADOW E&M-EST. PATIENT-LVL III: ICD-10-PCS | Mod: PBBFAC,,, | Performed by: STUDENT IN AN ORGANIZED HEALTH CARE EDUCATION/TRAINING PROGRAM

## 2023-01-26 PROCEDURE — 3008F BODY MASS INDEX DOCD: CPT | Mod: CPTII,S$GLB,, | Performed by: STUDENT IN AN ORGANIZED HEALTH CARE EDUCATION/TRAINING PROGRAM

## 2023-01-26 NOTE — PROGRESS NOTES
CC: left shoulder pain    48 y.o. Male presents today for TenJet consultation of his chronic left shoulder pain. Pt was referred by Dr. Bhupinder Kan. Pt reports significant improvement recently with fPT and meloxicam.     Attempted treatments: meloxicam, fPT  Pain score: 0/10 today; 4/10 at worst recently   History of trauma/injury: none since last visit with Dr. Bhupinder Kan  Affecting ADLs: no     REVIEW OF SYSTEMS:   Constitution: Patient denies fever or chills.  Eyes: Patient denies eye pain or vision changes.  HEENT: Patient denies ear pain, sore throat, or nasal discharge.  CVS: Patient denies chest pain.  Lungs: Patient denies shortness of breath or cough.  Abdomen: Patient denies any stomach pain, nausea, vomiting, or diarrhea  Skin: Patient denies skin rash or itching.    Musculoskeletal: Patient denies recent injuries or trauma.  Neuro: Patient denies any numbness or tingling in upper or lower extremities.  Psych: Patient denies any current anxiety or nervousness.    PAST MEDICAL HISTORY:   Past Medical History:   Diagnosis Date    Cataract     Diarrhea 2015    c-diff    Lattice degeneration, both eyes     MRSA (methicillin resistant staph aureus) culture positive     Stickler's syndrome        PAST SURGICAL HISTORY:  Past Surgical History:   Procedure Laterality Date    ABDOMINAL SURGERY      APPENDECTOMY      CATARACT EXTRACTION W/  INTRAOCULAR LENS IMPLANT Left 06/27/2016    Dr Parker     CATARACT EXTRACTION W/  INTRAOCULAR LENS IMPLANT Right 07/18/2016    Dr Parker     EYE SURGERY  June 2016    HERNIA REPAIR  January 2017    VASECTOMY  November 2019       FAMILY HISTORY:  Family History   Problem Relation Age of Onset    Cataracts Mother     Crohn's disease Mother     COPD Mother     Retinal detachment Father     Cataracts Father     No Known Problems Sister     Retinal detachment Brother     No Known Problems Daughter     No Known Problems Sister     Retinal detachment Sister     Scoliosis Sister      "Scoliosis Sister     No Known Problems Daughter        SOCIAL HISTORY:  Social History     Socioeconomic History    Marital status:    Occupational History    Occupation:    Tobacco Use    Smoking status: Never    Smokeless tobacco: Never   Substance and Sexual Activity    Alcohol use: Yes     Alcohol/week: 10.0 standard drinks     Types: 8 Glasses of wine, 2 Standard drinks or equivalent per week     Comment: Occasional    Drug use: No    Sexual activity: Yes     Partners: Female     Birth control/protection: Partner-Vasectomy   Social History Narrative    Exercising intermittently     From [place], living in [place] with family       MEDICATIONS:     Current Outpatient Medications:     buPROPion (WELLBUTRIN XL) 300 MG 24 hr tablet, Take 1 tablet (300 mg total) by mouth once daily., Disp: 90 tablet, Rfl: 3    meloxicam (MOBIC) 15 MG tablet, Take 1 tablet (15 mg total) by mouth once daily., Disp: 30 tablet, Rfl: 0    methylPREDNISolone (MEDROL DOSEPACK) 4 mg tablet, use as directed (Patient not taking: Reported on 2023), Disp: 21 each, Rfl: 0    ALLERGIES:   Review of patient's allergies indicates:  No Known Allergies     PHYSICAL EXAMINATION:  /85   Pulse 77   Ht 6' 2" (1.88 m)   Wt 109.8 kg (242 lb)   BMI 31.07 kg/m²   Vitals signs and nursing note have been reviewed.    General: In no acute distress, well developed, well nourished, no diaphoresis  Eyes: EOM full and smooth, no eye redness or discharge  HEENT: normocephalic and atraumatic, neck supple, trachea midline, no nasal discharge  Cardiovascular: no LE edema  Lungs: respirations non-labored, no conversational dyspnea   Neuro: AAOx3, CN2-12 grossly intact  Skin: No rashes, warm and dry  Psychiatric: cooperative, pleasant, mood and affect appropriate for age    IMAGIN. MRI ordered due to left shoulder pain, taken on 23.  2. MRI images were reviewed personally by me and then directly with patient.  3. FINDINGS: ROTATOR CUFF:   "   Supraspinatus: Intact.  No tendinosis.     Infraspinatus: Intact.  No tendinosis.     Subscapularis: Intact.  No tendinosis.     Teres Minor: Intact.  No tendinosis.     There is no significant fluid within the subacromial/subdeltoid bursa.     LABRUM: Mild posterior decentering of the humeral head relative the glenoid with labral capsular disruption posteriorly.     Capsular redundancy anteriorly     LONG HEAD BICEPS TENDON: Located within bicipital groove and intact.Biceps-labral anchor is intact. No tendinosis.  No tenosynovitis. Rotator Interval is normal. Biceps pulley is intact.     IGHL: Intact     BONES: No evident fracture.Visualized marrow within normal limits. AC joint demonstrates normal alignment with moderate hypertrophy.No significant osteo-acromial outlet narrowing (with mass effect on rotator cuff myotendinous junction) due to lateral downsloping of acromionoracromial spur.  There is no evident os acromiale.     CARTILAGE: Preserved without focal defects or subchondral marrow edema.No synovial abnormality or intra-articular loose bodies. Glenoid fossa demonstrates no sclerosis.     MUSCLES:  Normal bulk and signal.     Calcification at the supraspinatus insertion most consistent with calcific tendinosis/calcium hydroxyapatite depositional disease (HADD). This calcification corresponds with plain film examination of 01/05/2023, measuring approximately 8 x 12 mm with trace amount of edema surrounding may represent the inflammatory phrase of calcium hydroxyapatite deposition disease.    4. IMPRESSION: Calcification at the supraspinatus insertion most consistent with calcific tendinosis/calcium hydroxyapatite depositional disease (HADD). Adjacent edema may represent the acute inflammatory phase of calcium hydroxyapatite deposition disease/calcific tendinitis.     No evidence for rotator cuff tear.     Incidental posterior decentering of the humeral head relative the glenoid with posterior labral  capsular irregularity, likely chronic.    IMAGIN. Shoulder X-ray ordered due to left shoulder pain. 3 views taken 23.   2. X-ray images were reviewed personally by me and then directly with patient.  3. FINDINGS: No acute fracture or dislocation seen.  Ossific densities adjacent to the greater tuberosity are nonspecific and can be seen with calcific tendinitis.     No significant soft tissue edema or radiopaque retained foreign body.    4. IMPRESSION:  No acute osseous abnormalities appreciated.    ASSESSMENT:      ICD-10-CM ICD-9-CM   1. Acute pain of left shoulder  M25.512 719.41   2. Calcific tendinitis of left shoulder  M75.32 726.11         PLAN:  Patient doing well following course of steroids and Mobic.  He is also doing well and physical therapy.  At this time we will not be moved forward with the TenJet procedure.  It was explained to patient that the patient's symptoms become more recurrent and bothersome we can look into doing the procedure.    Future planning includes - continue physical therapy    All questions were answered to the best of my ability and all concerns were addressed at this time.    This note is dictated using the M*Modal Fluency Direct word recognition program. There are word recognition mistakes that are occasionally missed on review.

## 2023-02-08 ENCOUNTER — PATIENT MESSAGE (OUTPATIENT)
Dept: REHABILITATION | Facility: OTHER | Age: 48
End: 2023-02-08

## 2023-04-19 ENCOUNTER — CLINICAL SUPPORT (OUTPATIENT)
Dept: ENDOSCOPY | Facility: HOSPITAL | Age: 48
End: 2023-04-19
Attending: INTERNAL MEDICINE
Payer: COMMERCIAL

## 2023-04-19 VITALS — HEIGHT: 74 IN | BODY MASS INDEX: 31.06 KG/M2 | WEIGHT: 242 LBS

## 2023-04-19 DIAGNOSIS — F32.A DEPRESSION, UNSPECIFIED DEPRESSION TYPE: ICD-10-CM

## 2023-04-19 DIAGNOSIS — Z12.11 SCREENING FOR COLON CANCER: ICD-10-CM

## 2023-04-19 RX ORDER — BUPROPION HYDROCHLORIDE 300 MG/1
300 TABLET ORAL DAILY
Qty: 90 TABLET | Refills: 3 | Status: SHIPPED | OUTPATIENT
Start: 2023-04-19

## 2023-04-19 NOTE — TELEPHONE ENCOUNTER
Requested medication has been pended and routed to Dr. Corey   for approval.    Refill Encounter    PCP Visits: Recent Visits  Date Type Provider Dept   01/05/23 Office Visit Zoie Corey MD [facility]   Showing recent visits within past 360 days and meeting all other requirements  Future Appointments  No visits were found meeting these conditions.  Showing future appointments within next 720 days and meeting all other requirements     Last 3 Blood Pressure:   BP Readings from Last 3 Encounters:   01/26/23 126/85   01/12/23 (!) 132/95   01/05/23 (!) 122/90     Preferred Pharmacy:   [facility]/pharmacy #[id number] - [facility] [address]  [address] [facility] [address]  Phone: [phone] Fax: [phone]    Requested RX:  Requested Prescriptions     Pending Prescriptions Disp Refills    buPROPion (WELLBUTRIN XL) 300 MG 24 hr tablet 90 tablet 3     Sig: Take 1 tablet (300 mg total) by mouth once daily.      RX Route: Normal

## 2023-04-19 NOTE — TELEPHONE ENCOUNTER
No new care gaps identified.  [facility] Embedded Care Gaps. Reference number: [id number]. 4/19/2023   9:29:46 AM LORETTAT

## 2023-04-20 ENCOUNTER — PATIENT MESSAGE (OUTPATIENT)
Dept: ENDOSCOPY | Facility: HOSPITAL | Age: 48
End: 2023-04-20
Payer: COMMERCIAL

## 2023-04-21 ENCOUNTER — PATIENT MESSAGE (OUTPATIENT)
Dept: ENDOSCOPY | Facility: HOSPITAL | Age: 48
End: 2023-04-21
Payer: COMMERCIAL

## 2023-04-21 DIAGNOSIS — Z12.11 COLON CANCER SCREENING: Primary | ICD-10-CM

## 2023-04-21 RX ORDER — SOD SULF/POT CHLORIDE/MAG SULF 1.479 G
12 TABLET ORAL DAILY
Qty: 24 TABLET | Refills: 0 | Status: ON HOLD | OUTPATIENT
Start: 2023-04-21 | End: 2023-05-23 | Stop reason: HOSPADM

## 2023-05-19 ENCOUNTER — PATIENT MESSAGE (OUTPATIENT)
Dept: REHABILITATION | Facility: OTHER | Age: 48
End: 2023-05-19
Payer: COMMERCIAL

## 2023-05-22 ENCOUNTER — ANESTHESIA EVENT (OUTPATIENT)
Dept: ENDOSCOPY | Facility: HOSPITAL | Age: 48
End: 2023-05-22
Payer: COMMERCIAL

## 2023-05-23 ENCOUNTER — HOSPITAL ENCOUNTER (OUTPATIENT)
Facility: HOSPITAL | Age: 48
Discharge: HOME | End: 2023-05-23
Attending: INTERNAL MEDICINE | Admitting: INTERNAL MEDICINE
Payer: COMMERCIAL

## 2023-05-23 ENCOUNTER — ANESTHESIA (OUTPATIENT)
Dept: ENDOSCOPY | Facility: HOSPITAL | Age: 48
End: 2023-05-23
Payer: COMMERCIAL

## 2023-05-23 VITALS
DIASTOLIC BLOOD PRESSURE: 74 MMHG | BODY MASS INDEX: 31.06 KG/M2 | OXYGEN SATURATION: 97 % | SYSTOLIC BLOOD PRESSURE: 107 MMHG | RESPIRATION RATE: 16 BRPM | TEMPERATURE: 98 F | HEART RATE: 67 BPM | WEIGHT: 242 LBS | HEIGHT: 74 IN

## 2023-05-23 DIAGNOSIS — Z12.11 COLON CANCER SCREENING: Primary | ICD-10-CM

## 2023-05-23 DIAGNOSIS — Z12.11 SCREEN FOR COLON CANCER: ICD-10-CM

## 2023-05-23 PROCEDURE — 37000009 HC ANESTHESIA EA ADD 15 MINS: Performed by: INTERNAL MEDICINE

## 2023-05-23 PROCEDURE — 88305 TISSUE EXAM BY PATHOLOGIST: ICD-10-PCS | Mod: 26,,, | Performed by: PATHOLOGY

## 2023-05-23 PROCEDURE — 45385 COLONOSCOPY W/LESION REMOVAL: CPT | Mod: 33,,, | Performed by: INTERNAL MEDICINE

## 2023-05-23 PROCEDURE — 27201089 HC SNARE, DISP (ANY): Performed by: INTERNAL MEDICINE

## 2023-05-23 PROCEDURE — 88305 TISSUE EXAM BY PATHOLOGIST: CPT | Performed by: PATHOLOGY

## 2023-05-23 PROCEDURE — D9220A PRA ANESTHESIA: Mod: 33,,, | Performed by: NURSE ANESTHETIST, CERTIFIED REGISTERED

## 2023-05-23 PROCEDURE — D9220A PRA ANESTHESIA: ICD-10-PCS | Mod: 33,,, | Performed by: NURSE ANESTHETIST, CERTIFIED REGISTERED

## 2023-05-23 PROCEDURE — 45385 PR COLONOSCOPY,REMV LESN,SNARE: ICD-10-PCS | Mod: 33,,, | Performed by: INTERNAL MEDICINE

## 2023-05-23 PROCEDURE — 88305 TISSUE EXAM BY PATHOLOGIST: CPT | Mod: 26,,, | Performed by: PATHOLOGY

## 2023-05-23 PROCEDURE — 94761 N-INVAS EAR/PLS OXIMETRY MLT: CPT

## 2023-05-23 PROCEDURE — 37000008 HC ANESTHESIA 1ST 15 MINUTES: Performed by: INTERNAL MEDICINE

## 2023-05-23 PROCEDURE — 25000003 PHARM REV CODE 250: Performed by: NURSE ANESTHETIST, CERTIFIED REGISTERED

## 2023-05-23 PROCEDURE — 45385 COLONOSCOPY W/LESION REMOVAL: CPT | Mod: PT | Performed by: INTERNAL MEDICINE

## 2023-05-23 PROCEDURE — 99900035 HC TECH TIME PER 15 MIN (STAT)

## 2023-05-23 PROCEDURE — 63600175 PHARM REV CODE 636 W HCPCS: Performed by: NURSE ANESTHETIST, CERTIFIED REGISTERED

## 2023-05-23 RX ORDER — PROPOFOL 10 MG/ML
VIAL (ML) INTRAVENOUS
Status: DISCONTINUED | OUTPATIENT
Start: 2023-05-23 | End: 2023-05-23

## 2023-05-23 RX ORDER — LIDOCAINE HYDROCHLORIDE 20 MG/ML
INJECTION INTRAVENOUS
Status: DISCONTINUED | OUTPATIENT
Start: 2023-05-23 | End: 2023-05-23

## 2023-05-23 RX ORDER — SODIUM CHLORIDE 9 MG/ML
INJECTION, SOLUTION INTRAVENOUS CONTINUOUS
Status: DISCONTINUED | OUTPATIENT
Start: 2023-05-23 | End: 2023-05-23 | Stop reason: HOSPADM

## 2023-05-23 RX ORDER — PROPOFOL 10 MG/ML
VIAL (ML) INTRAVENOUS CONTINUOUS PRN
Status: DISCONTINUED | OUTPATIENT
Start: 2023-05-23 | End: 2023-05-23

## 2023-05-23 RX ADMIN — PROPOFOL 90 MG: 10 INJECTION, EMULSION INTRAVENOUS at 08:05

## 2023-05-23 RX ADMIN — LIDOCAINE HYDROCHLORIDE 60 MG: 20 INJECTION INTRAVENOUS at 08:05

## 2023-05-23 RX ADMIN — Medication 200 MCG/KG/MIN: at 08:05

## 2023-05-23 RX ADMIN — SODIUM CHLORIDE: 0.9 INJECTION, SOLUTION INTRAVENOUS at 08:05

## 2023-05-23 NOTE — TRANSFER OF CARE
"Anesthesia Transfer of Care Note    Patient: Ryder Mendoza    Procedure(s) Performed: Procedure(s) (LRB):  COLONOSCOPY (N/A)    Patient location: PACU    Anesthesia Type: general    Transport from OR: Transported from OR on 6-10 L/min O2 by face mask with adequate spontaneous ventilation    Post pain: adequate analgesia    Post assessment: no apparent anesthetic complications and tolerated procedure well    Post vital signs: stable    Level of consciousness: responds to stimulation and sedated    Nausea/Vomiting: no nausea/vomiting    Complications: none    Transfer of care protocol was followed      Last vitals:   Visit Vitals  /76 (BP Location: Left arm, Patient Position: Lying)   Pulse 71   Temp 36.7 °C (98.1 °F) (Temporal)   Resp 18   Ht 6' 2" (1.88 m)   Wt 109.8 kg (242 lb)   SpO2 96%   BMI 31.07 kg/m²     "

## 2023-05-23 NOTE — PLAN OF CARE
Chart reviewed. Preop nursing care completed per orders. Safe surgery checklist complete. Pt denies any open wounds cuts or sores.Pt denies any metal in body.Belongings in locker 8. Pt AAOX3, VSS on room air. Pt toileted, Bed locked in lowest position, Call light within reach. Pt denies any needs at this time. Will continue to monitor.

## 2023-05-23 NOTE — H&P
Short Stay Endoscopy History and Physical    PCP - Zoie Corey MD    Procedure - Colonoscopy  Sedation: GA  ASA - per anesthesia  Mallampati - per anesthesia  History of Anesthesia problems - no  Family history Anesthesia problems -  no     HPI:  This is a 48 y.o. male here for evaluation of : Screening for CRC    Reflux - no  Dysphagia - no  Abdominal pain - no  Diarrhea - no    ROS:  Constitutional: No fevers, chills, No weight loss  ENT: No allergies  CV: No chest pain  Pulm: No cough, No shortness of breath  Ophtho: No vision changes  GI: see HPI  Medical History:  has a past medical history of Cataract, Diarrhea (2015), Lattice degeneration, both eyes, MRSA (methicillin resistant staph aureus) culture positive, and Stickler's syndrome.    Surgical History:  has a past surgical history that includes Appendectomy; Abdominal surgery; Cataract extraction w/  intraocular lens implant (Left, 06/27/2016); Cataract extraction w/  intraocular lens implant (Right, 07/18/2016); Eye surgery (June 2016); Hernia repair (January 2017); and Vasectomy (November 2019).    Family History: family history includes COPD in his mother; Cataracts in his father and mother; Crohn's disease in his mother; No Known Problems in his daughter, daughter, sister, and sister; Retinal detachment in his brother, father, and sister; Scoliosis in his sister and sister.. Otherwise no colon cancer, inflammatory bowel disease, or GI malignancies.    Social History:  reports that he has never smoked. He has never used smokeless tobacco. He reports current alcohol use of about 10.0 standard drinks per week. He reports that he does not use drugs.    Review of patient's allergies indicates:  No Known Allergies    Medications:   Medications Prior to Admission   Medication Sig Dispense Refill Last Dose    buPROPion (WELLBUTRIN XL) 300 MG 24 hr tablet Take 1 tablet (300 mg total) by mouth once daily. 90 tablet 3     meloxicam (MOBIC) 15 MG tablet  Take 1 tablet (15 mg total) by mouth once daily. 30 tablet 0     methylPREDNISolone (MEDROL DOSEPACK) 4 mg tablet use as directed (Patient not taking: Reported on 1/26/2023) 21 each 0     sod sulf-pot chloride-mag sulf (SUTAB) 1.479-0.188- 0.225 gram tablet Take 12 tablets by mouth once daily. BIN  [id number] [facility]  Group  [id number]  Member ID  [id number] 24 tablet 0        Objective Findings:    Vital Signs: Per nursing notes.    Physical Exam:  General Appearance: Well appearing in no acute distress  Head:   Normocephalic, without obvious abnormality  Eyes:    No scleral icterus  Airway: Open  Neck: No restriction in mobility  Lungs: CTA bilaterally in anterior and posterior fields, no wheezes, no crackles.  Heart:  Regular rate and rhythm, S1, S2 normal, no murmurs heard  Abdomen: Soft, non tender, non distended      Labs:  Lab Results   Component Value Date    WBC 5.32 09/11/2019    HGB 14.7 09/11/2019    HCT 44.2 09/11/2019     09/11/2019    CHOL 224 (H) 11/19/2020    TRIG 87 11/19/2020    HDL 56 11/19/2020    ALT 23 11/19/2020    AST 22 11/19/2020     11/19/2020    K 4.3 11/19/2020     11/19/2020    CREATININE 1.0 11/19/2020    BUN 16 11/19/2020    CO2 28 11/19/2020    TSH 2.685 09/11/2019         I have explained the risks and benefits of endoscopy procedures to the patient including but not limited to bleeding, perforation, infection, and death.    Thank you so much for allowing me to participate in the care of Ryder Robles MD

## 2023-05-23 NOTE — PROVATION PATIENT INSTRUCTIONS
Discharge Summary/Instructions after an Endoscopic Procedure  Patient Name: Ryder Mendoza  Patient MRN: [id number]  Patient YOB: 1975  Tuesday, May 23, 2023  Rashaad Robles MD  Dear patient,  As a result of recent federal legislation (The Federal Cures Act), you may   receive lab or pathology results from your procedure in your [facility]   account before your physician is able to contact you. Your physician or   their representative will relay the results to you with their   recommendations at their soonest availability.  Thank you,  RESTRICTIONS:  During your procedure today, you received medications for sedation.  These   medications may affect your judgment, balance and coordination.  Therefore,   for 24 hours, you have the following restrictions:   - DO NOT drive a car, operate machinery, make legal/financial decisions,   sign important papers or drink alcohol.    ACTIVITY:  Today: no heavy lifting, straining or running due to procedural   sedation/anesthesia.  The following day: return to full activity including work.  DIET:  Eat and drink normally unless instructed otherwise.     TREATMENT FOR COMMON SIDE EFFECTS:  - Mild abdominal pain, nausea, belching, bloating or excessive gas:  rest,   eat lightly and use a heating pad.  - Sore Throat: treat with throat lozenges and/or gargle with warm salt   water.  - Because air was used during the procedure, expelling large amounts of air   from your rectum or belching is normal.  - If a bowel prep was taken, you may not have a bowel movement for 1-3 days.    This is normal.  SYMPTOMS TO WATCH FOR AND REPORT TO YOUR PHYSICIAN:  1. Abdominal pain or bloating, other than gas cramps.  2. Chest pain.  3. Back pain.  4. Signs of infection such as: chills or fever occurring within 24 hours   after the procedure.  5. Rectal bleeding, which would show as bright red, maroon, or black stools.   (A tablespoon of blood from the rectum is not serious, especially if    hemorrhoids are present.)  6. Vomiting.  7. Weakness or dizziness.  GO DIRECTLY TO THE NEAREST EMERGENCY ROOM IF YOU HAVE ANY OF THE FOLLOWING:      Difficulty breathing              Chills and/or fever over 101 F   Persistent vomiting and/or vomiting blood   Severe abdominal pain   Severe chest pain   Black, tarry stools   Bleeding- more than one tablespoon   Any other symptom or condition that you feel may need urgent attention  Your doctor recommends these additional instructions:  If any biopsies were taken, your doctors clinic will contact you in 1 to 2   weeks with any results.  - Patient has a contact number available for emergencies.  The signs and   symptoms of potential delayed complications were discussed with the   patient.  Return to normal activities tomorrow.  Written discharge   instructions were provided to the patient.   - Discharge patient to home.   - Resume previous diet.   - Continue present medications.   - Await pathology results.   - Repeat colonoscopy in 10 years for surveillance.   For questions, problems or results please call your physician - Rashaad Robles MD at Work:  [phone].  [facility] [place], EMERGENCY ROOM PHONE NUMBER: [phone]  IF A COMPLICATION OR EMERGENCY SITUATION ARISES AND YOU ARE UNABLE TO REACH   YOUR PHYSICIAN - GO DIRECTLY TO THE EMERGENCY ROOM.  Rashaad Robles MD  5/23/2023 9:20:16 AM  This report has been verified and signed electronically.  Dear patient,  As a result of recent federal legislation (The Federal Cures Act), you may   receive lab or pathology results from your procedure in your [facility]   account before your physician is able to contact you. Your physician or   their representative will relay the results to you with their   recommendations at their soonest availability.  Thank you,  PROVATION

## 2023-05-29 ENCOUNTER — TELEPHONE (OUTPATIENT)
Dept: GASTROENTEROLOGY | Facility: CLINIC | Age: 48
End: 2023-05-29
Payer: COMMERCIAL

## 2023-05-29 LAB
FINAL PATHOLOGIC DIAGNOSIS: NORMAL
GROSS: NORMAL
Lab: NORMAL

## 2023-05-29 NOTE — TELEPHONE ENCOUNTER
----- Message from Rashaad Robles MD sent at 5/29/2023  2:08 PM CDT -----  Colon polyp was benign. Repeat in 10 years instead of 5 years.

## 2023-06-12 ENCOUNTER — PATIENT MESSAGE (OUTPATIENT)
Dept: INTERNAL MEDICINE | Facility: CLINIC | Age: 48
End: 2023-06-12
Payer: COMMERCIAL

## 2023-10-10 ENCOUNTER — PATIENT MESSAGE (OUTPATIENT)
Dept: PODIATRY | Facility: CLINIC | Age: 48
End: 2023-10-10

## 2023-10-10 ENCOUNTER — OFFICE VISIT (OUTPATIENT)
Dept: PODIATRY | Facility: CLINIC | Age: 48
End: 2023-10-10
Payer: COMMERCIAL

## 2023-10-10 VITALS
WEIGHT: 242 LBS | SYSTOLIC BLOOD PRESSURE: 108 MMHG | HEART RATE: 66 BPM | DIASTOLIC BLOOD PRESSURE: 73 MMHG | BODY MASS INDEX: 31.06 KG/M2 | HEIGHT: 74 IN

## 2023-10-10 DIAGNOSIS — M24.573 EQUINUS CONTRACTURE OF ANKLE: ICD-10-CM

## 2023-10-10 DIAGNOSIS — M72.2 PLANTAR FASCIITIS: Primary | ICD-10-CM

## 2023-10-10 DIAGNOSIS — M79.672 FOOT PAIN, LEFT: ICD-10-CM

## 2023-10-10 PROCEDURE — 99999 PR PBB SHADOW E&M-EST. PATIENT-LVL III: ICD-10-PCS | Mod: PBBFAC,,, | Performed by: PODIATRIST

## 2023-10-10 PROCEDURE — 29540 PR STRAPPING; ANKLE &/OR FOOT: ICD-10-PCS | Mod: LT,S$GLB,, | Performed by: PODIATRIST

## 2023-10-10 PROCEDURE — 3008F PR BODY MASS INDEX (BMI) DOCUMENTED: ICD-10-PCS | Mod: CPTII,S$GLB,, | Performed by: PODIATRIST

## 2023-10-10 PROCEDURE — 29540 STRAPPING ANKLE &/FOOT: CPT | Mod: LT,S$GLB,, | Performed by: PODIATRIST

## 2023-10-10 PROCEDURE — 3074F SYST BP LT 130 MM HG: CPT | Mod: CPTII,S$GLB,, | Performed by: PODIATRIST

## 2023-10-10 PROCEDURE — 99204 OFFICE O/P NEW MOD 45 MIN: CPT | Mod: 25,S$GLB,, | Performed by: PODIATRIST

## 2023-10-10 PROCEDURE — 3078F PR MOST RECENT DIASTOLIC BLOOD PRESSURE < 80 MM HG: ICD-10-PCS | Mod: CPTII,S$GLB,, | Performed by: PODIATRIST

## 2023-10-10 PROCEDURE — 3078F DIAST BP <80 MM HG: CPT | Mod: CPTII,S$GLB,, | Performed by: PODIATRIST

## 2023-10-10 PROCEDURE — 99999 PR PBB SHADOW E&M-EST. PATIENT-LVL III: CPT | Mod: PBBFAC,,, | Performed by: PODIATRIST

## 2023-10-10 PROCEDURE — 99204 PR OFFICE/OUTPT VISIT, NEW, LEVL IV, 45-59 MIN: ICD-10-PCS | Mod: 25,S$GLB,, | Performed by: PODIATRIST

## 2023-10-10 PROCEDURE — 3008F BODY MASS INDEX DOCD: CPT | Mod: CPTII,S$GLB,, | Performed by: PODIATRIST

## 2023-10-10 PROCEDURE — 3074F PR MOST RECENT SYSTOLIC BLOOD PRESSURE < 130 MM HG: ICD-10-PCS | Mod: CPTII,S$GLB,, | Performed by: PODIATRIST

## 2023-10-10 PROCEDURE — 1159F MED LIST DOCD IN RCRD: CPT | Mod: CPTII,S$GLB,, | Performed by: PODIATRIST

## 2023-10-10 PROCEDURE — 1159F PR MEDICATION LIST DOCUMENTED IN MEDICAL RECORD: ICD-10-PCS | Mod: CPTII,S$GLB,, | Performed by: PODIATRIST

## 2023-10-10 RX ORDER — LIDOCAINE AND PRILOCAINE 25; 25 MG/G; MG/G
CREAM TOPICAL
Qty: 30 G | Refills: 3 | Status: SHIPPED | OUTPATIENT
Start: 2023-10-10

## 2023-10-10 NOTE — PROGRESS NOTES
Subjective:      Patient ID: Ryder Mendoza is a 48 y.o. male.    Chief Complaint: Heel Pain (L foot)    Sharp deep pain in the bottom of the left foot.  So chronic condition present for months good and bad days.  This exacerbation is  Gradual onset, worsening over the past few weeks.  Aggravated by increased weight-bearing prolonged standing some shoes-particular after a period of rest.  No prior medical treatment.  Self-treatment with stretches and ice have not produced to response.  Denies trauma and surgery both feet    Review of Systems   Constitutional: Negative for chills, diaphoresis, fever, malaise/fatigue and night sweats.   Cardiovascular:  Negative for claudication, cyanosis, leg swelling and syncope.   Skin:  Negative for color change, dry skin, nail changes, rash, suspicious lesions and unusual hair distribution.   Musculoskeletal:  Negative for falls, joint pain, joint swelling, muscle cramps, muscle weakness and stiffness.   Gastrointestinal:  Negative for constipation, diarrhea, nausea and vomiting.   Neurological:  Negative for brief paralysis, disturbances in coordination, focal weakness, numbness, paresthesias, sensory change and tremors.         Objective:      Physical Exam  Constitutional:       General: He is not in acute distress.     Appearance: He is well-developed. He is not diaphoretic.   Cardiovascular:      Pulses:           Popliteal pulses are 2+ on the right side and 2+ on the left side.        Dorsalis pedis pulses are 2+ on the right side and 2+ on the left side.        Posterior tibial pulses are 2+ on the right side and 2+ on the left side.      Comments: Capillary refill 3 seconds all toes/distal feet, all toes/both feet warm to touch.      Negative lymphadenopathy bilateral popliteal fossa and tarsal tunnel.      Negavie lower extremity edema bilateral.    Musculoskeletal:      Right ankle: No swelling, deformity, ecchymosis or lacerations. Normal range of motion. Normal  pulse.      Right Achilles Tendon: Normal. No defects. Sands's test negative.      Comments: Sharp deep pain to palpation inferior heel left at medial calcaneal tubercle without ecchymosis, erythema, edema, or cardinal signs infection, and no signs of trauma.    Ankle dorsiflexion decreased at <10 degrees bilateral with moderate increase with knee flexion bilateral.    Otherwise ,Normal angle, base, station of gait. All ten toes without clubbing, cyanosis, or signs of ischemia.  No pain to palpation bilateral lower extremities.  Range of motion, stability, muscle strength, and muscle tone normal bilateral feet and legs.    Lymphadenopathy:      Lower Body: No right inguinal adenopathy. No left inguinal adenopathy.      Comments: Negative lymphadenopathy bilateral popliteal fossa and tarsal tunnel.    Negative lymphangitic streaking bilateral feet/ankles/legs.   Skin:     General: Skin is warm and dry.      Capillary Refill: Capillary refill takes 2 to 3 seconds.      Coloration: Skin is not pale.      Findings: No abrasion, bruising, burn, ecchymosis, erythema, laceration, lesion or rash.      Nails: There is no clubbing.      Comments: Skin is normal age and health appropriate color, turgor, texture, and temperature bilateral lower extremities without ulceration, hyperpigmentation, discoloration, masses nodules or cords palpated.  No ecchymosis, erythema, edema, or cardinal signs of infection bilateral lower extremities.       Neurological:      Mental Status: He is alert and oriented to person, place, and time.      Sensory: No sensory deficit.      Motor: No tremor, atrophy or abnormal muscle tone.      Gait: Gait normal.      Deep Tendon Reflexes:      Reflex Scores:       Patellar reflexes are 2+ on the right side and 2+ on the left side.       Achilles reflexes are 2+ on the right side and 2+ on the left side.     Comments: Negative tinel sign to percussion sural, superficial peroneal, deep peroneal,  saphenous, and posterior tibial nerves right and left ankles and feet.     Psychiatric:         Behavior: Behavior is cooperative.           Assessment:       Encounter Diagnoses   Name Primary?    Plantar fasciitis Yes    Foot pain, left     Equinus contracture of ankle          Plan:       Ryder was seen today for heel pain.    Diagnoses and all orders for this visit:    Plantar fasciitis    Foot pain, left    Equinus contracture of ankle    Other orders  -     LIDOcaine-prilocaine (EMLA) cream; Apply topically as needed.      I counseled the patient on his conditions, their implications and medical management.        Patient will stretch the tendo achilles complex three times daily as demonstrated in the office.  Literature was dispensed illustrating proper stretching technique.    I applied a plantar rest strapping to the patient's left foot to offload symptomatic area, support the arch, and relieve pain.    Patient will obtain over the counter arch supports and wear them in shoes whenever possible.  Athletic shoes intended for walking or running are usually best.    The patient was advised that NSAID-type medications have two very important potential side effects: gastrointestinal irritation including hemorrhage and renal injuries. He was asked to take the medication with food and to stop if he experiences any GI upset. I asked him to call for vomiting, abdominal pain or black/bloody stools. The patient expresses understanding of these issues and questions were answered.    Discussed conservative treatment with shoes of adequate dimensions, material, and style to alleviate symptoms and delay or prevent surgical intervention.    Emla topically once nightly left heel for pain.          Follow up in about 1 month (around 11/10/2023).

## 2023-11-10 ENCOUNTER — OFFICE VISIT (OUTPATIENT)
Dept: PODIATRY | Facility: CLINIC | Age: 48
End: 2023-11-10
Payer: COMMERCIAL

## 2023-11-10 VITALS
BODY MASS INDEX: 31.06 KG/M2 | SYSTOLIC BLOOD PRESSURE: 118 MMHG | HEART RATE: 83 BPM | WEIGHT: 242 LBS | DIASTOLIC BLOOD PRESSURE: 76 MMHG | HEIGHT: 74 IN

## 2023-11-10 DIAGNOSIS — M79.672 FOOT PAIN, LEFT: ICD-10-CM

## 2023-11-10 DIAGNOSIS — M72.2 PLANTAR FASCIITIS: Primary | ICD-10-CM

## 2023-11-10 DIAGNOSIS — M24.573 EQUINUS CONTRACTURE OF ANKLE: ICD-10-CM

## 2023-11-10 PROCEDURE — 1159F MED LIST DOCD IN RCRD: CPT | Mod: CPTII,S$GLB,, | Performed by: PODIATRIST

## 2023-11-10 PROCEDURE — 99213 PR OFFICE/OUTPT VISIT, EST, LEVL III, 20-29 MIN: ICD-10-PCS | Mod: S$GLB,,, | Performed by: PODIATRIST

## 2023-11-10 PROCEDURE — 3008F BODY MASS INDEX DOCD: CPT | Mod: CPTII,S$GLB,, | Performed by: PODIATRIST

## 2023-11-10 PROCEDURE — 3074F SYST BP LT 130 MM HG: CPT | Mod: CPTII,S$GLB,, | Performed by: PODIATRIST

## 2023-11-10 PROCEDURE — 3074F PR MOST RECENT SYSTOLIC BLOOD PRESSURE < 130 MM HG: ICD-10-PCS | Mod: CPTII,S$GLB,, | Performed by: PODIATRIST

## 2023-11-10 PROCEDURE — 1160F RVW MEDS BY RX/DR IN RCRD: CPT | Mod: CPTII,S$GLB,, | Performed by: PODIATRIST

## 2023-11-10 PROCEDURE — 3008F PR BODY MASS INDEX (BMI) DOCUMENTED: ICD-10-PCS | Mod: CPTII,S$GLB,, | Performed by: PODIATRIST

## 2023-11-10 PROCEDURE — 99999 PR PBB SHADOW E&M-EST. PATIENT-LVL III: ICD-10-PCS | Mod: PBBFAC,,, | Performed by: PODIATRIST

## 2023-11-10 PROCEDURE — 3078F PR MOST RECENT DIASTOLIC BLOOD PRESSURE < 80 MM HG: ICD-10-PCS | Mod: CPTII,S$GLB,, | Performed by: PODIATRIST

## 2023-11-10 PROCEDURE — 3078F DIAST BP <80 MM HG: CPT | Mod: CPTII,S$GLB,, | Performed by: PODIATRIST

## 2023-11-10 PROCEDURE — 99213 OFFICE O/P EST LOW 20 MIN: CPT | Mod: S$GLB,,, | Performed by: PODIATRIST

## 2023-11-10 PROCEDURE — 99999 PR PBB SHADOW E&M-EST. PATIENT-LVL III: CPT | Mod: PBBFAC,,, | Performed by: PODIATRIST

## 2023-11-10 PROCEDURE — 1159F PR MEDICATION LIST DOCUMENTED IN MEDICAL RECORD: ICD-10-PCS | Mod: CPTII,S$GLB,, | Performed by: PODIATRIST

## 2023-11-10 PROCEDURE — 1160F PR REVIEW ALL MEDS BY PRESCRIBER/CLIN PHARMACIST DOCUMENTED: ICD-10-PCS | Mod: CPTII,S$GLB,, | Performed by: PODIATRIST

## 2023-11-10 NOTE — PROGRESS NOTES
Subjective:      Patient ID: Ryder Mendoza is a 48 y.o. male.    Chief Complaint: Follow-up (Heel pain)    Sharp deep pain in the bottom of the left foot.  So chronic condition present for months good and bad days.  This exacerbation is  Gradual onset, slowly improving over the past few weeks.  Aggravated by increased weight-bearing prolonged standing some shoes-particular after a period of rest.  Stretches inserts occasional NSAIDs do help the symptoms.  Overall he feels he is improved reasonably..  Denies trauma and surgery both feet    Review of Systems   Constitutional: Negative for chills, diaphoresis, fever, malaise/fatigue and night sweats.   Cardiovascular:  Negative for claudication, cyanosis, leg swelling and syncope.   Skin:  Negative for color change, dry skin, nail changes, rash, suspicious lesions and unusual hair distribution.   Musculoskeletal:  Negative for falls, joint pain, joint swelling, muscle cramps, muscle weakness and stiffness.   Gastrointestinal:  Negative for constipation, diarrhea, nausea and vomiting.   Neurological:  Negative for brief paralysis, disturbances in coordination, focal weakness, numbness, paresthesias, sensory change and tremors.           Objective:      Physical Exam  Constitutional:       General: He is not in acute distress.     Appearance: He is well-developed. He is not diaphoretic.   Cardiovascular:      Pulses:           Popliteal pulses are 2+ on the right side and 2+ on the left side.        Dorsalis pedis pulses are 2+ on the right side and 2+ on the left side.        Posterior tibial pulses are 2+ on the right side and 2+ on the left side.      Comments: Capillary refill 3 seconds all toes/distal feet, all toes/both feet warm to touch.      Negative lymphadenopathy bilateral popliteal fossa and tarsal tunnel.      Negavie lower extremity edema bilateral.    Musculoskeletal:      Right ankle: No swelling, deformity, ecchymosis or lacerations. Normal range of  motion. Normal pulse.      Right Achilles Tendon: Normal. No defects. Sands's test negative.      Comments: Some residual pain to palpation inferior heel left at medial calcaneal tubercle without ecchymosis, erythema, edema, or cardinal signs infection, and no signs of trauma.    Ankle dorsiflexion decreased at <10 degrees bilateral with moderate increase with knee flexion bilateral.    Otherwise ,Normal angle, base, station of gait. All ten toes without clubbing, cyanosis, or signs of ischemia.  No pain to palpation bilateral lower extremities.  Range of motion, stability, muscle strength, and muscle tone normal bilateral feet and legs.    Lymphadenopathy:      Lower Body: No right inguinal adenopathy. No left inguinal adenopathy.      Comments: Negative lymphadenopathy bilateral popliteal fossa and tarsal tunnel.    Negative lymphangitic streaking bilateral feet/ankles/legs.   Skin:     General: Skin is warm and dry.      Capillary Refill: Capillary refill takes 2 to 3 seconds.      Coloration: Skin is not pale.      Findings: No abrasion, bruising, burn, ecchymosis, erythema, laceration, lesion or rash.      Nails: There is no clubbing.      Comments: Skin is normal age and health appropriate color, turgor, texture, and temperature bilateral lower extremities without ulceration, hyperpigmentation, discoloration, masses nodules or cords palpated.  No ecchymosis, erythema, edema, or cardinal signs of infection bilateral lower extremities.       Neurological:      Mental Status: He is alert and oriented to person, place, and time.      Sensory: No sensory deficit.      Motor: No tremor, atrophy or abnormal muscle tone.      Gait: Gait normal.      Deep Tendon Reflexes:      Reflex Scores:       Patellar reflexes are 2+ on the right side and 2+ on the left side.       Achilles reflexes are 2+ on the right side and 2+ on the left side.     Comments: Negative tinel sign to percussion sural, superficial peroneal,  deep peroneal, saphenous, and posterior tibial nerves right and left ankles and feet.     Psychiatric:         Behavior: Behavior is cooperative.             Assessment:       Encounter Diagnoses   Name Primary?    Plantar fasciitis Yes    Foot pain, left     Equinus contracture of ankle          Plan:       Ryder was seen today for follow-up.    Diagnoses and all orders for this visit:    Plantar fasciitis  -     ORTHOTIC DEVICE (DME)  -     Ambulatory referral/consult to Physical/Occupational Therapy; Future    Foot pain, left  -     ORTHOTIC DEVICE (DME)  -     Ambulatory referral/consult to Physical/Occupational Therapy; Future    Equinus contracture of ankle  -     ORTHOTIC DEVICE (DME)  -     Ambulatory referral/consult to Physical/Occupational Therapy; Future      I counseled the patient on his conditions, their implications and medical management.        Continue stretches, inserts, athletic shoes, activity to tolerance, over-the-counter pain medication NSAIDs per label instructions as needed.      Prescribed physical therapy, custom orthotics and dispense night splint as tools so patient has choice and care.      Defer scheduled follow-up pending continued improvement/resolution.          No follow-ups on file.

## 2024-04-11 ENCOUNTER — OFFICE VISIT (OUTPATIENT)
Dept: PAIN MEDICINE | Facility: CLINIC | Age: 49
End: 2024-04-11
Attending: ANESTHESIOLOGY
Payer: COMMERCIAL

## 2024-04-11 VITALS
SYSTOLIC BLOOD PRESSURE: 125 MMHG | DIASTOLIC BLOOD PRESSURE: 82 MMHG | TEMPERATURE: 98 F | BODY MASS INDEX: 32.58 KG/M2 | WEIGHT: 253.75 LBS | RESPIRATION RATE: 18 BRPM | HEART RATE: 75 BPM | OXYGEN SATURATION: 98 %

## 2024-04-11 DIAGNOSIS — M54.2 MYOFASCIAL NECK PAIN: Primary | ICD-10-CM

## 2024-04-11 DIAGNOSIS — M47.819 SPONDYLOSIS WITHOUT MYELOPATHY OR RADICULOPATHY: ICD-10-CM

## 2024-04-11 PROCEDURE — 20553 NJX 1/MLT TRIGGER POINTS 3/>: CPT | Mod: S$GLB,,, | Performed by: ANESTHESIOLOGY

## 2024-04-11 PROCEDURE — 99999 PR PBB SHADOW E&M-EST. PATIENT-LVL IV: CPT | Mod: PBBFAC,,, | Performed by: ANESTHESIOLOGY

## 2024-04-11 PROCEDURE — 3079F DIAST BP 80-89 MM HG: CPT | Mod: CPTII,S$GLB,, | Performed by: ANESTHESIOLOGY

## 2024-04-11 PROCEDURE — 1159F MED LIST DOCD IN RCRD: CPT | Mod: CPTII,S$GLB,, | Performed by: ANESTHESIOLOGY

## 2024-04-11 PROCEDURE — 3008F BODY MASS INDEX DOCD: CPT | Mod: CPTII,S$GLB,, | Performed by: ANESTHESIOLOGY

## 2024-04-11 PROCEDURE — 1160F RVW MEDS BY RX/DR IN RCRD: CPT | Mod: CPTII,S$GLB,, | Performed by: ANESTHESIOLOGY

## 2024-04-11 PROCEDURE — 3074F SYST BP LT 130 MM HG: CPT | Mod: CPTII,S$GLB,, | Performed by: ANESTHESIOLOGY

## 2024-04-11 PROCEDURE — 99204 OFFICE O/P NEW MOD 45 MIN: CPT | Mod: 25,S$GLB,, | Performed by: ANESTHESIOLOGY

## 2024-04-11 RX ORDER — TIZANIDINE 4 MG/1
4 TABLET ORAL EVERY 6 HOURS PRN
Qty: 30 TABLET | Refills: 2 | Status: SHIPPED | OUTPATIENT
Start: 2024-04-11 | End: 2024-05-11

## 2024-04-11 RX ORDER — MELOXICAM 15 MG/1
15 TABLET ORAL DAILY
Qty: 30 TABLET | Refills: 2 | Status: SHIPPED | OUTPATIENT
Start: 2024-04-11 | End: 2024-06-12

## 2024-04-11 NOTE — PROGRESS NOTES
Chronic Pain - New Consult    Referring Physician: Afsaneh Sharma    Chief Complaint:   Chief Complaint   Patient presents with    Neck Pain    Shoulder Pain     Pain at 5        SUBJECTIVE:    Ryder Mendoza presents to the clinic for the evaluation of left sided neck pain. The pain started 1.5 weeks ago following no specific inciting event and symptoms have been improving.The pain is located in the cervical area and radiates to the left shoulder.  The pain is described as aching, sharp, shooting, stabbing, and throbbing and is rated as 7/10. The pain is rated with a score of  2/10 on the BEST day and a score of 10/10 on the WORST day.  Symptoms interfere with daily activity, sleeping, and work. The pain is exacerbated by Extension and cervical lateral bending.  The pain is mitigated by rest and NSAIDs. He denies any trauma. He has also been using diclofenac gel.     Patient denies night fever/night sweats, urinary incontinence, bowel incontinence, significant weight loss, significant motor weakness, and loss of sensations.    Physical Therapy/Home Exercise: no      Pain Disability Index Review:       No data to display                Pain Medications:    Ibuprofen  Voltaren gel     report:  Not applicable    Pain Procedures: None    Imaging: None     Past Medical History:   Diagnosis Date    Cataract     Diarrhea 2015    c-diff    Lattice degeneration, both eyes     MRSA (methicillin resistant staph aureus) culture positive     Stickler's syndrome      Past Surgical History:   Procedure Laterality Date    ABDOMINAL SURGERY      APPENDECTOMY      CATARACT EXTRACTION W/  INTRAOCULAR LENS IMPLANT Left 06/27/2016    Dr Parker     CATARACT EXTRACTION W/  INTRAOCULAR LENS IMPLANT Right 07/18/2016    Dr Parker     COLONOSCOPY N/A 5/23/2023    Procedure: COLONOSCOPY;  Surgeon: Rashaad Robles MD;  Location: [facility] ENDOSCOPY;  Service: Endoscopy;  Laterality: N/A;  pt request time, PEG prep, instructions  portal-  5/19 pre-call attempted; no answer left message;   5/22 Pre call attempted. No answer: SG    EYE SURGERY  June 2016    HERNIA REPAIR  January 2017    VASECTOMY  November 2019     Social History     Socioeconomic History    Marital status:    Occupational History    Occupation:    Tobacco Use    Smoking status: Never    Smokeless tobacco: Never   Substance and Sexual Activity    Alcohol use: Yes     Alcohol/week: 10.0 standard drinks of alcohol     Types: 8 Glasses of wine, 2 Standard drinks or equivalent per week     Comment: Occasional    Drug use: No    Sexual activity: Yes     Partners: Female     Birth control/protection: Partner-Vasectomy   Social History Narrative    Exercising intermittently     From [place], living in [place] with family     Family History   Problem Relation Age of Onset    Cataracts Mother     Crohn's disease Mother     COPD Mother     Retinal detachment Father     Cataracts Father     No Known Problems Sister     Retinal detachment Brother     No Known Problems Daughter     No Known Problems Sister     Retinal detachment Sister     Scoliosis Sister     Scoliosis Sister     No Known Problems Daughter        Review of patient's allergies indicates:  No Known Allergies    Current Outpatient Medications   Medication Sig    buPROPion (WELLBUTRIN XL) 300 MG 24 hr tablet Take 1 tablet (300 mg total) by mouth once daily.    LIDOcaine-prilocaine (EMLA) cream Apply topically as needed.    meloxicam (MOBIC) 15 MG tablet Take 1 tablet (15 mg total) by mouth once daily.    tiZANidine (ZANAFLEX) 4 MG tablet Take 1 tablet (4 mg total) by mouth every 6 (six) hours as needed.     No current facility-administered medications for this visit.       REVIEW OF SYSTEMS:    GENERAL:  No weight loss, malaise or fevers.  HEENT:  Negative for frequent or significant headaches.  NECK:  Negative for lumps, goiter, pain and significant neck swelling.  RESPIRATORY:  Negative for cough, wheezing or  shortness of breath.  CARDIOVASCULAR:  Negative for chest pain, leg swelling or palpitations.  GI:  Negative for abdominal discomfort, blood in stools or black stools or change in bowel habits.  MUSCULOSKELETAL:  See HPI.  SKIN:  Negative for lesions, rash, and itching.  PSYCH:  + for sleep disturbance, mood disorder and recent psychosocial stressors.  HEMATOLOGY/LYMPHOLOGY:  Negative for prolonged bleeding, bruising easily or swollen nodes.  NEURO:   No history of headaches, syncope, paralysis, seizures or tremors.  All other reviewed and negative other than HPI.    OBJECTIVE:    /82   Pulse 75   Temp 98.3 °F (36.8 °C)   Resp 18   Wt 115.1 kg (253 lb 12 oz)   SpO2 98%   BMI 32.58 kg/m²     PHYSICAL EXAMINATION:    General appearance: Well appearing, in no acute distress, alert and oriented x3.  Psych:  Mood and affect appropriate.  Skin: Skin color, texture, turgor normal, no rashes or lesions, in both upper and lower body.  Head/face:  Normocephalic, atraumatic. No palpable lymph nodes.  Neck: TTP over left cervical paraspinals and upper trapezius. Negative Spurling and facet loading. Limited ROM 2/2 pain. Pain with lateral rotation and bending to the left.   Cor: RRR  Pulm: CTA  GI:  Soft and non-tender.  Extremities: Peripheral joint ROM is full and pain free without obvious instability or laxity in all four extremities. No deformities, edema, or skin discoloration. Good capillary refill.  Musculoskeletal: Shoulder maneuvers were negative. Bilateral upper and lower extremity strength is normal and symmetric.  No atrophy or tone abnormalities are noted.  Neuro: Bilateral upper and lower extremity coordination and muscle stretch reflexes are physiologic and symmetric.  Plantar response are downgoing. No loss of sensation is noted.  Gait: normal.    ASSESSMENT: 49 y.o. year old male with acute cervical pain, consistent with:     1. Myofascial neck pain  X-Ray Cervical Spine 5 View With Flex And Ext       2. Spondylosis without myelopathy or radiculopathy              PLAN:   - I have stressed the importance of physical activity and a home exercise plan to help with pain and improve health.  - Patient can continue with medications for now since they are providing benefits, using them appropriately, and without side effects.  - Cervical spine Xray w/ Ex/Flex   - TPIs performed in clinic today. Please see procedure note below.   - Start Meloxicam 15 mg daily for one month.   - Start Tizanidine 4 mg nightly for one month.   - Consider PT if pain does not improve with more conservative measures as described above.   - RTC 6-8 weeks or PRN  - Counseled patient regarding the importance of activity modification, constant sleeping habits, and physical therapy.      The above plan and management options were discussed at length with patient. Patient is in agreement with the above and verbalized understanding. It will be communicated with the referring physician via electronic record, fax, or mail.    Tino Díaz   I have personally reviewed the history and exam of this patient and agree with the resident/fellow/NPs note as stated above.    Jose D Carrillo MD    04/11/2024    Patient Name: Ryder Mendoza  MRN: [id number]    INFORMED CONSENT: The procedure, risks, benefits and options were discussed with patient. There are no contraindications to the procedure. The patient expressed understanding and agreed to proceed. The personnel performing the procedure was discussed. I verify that I personally obtained Ryder's consent prior to the start of the procedure and the signed consent can be found on the patient's chart.      Anesthesia: Topical    Pre Procedure diagnosis:   1. Myofascial neck pain    2. Spondylosis without myelopathy or radiculopathy        Post-Procedure diagnosis: same      Sedation: None    PROCEDURE: TRIGGER POINT INJECTION  The patient was placed in a seated position. The site of pain and procedure  were confirmed with the patient prior to starting the procedure. After performing time out. The patient's  trigger points were identified and marked. The skin was prepped with chlorhexidine three times.   After performing time out A 25-gauge 1.5 inch  needle was advanced through the skin and subcutaneous tissues.  Aspiration for blood, air and CSF was negative.  A total of 10 ml of Bupivacaine 0.25% and 40 mg Kenalog  was injected at all trigger point.  No complications were evident. No specimens collected.    Blood Loss: Nill  Specimen: None    Jose D Carrillo MD  4/11/24

## 2024-05-03 DIAGNOSIS — F32.A DEPRESSION, UNSPECIFIED DEPRESSION TYPE: ICD-10-CM

## 2024-05-04 NOTE — TELEPHONE ENCOUNTER
Care Due:                  Date            Visit Type   Department     Provider  --------------------------------------------------------------------------------                                EP -                              PRIMARY      [facility]  Last Visit: 01-      CARE ([facility])   MEDICINE       Zoie Corey  Next Visit: None Scheduled  None         None Found                                                            Last  Test          Frequency    Reason                     Performed    Due Date  --------------------------------------------------------------------------------    Office Visit  15 months..  buPROPion................  01- 03-    [facility] Embedded Care Due Messages. Reference number: [id number].   5/03/2024 10:50:06 PM CDT

## 2024-05-06 RX ORDER — BUPROPION HYDROCHLORIDE 300 MG/1
300 TABLET ORAL DAILY
Qty: 90 TABLET | Refills: 0 | Status: SHIPPED | OUTPATIENT
Start: 2024-05-06 | End: 2024-06-12 | Stop reason: SDUPTHER

## 2024-05-09 NOTE — PATIENT INSTRUCTIONS
Protecting Your Neck: Posture and Body Mechanics  Protecting your neck from injuries and pain involves practicing good posture and body mechanics. This may mean correcting bad habits you have related to the way you hold and move your body. The tips below can help you improve your posture and body mechanics.     Using good posture while at your workstation can help prevent pain or injury.   What Is Posture and Why Does It Matter?  Posture is the way you hold your body. For many of us, this means hunching over, thrusting the chin forward, and slouching the shoulders. But this kind of poor posture keeps muscles from properly supporting the neck and puts stress on muscles, disks, ligaments, and joints in your neck. As a result, injury and pain can occur.  How Is Your Posture?  Use a full-length mirror to check your posture. To begin, stand normally. Then slowly back up against a wall. Is there space between your head and the wall? Do you slouch your shoulders? Is your chin pointing up or down? All these can cause neck pain and injury.  Improving Your Posture  Follow these steps to improve your posture:  Pull your shoulders back.  Think of the ears, shoulders, and hips as a series of dots. Now, adjust your body to connect the dots in a straight line.  Keep your chin level.  What Are Body Mechanics and Why Do They Matter?  The way you move and position your body during daily activities is called body mechanics. Good body mechanics help protect the neck. This means learning the right ways to stand, sit, and even sleep. So do whats best for your neck and practice good body mechanics.  Standing   To protect your neck while standing:  Carry objects close to your body.  Keep your ears and shoulders in a line while standing or walking.  To lower yourself, bend at the knees with a straight back. Do this instead of looking down and reaching for objects.  Work at eye level. Dont reach above your head or tilt your head  back.  Sitting   To protect your neck while sitting:  Set up your workstation so your monitor is at eye level. Also, use a document alba when viewing papers or books.  Keep your knees at or slightly below the level of your hips.  Sit up straight, with feet flat on the floor. If your feet dont touch the floor, use a footrest.  Avoid sitting or driving for long periods. Take frequent breaks.  Sleeping   To protect your neck while sleeping:  Sleep on your back with a pillow under your knees, or on your side with a pillow between bent knees. This helps align the spine.  Avoid using pillows that are too high or too low. Instead, use a neck roll or pillow under your neck while you sleep to keep the neck straight.  Sleep on a mattress that supports you, with a pillow under your neck.  © 3937-2933 Natanael Jarvis, [address] [place], [place] [id number]. All rights reserved. This information is not intended as a substitute for professional medical care. Always follow your healthcare professional's instructions.        Reach and Hold Exercise    Do this exercise on your hands and knees. Keep your knees under your hips and your hands under your shoulders. Keep your spine in a neutral position (not arched or sagging). Keep your ears in line with your shoulders. Hold for a few seconds before starting the exercise.  Tighten your abdominal muscles and raise one arm straight in front of you, palm down. Hold for 5 seconds, then lower. Repeat 5 times.  Do the exercise again, this time lifting your arm to the side. Repeat 5 times.  Do the exercise again, this time lifting your arm backward, palm up. Repeat 5 times.  Switch sides and do each exercise with the other arm.  © 5780-9718 Natanael Jarvis, [address] [place] [address]. All rights reserved. This information is not intended as a substitute for professional medical care. Always follow your healthcare professional's instructions.        Shoulder and Upper Back  Stretch  To start, stand tall with your ears, shoulders, and hips in line. Your feet should be slightly apart, positioned just under your hips. Focus your eyes directly in front of you.  this position for a few seconds before starting your exercise. This helps increase your awareness of proper posture.  Reach overhead and slightly back with both arms. Keep your shoulders and neck aligned and your elbows behind your shoulders.  With your palms facing the ceiling, turn your fingers inward.  Take a deep breath. Breathe out and lower your elbows toward your buttocks. Hold for 5 seconds, then return to starting position.  Repeat 3 times.       © 3082-6426 [facility], [address] [place] [address], [place] [address]. All rights reserved. This information is not intended as a substitute for professional medical care. Always follow your healthcare professional's instructions.        Torticollis (Wry Neck)  Torticollis occurs when muscles on one side of the neck contract (tighten). This causes the neck to twist or tilt to the side. The muscles may also be quite sore. It affects mainly children and young adults, often appearing overnight. It can also affect infants who develop tight neck muscles on one side.  What Causes Torticollis?  Causes of torticollis include:  Damage to the neck muscles from an accident or other trauma  Side effect of certain medications or drugs  Infection of the airways, such as a sore throat  When to Go to the Emergency Room (ER)  All neck problems should be checked by a healthcare provider within 24 hours. Seek emergency care if you can't reach your doctor or these symptoms are present:  Trouble breathing or swallowing  Numbness or weakness in the arms and legs  Trouble walking or speaking  What to Expect in the ER  The neck will be examined, and questions about any current or former medical problems will be asked. X-rays of the neck may be taken to check for broken bones.  Treatment  The goal  in treating torticollis is to relax the neck muscles. The best approach will depend on the cause of the problem. In most cases, one or more of the following may be given:  Medications to help relax the muscles and reduce swelling  Hot and cold compresses to help ease muscle tightness  Botulinum toxin (Botox) injections to prevent further muscle spasms  A soft neck collar to ease discomfort and help healing  Physical therapy to help stretch and relax the muscles  Follow-up  Depending upon the cause, torticollis often goes away on its own. Follow up with your healthcare provider as instructed. If symptoms become worse, call your doctor or return to the ER.  © 1869-6889 Natanael Jarvis, [address] [place] [address], [place] [address]. All rights reserved. This information is not intended as a substitute for professional medical care. Always follow your healthcare professional's instructions.        Understanding Neck Problems       If you suffer from neck pain, youre not alone. Many people have neck pain at some point in their lives. Problems such as poor posture, injury, and wear and tear can lead to neck pain. Your health care provider will work with you to find the treatment thats best for your neck.  Types of Neck Problems  The following problems can cause pain or injury in your neck:  Strains and sprains: Strains (stretched or torn muscles) and sprains (stretched or torn ligaments) can cause neck pain. Strains and sprains can occur during an accident, or when you overuse your neck through repetitive motion. They can also cause your muscles and ligaments to become inflamed (swollen and painful).  Whiplash and other injuries: Whiplash can result when an impact throws your head, forcing your neck too far forward (hyperflexion), then too far backward (hyperextension). When combined, the two motions can cause a painful injury to different parts of your neck, such as muscles, ligaments, or joints. The most common cause of  whiplash is a car accident. But it can also happen during a fall or sports injury.  Weakened disks: A simple action, such as a sneeze or a cough, can cause one of your disks to bulge (herniate). A herniated disk can put pressure on your nerve and cause pain. Over time, disks can also thin out (degenerate). Flattened disks dont cushion vertebrae well and can cause vertebrae to rub together. Rubbing vertebrae can pinch nerves and cause pain.  Weakened joints: Aging and injury can cause joints to slowly degenerate. Thinned joints can also cause vertebrae to rub together. This can cause abnormal growths of bone (bone spurs) to form on vertebrae. Bone spurs put pressure on nerves, causing pain.  Common Symptoms  If you have a neck problem, you may have one or more of the following symptoms:  Muscle tension and spasm: You may not be able to move your neck, arms, or shoulders comfortably if you have muscle tension or stiffness in your neck. If your symptoms arent relieved, you may experience muscle spasms, or knots of contracted tissue (trigger points) in areas of your neck and shoulders.  Aches and pains: Dull aches in your head or neck, sharp pains, and swelling of the soft tissue of your neck and shoulders are common symptoms. If theres pressure on the nerves in your neck, you may feel pain in your arms or hands (referred pain).  Numbness or weakness: If you injure the nerves in your neck, you may experience numbness, tingling, or weakness in your shoulders, arms, or hands. These symptoms arise when disks or bone spurs press on the nerves in your neck.  © 5658-8831 Natanael Jarvis, [address] [place] [address], [place] [address]. All rights reserved. This information is not intended as a substitute for professional medical care. Always follow your healthcare professional's instructions.        Neck Exercises: Active Neck Rotation  To start, lie on your back, knees bent and feet flat on the floor. Keep your ears, shoulders,  and hips aligned, but dont press your lower back to the floor. Rest your hands on your pelvis. Breathe deeply and relax.  Use your neck muscles to turn your head to one side until you feel a stretch in the muscles.  Hold for 5 seconds. Then turn to the other side.  Repeat 5 times on each side.  Note: Keep your shoulders on the floor. Dont lift or tuck your chin as you turn your head.  © 0834-9551 [place] [address]. All rights reserved. This information is not intended as a substitute for professional medical care. Always follow your healthcare professional's instructions.        Neck Exercises: Arm Lift            To start, lie on your back, knees bent and feet flat on the floor. Keep your ears, shoulders, and hips aligned, but dont press your lower back to the floor. Rest your hands on your pelvis. Breathe deeply and relax.  Raise one arm overhead, then lower it. As you lower that arm, raise the other arm.  Continue to move both arms in slow, smooth arcs. Keep your arms straight and your head and neck relaxed.  Repeat 10 times with each arm.  For your safety, check with your healthcare provider before starting an exercise program.   © 0094-2446 [facility], [address] [place] [address]. All rights reserved. This information is not intended as a substitute for professional medical care. Always follow your healthcare professional's instructions.        Exercises: Neck Isometrics  To start, sit in a chair with your feet flat on the floor. Your weight should be slightly forward so that youre balanced evenly on your buttocks. Relax your shoulders and keep your head level. Using a chair with arms may help you keep your balance.  Press your palm against your forehead. Resist with your neck muscles. Hold for 10 seconds. Relax. Repeat 5 times.  Do the exercise again, pressing on the side of your head. Repeat 5 times. Switch sides.  Do the exercise again, pressing  on the back of your head. Repeat 5 times.       For your safety, check with your healthcare provider before starting an exercise program.   © 1492-3915 Natanael [facility], [address] [place] [address]. All rights reserved. This information is not intended as a substitute for professional medical care. Always follow your healthcare professional's instructions.        Neck Exercises: Passive Neck Rotation        To start, lie on your back, knees bent and feet flat on the floor. Keep your ears, shoulders, and hips aligned, but dont press your lower back to the floor. Rest your hands on your pelvis. Breathe deeply and relax.  With your neck relaxed, place the palm of one hand on your forehead. Use your hand to turn your head to one side until you feel a stretch in the neck muscles. Do not push through pain.  Hold for 5 seconds. Then turn to the other side.  Repeat 5 times on each side.   Note: Keep your shoulders on the floor. Dont lift your chin as you turn your head.  © 8641-6676 Natanael [facility], [address] [place], [place] [id number]. All rights reserved. This information is not intended as a substitute for professional medical care. Always follow your healthcare professional's instructions.        Neck Pain [No Trauma]  There are several possible causes of neck pain without injury:  You can get a minor ligament sprain or muscle strain from a sudden minor neck movement. Sleeping with your neck in an awkward position can also cause this.  Some persons respond to emotional stress by tensing the muscles of their neck, shoulders and upper back. Chronic spasm in these muscles can cause neck pain and sometimes headaches.  Gradual wear and tear of the joints in the spine can cause degenerative arthritis. This can be a source of occasional or chronic neck pain.  With aging or repeated small injuries to the neck, the spinal disks (the cushions between each spinal bone) may bulge and put pressure on a nearby  spinal nerve. This causes tingling, pain or numbness spreading from the neck to the shoulder, arm or hand on one side.  Acute neck pain usually gets better in one to two weeks. Neck pain related to disk disease, arthritis in the spinal joints or spinal stenosis (narrowing of the spinal canal) can become chronic and last for months or years.  Unless you had a forceful physical injury (for example, a car accident or fall), X-rays are usually not ordered for the initial evaluation of neck pain. If pain continues and does not respond to medical treatment, x-rays and other tests may be performed at a later time.  Home Care:  Rest and relax the muscles. Use a comfortable pillow that supports the head and keeps the spine in a neutral position. The position of the head should not be tilted forward or backward. A rolled up towel may help for a custom fit.  Some persons find relief with heat (hot shower, hot bath or heating pad) and massage, while others prefer cold packs (crushed or cubed ice in a plastic bag, wrapped in a towel) . Try both and use the method that feels best for 20 minutes several times a day.  You may use acetaminophen (Tylenol) or ibuprofen (Motrin, Advil) to control pain, unless another medicine was prescribed. [ NOTE : If you have chronic liver or kidney disease or ever had a stomach ulcer or GI bleeding, talk with your doctor before using these medicines.]  Follow Up  with your physician or this facility if your symptoms do not show signs of improvement after one week. Physical therapy or further tests may be needed.  [NOTE: A radiologist will review any X-rays or CT scans that were taken. We will notify you of any new findings that may affect your care.]  Get Prompt Medical Attention  if any of the following occur:  Pain becomes worse or spreads into one or both arms  Weakness or numbness in one or both arms  Increasing headache  Neck swelling, difficulty or painful swallowing  Fever of 100.4ºF (38ºC)  or higher, or as directed by your healthcare provider  © 9563-4440 Natanael Jarvis, [address] [facility], [place] [address]. All rights reserved. This information is not intended as a substitute for professional medical care. Always follow your healthcare professional's instructions.      Neck Problems: Relieving Your Symptoms  The first goal of treatment is to relieve your symptoms. Your health care provider may recommend self-care treatments. These include resting, applying ice and heat, taking medication, and doing exercises. Your health care provider may also recommend that you see a physical therapist, who can teach you ways to care for and strengthen your neck.     Heat relaxes sore muscles and helps relieve spasms.   Self-Care Treatments  Pain can end quickly or last awhile. Either way, youll want relief as soon as possible. Your health care provider can tell you which treatments to do at home to help relieve your pain.  Lying down for a short time takes pressure from the head off the neck.  Ice and heat can help reduce pain. To bring down swelling, rest an ice pack wrapped in a thin towel on your neck for 15 minutes. To relax sore muscles, apply a warm, wet towel to the area. Or take a warm bath or shower.  Over-the-counter medications, such as ibuprofen, naproxen, and aspirin, can help reduce pain and swelling. Acetaminophen can help relieve pain. Use these only as directed.  Exercises can relax muscles and prevent stiffness. To prepare, drape a warm, wet towel around your neck and shoulders for 5 minutes. Remove the towel. Then do any exercises recommended to you by your health care provider.  Physical Therapy  If self-care treatments arent helping relieve neck pain, your health care provider may suggest one or more sessions of physical therapy. Physical therapy is performed by a specialist trained to treat injuries. Your physical therapist (PT) will teach you how to strengthen muscles, improve the  spines alignment, and help you move properly. Treatment methods used in physical therapy may include:  Heat. A special heating pad called a neck pack may be applied to your neck.  Exercises. Your PT will teach you exercises to help strengthen your neck and improve its range of motion.  Joint mobilization. The PT gently moves your vertebrae to help restore motion in your neck joints and reduce neck pain.  Soft tissue mobilization. The PT massages and stretches the muscles in your neck and shoulders.  Electrical stimulation. Electrical impulses are sent into your neck. This helps reduce soreness and inflammation.  Education in body mechanics. The PT shows you ways to position and move your body that protect the neck.  Other Treatments  If physical therapy doesnt relieve your neck pain, your health care provider may suggest other treatments. For example, medications or injections can help relieve pain and swelling. In some cases, surgery may be needed to treat neck problems.  © 7658-3245 [facility], [address] [place] [address], [place] [id number]. All rights reserved. This information is not intended as a substitute for professional medical care. Always follow your healthcare professional's instructions.      Also check     https://order.ceci.nih.gov/sites/default/files/2018-04/ceci-exercise-guide.pdf

## 2024-05-13 ENCOUNTER — PATIENT MESSAGE (OUTPATIENT)
Dept: INTERNAL MEDICINE | Facility: CLINIC | Age: 49
End: 2024-05-13
Payer: COMMERCIAL

## 2024-05-13 DIAGNOSIS — R06.83 SNORING: Primary | ICD-10-CM

## 2024-06-12 ENCOUNTER — PATIENT MESSAGE (OUTPATIENT)
Dept: INTERNAL MEDICINE | Facility: CLINIC | Age: 49
End: 2024-06-12

## 2024-06-12 ENCOUNTER — OFFICE VISIT (OUTPATIENT)
Dept: INTERNAL MEDICINE | Facility: CLINIC | Age: 49
End: 2024-06-12
Attending: INTERNAL MEDICINE
Payer: COMMERCIAL

## 2024-06-12 VITALS
HEIGHT: 74 IN | OXYGEN SATURATION: 96 % | WEIGHT: 251.75 LBS | HEART RATE: 79 BPM | DIASTOLIC BLOOD PRESSURE: 84 MMHG | BODY MASS INDEX: 32.31 KG/M2 | SYSTOLIC BLOOD PRESSURE: 116 MMHG

## 2024-06-12 DIAGNOSIS — Z00.00 ANNUAL PHYSICAL EXAM: Primary | ICD-10-CM

## 2024-06-12 DIAGNOSIS — Q89.8 STICKLER'S SYNDROME: ICD-10-CM

## 2024-06-12 DIAGNOSIS — F32.A DEPRESSION, UNSPECIFIED DEPRESSION TYPE: ICD-10-CM

## 2024-06-12 DIAGNOSIS — H35.413 LATTICE DEGENERATION, BOTH EYES: ICD-10-CM

## 2024-06-12 DIAGNOSIS — E66.9 OBESITY (BMI 30.0-34.9): ICD-10-CM

## 2024-06-12 DIAGNOSIS — Z13.1 SCREENING FOR DIABETES MELLITUS: ICD-10-CM

## 2024-06-12 PROCEDURE — 1160F RVW MEDS BY RX/DR IN RCRD: CPT | Mod: CPTII,S$GLB,, | Performed by: INTERNAL MEDICINE

## 2024-06-12 PROCEDURE — 99396 PREV VISIT EST AGE 40-64: CPT | Mod: S$GLB,,, | Performed by: INTERNAL MEDICINE

## 2024-06-12 PROCEDURE — 3074F SYST BP LT 130 MM HG: CPT | Mod: CPTII,S$GLB,, | Performed by: INTERNAL MEDICINE

## 2024-06-12 PROCEDURE — 99999 PR PBB SHADOW E&M-EST. PATIENT-LVL III: CPT | Mod: PBBFAC,,, | Performed by: INTERNAL MEDICINE

## 2024-06-12 PROCEDURE — 3044F HG A1C LEVEL LT 7.0%: CPT | Mod: CPTII,S$GLB,, | Performed by: INTERNAL MEDICINE

## 2024-06-12 PROCEDURE — 3079F DIAST BP 80-89 MM HG: CPT | Mod: CPTII,S$GLB,, | Performed by: INTERNAL MEDICINE

## 2024-06-12 PROCEDURE — 3008F BODY MASS INDEX DOCD: CPT | Mod: CPTII,S$GLB,, | Performed by: INTERNAL MEDICINE

## 2024-06-12 PROCEDURE — 1159F MED LIST DOCD IN RCRD: CPT | Mod: CPTII,S$GLB,, | Performed by: INTERNAL MEDICINE

## 2024-06-12 RX ORDER — BUPROPION HYDROCHLORIDE 300 MG/1
300 TABLET ORAL DAILY
Qty: 90 TABLET | Refills: 3 | Status: SHIPPED | OUTPATIENT
Start: 2024-06-12

## 2024-06-12 NOTE — PROGRESS NOTES
"Subjective:   Patient ID: Ryder Mendoza is a 49 y.o. male  Chief complaint:   Chief Complaint   Patient presents with    Annual Exam       HPI  Here for annual exam    Followed by pain yoli pastor pain and s/p TPI 4/2024  Plantar fasciitis - seen by harry Agee   Calcific tendonitis of left shoulder: attended PT and ofllowed by sports med    Still working at [employer] - understaffed     Exercising - tennis 1-2 times per week   Weight training once per week   Less Biking   Previously:   - right arm/shoulder soreness - intermittent - an last 2 days up to a week   - improves with rest and otc meds      Depression and anxiety:   - well controlled on current dose of wellbutrin  - No si/hi/clarke    HLD:   - due for repeat labs   - dad had CVA - has hx afib and likely attrib to that   - no other family hx of cad/cva    Hearing loss:   Revisiting this and has appt     Snoring - sleep study     No intermittency or hesitancy, weak stream, incontinence, nocturia  No increased urgency, increased frequency, burning with urination, hematuria, foul smelling urine, cloudy urine, lower back pain, suprapubic pain or pressure.  No pain with defecation or testicular pain. No penile discharge   No fevers or chills    HM:  Covid     Ophthalmology: Mike  Urology: Dee  ENT: Supa  Gen surg: right ing hernia repair   Psych: Cristian    Review of Systems    Objective:  Vitals:    06/12/24 0931   BP: 116/84   BP Location: Left arm   Patient Position: Sitting   Pulse: 79   SpO2: 96%   Weight: 114.2 kg (251 lb 12.3 oz)   Height: 6' 2" (1.88 m)       Body mass index is 32.32 kg/m².    Physical Exam  Vitals reviewed.   Constitutional:       Appearance: Normal appearance. He is well-developed.   HENT:      Head: Normocephalic and atraumatic.      Right Ear: Tympanic membrane, ear canal and external ear normal.      Left Ear: Tympanic membrane, ear canal and external ear normal.      Nose: Nose normal. No congestion.     "  Mouth/Throat:      Mouth: Mucous membranes are moist.      Pharynx: Oropharynx is clear.   Eyes:      Conjunctiva/sclera: Conjunctivae normal.   Neck:      Thyroid: No thyromegaly.   Cardiovascular:      Rate and Rhythm: Normal rate and regular rhythm.      Pulses: Normal pulses.      Heart sounds: Normal heart sounds.   Pulmonary:      Effort: Pulmonary effort is normal.      Breath sounds: Normal breath sounds.   Abdominal:      General: Bowel sounds are normal.      Palpations: Abdomen is soft.   Musculoskeletal:         General: No swelling or tenderness.      Cervical back: Neck supple.   Lymphadenopathy:      Cervical: No cervical adenopathy.   Skin:     General: Skin is warm and dry.      Capillary Refill: Capillary refill takes less than 2 seconds.   Neurological:      General: No focal deficit present.      Mental Status: He is alert and oriented to person, place, and time.   Psychiatric:         Mood and Affect: Mood normal.         Behavior: Behavior normal.         Thought Content: Thought content normal.         Judgment: Judgment normal.     Assessment:  1. Annual physical exam    2. Depression, unspecified depression type    3. Screening for diabetes mellitus    4. Lattice degeneration, both eyes    5. Stickler's syndrome    6. Obesity (BMI 30.0-34.9)      Plan:  Annual physical exam  -     Hemoglobin A1C; Future; Expected date: 06/12/2024  -     TSH; Future; Expected date: 06/12/2024  -     Lipid Panel; Future; Expected date: 06/12/2024  -     CBC Auto Differential; Future; Expected date: 06/12/2024  -     Comprehensive Metabolic Panel; Future; Expected date: 06/12/2024    Depression, unspecified depression type  -     buPROPion (WELLBUTRIN XL) 300 MG 24 hr tablet; Take 1 tablet (300 mg total) by mouth once daily.  Dispense: 90 tablet; Refill: 3    Screening for diabetes mellitus  -     Hemoglobin A1C; Future; Expected date: 06/12/2024    Lattice degeneration, both eyes  -     Ambulatory  referral/consult to Ophthalmology; Future; Expected date: 06/19/2024    Stickler's syndrome  -     Ambulatory referral/consult to Ophthalmology; Future; Expected date: 06/19/2024    Obesity (BMI 30.0-34.9)  - cont diet and exercise  - increase intensity and duration of CV exercise to continue weight loss  - goal wt loss one pound per week  - portion control, healthy choices    Recommend daily sunscreen, cardiovascular exercise min 30 min 5 days per week. Seatbelts routinely.  Check approp labs   F/u with eye specialist   Cont current medication   I recommend the Mediterranean diet, a graded exercise program and maintaining a healthy weight to optimize cholesterol levels.  Consider ct calcium score pending results - can consider lip a and apo b next pending results     Health Maintenance   Topic Date Due    TETANUS VACCINE  12/02/2026    Lipid Panel  06/13/2029    Colorectal Cancer Screening  05/23/2033    Hepatitis C Screening  Completed

## 2024-06-13 ENCOUNTER — LAB VISIT (OUTPATIENT)
Dept: LAB | Facility: OTHER | Age: 49
End: 2024-06-13
Attending: INTERNAL MEDICINE
Payer: COMMERCIAL

## 2024-06-13 DIAGNOSIS — Z00.00 ANNUAL PHYSICAL EXAM: ICD-10-CM

## 2024-06-13 DIAGNOSIS — Z13.1 SCREENING FOR DIABETES MELLITUS: ICD-10-CM

## 2024-06-13 LAB
ALBUMIN SERPL BCP-MCNC: 3.9 G/DL (ref 3.5–5.2)
ALP SERPL-CCNC: 62 U/L (ref 55–135)
ALT SERPL W/O P-5'-P-CCNC: 31 U/L (ref 10–44)
ANION GAP SERPL CALC-SCNC: 8 MMOL/L (ref 8–16)
AST SERPL-CCNC: 22 U/L (ref 10–40)
BASOPHILS # BLD AUTO: 0.06 K/UL (ref 0–0.2)
BASOPHILS NFR BLD: 0.9 % (ref 0–1.9)
BILIRUB SERPL-MCNC: 0.3 MG/DL (ref 0.1–1)
BUN SERPL-MCNC: 13 MG/DL (ref 6–20)
CALCIUM SERPL-MCNC: 9.2 MG/DL (ref 8.7–10.5)
CHLORIDE SERPL-SCNC: 107 MMOL/L (ref 95–110)
CHOLEST SERPL-MCNC: 226 MG/DL (ref 120–199)
CHOLEST/HDLC SERPL: 4.7 {RATIO} (ref 2–5)
CO2 SERPL-SCNC: 24 MMOL/L (ref 23–29)
CREAT SERPL-MCNC: 1 MG/DL (ref 0.5–1.4)
DIFFERENTIAL METHOD BLD: NORMAL
EOSINOPHIL # BLD AUTO: 0.3 K/UL (ref 0–0.5)
EOSINOPHIL NFR BLD: 3.6 % (ref 0–8)
ERYTHROCYTE [DISTWIDTH] IN BLOOD BY AUTOMATED COUNT: 12.5 % (ref 11.5–14.5)
EST. GFR  (NO RACE VARIABLE): >60 ML/MIN/1.73 M^2
ESTIMATED AVG GLUCOSE: 103 MG/DL (ref 68–131)
GLUCOSE SERPL-MCNC: 96 MG/DL (ref 70–110)
HBA1C MFR BLD: 5.2 % (ref 4–5.6)
HCT VFR BLD AUTO: 45 % (ref 40–54)
HDLC SERPL-MCNC: 48 MG/DL (ref 40–75)
HDLC SERPL: 21.2 % (ref 20–50)
HGB BLD-MCNC: 14.7 G/DL (ref 14–18)
IMM GRANULOCYTES # BLD AUTO: 0.02 K/UL (ref 0–0.04)
IMM GRANULOCYTES NFR BLD AUTO: 0.3 % (ref 0–0.5)
LDLC SERPL CALC-MCNC: 152.6 MG/DL (ref 63–159)
LYMPHOCYTES # BLD AUTO: 2.4 K/UL (ref 1–4.8)
LYMPHOCYTES NFR BLD: 35.3 % (ref 18–48)
MCH RBC QN AUTO: 30.8 PG (ref 27–31)
MCHC RBC AUTO-ENTMCNC: 32.7 G/DL (ref 32–36)
MCV RBC AUTO: 94 FL (ref 82–98)
MONOCYTES # BLD AUTO: 0.8 K/UL (ref 0.3–1)
MONOCYTES NFR BLD: 12.2 % (ref 4–15)
NEUTROPHILS # BLD AUTO: 3.3 K/UL (ref 1.8–7.7)
NEUTROPHILS NFR BLD: 47.7 % (ref 38–73)
NONHDLC SERPL-MCNC: 178 MG/DL
NRBC BLD-RTO: 0 /100 WBC
PLATELET # BLD AUTO: 291 K/UL (ref 150–450)
PMV BLD AUTO: 10 FL (ref 9.2–12.9)
POTASSIUM SERPL-SCNC: 4.5 MMOL/L (ref 3.5–5.1)
PROT SERPL-MCNC: 6.9 G/DL (ref 6–8.4)
RBC # BLD AUTO: 4.78 M/UL (ref 4.6–6.2)
SODIUM SERPL-SCNC: 139 MMOL/L (ref 136–145)
TRIGL SERPL-MCNC: 127 MG/DL (ref 30–150)
TSH SERPL DL<=0.005 MIU/L-ACNC: 2.8 UIU/ML (ref 0.4–4)
WBC # BLD AUTO: 6.86 K/UL (ref 3.9–12.7)

## 2024-06-13 PROCEDURE — 83036 HEMOGLOBIN GLYCOSYLATED A1C: CPT | Performed by: INTERNAL MEDICINE

## 2024-06-13 PROCEDURE — 84443 ASSAY THYROID STIM HORMONE: CPT | Performed by: INTERNAL MEDICINE

## 2024-06-13 PROCEDURE — 36415 COLL VENOUS BLD VENIPUNCTURE: CPT | Performed by: INTERNAL MEDICINE

## 2024-06-13 PROCEDURE — 85025 COMPLETE CBC W/AUTO DIFF WBC: CPT | Performed by: INTERNAL MEDICINE

## 2024-06-13 PROCEDURE — 80053 COMPREHEN METABOLIC PANEL: CPT | Performed by: INTERNAL MEDICINE

## 2024-06-13 PROCEDURE — 80061 LIPID PANEL: CPT | Performed by: INTERNAL MEDICINE

## 2024-06-21 LAB
LEFT EYE DM RETINOPATHY: NEGATIVE
RIGHT EYE DM RETINOPATHY: NEGATIVE

## 2024-07-05 ENCOUNTER — HOSPITAL ENCOUNTER (OUTPATIENT)
Dept: RADIOLOGY | Facility: OTHER | Age: 49
Discharge: HOME | End: 2024-07-05
Attending: INTERNAL MEDICINE
Payer: COMMERCIAL

## 2024-07-05 DIAGNOSIS — E78.5 HYPERLIPIDEMIA, UNSPECIFIED HYPERLIPIDEMIA TYPE: ICD-10-CM

## 2024-07-05 DIAGNOSIS — Z13.6 ENCOUNTER FOR SCREENING FOR CARDIOVASCULAR DISORDERS: ICD-10-CM

## 2024-07-05 PROCEDURE — 75571 CT HRT W/O DYE W/CA TEST: CPT | Mod: TC

## 2024-07-05 PROCEDURE — 75571 CT HRT W/O DYE W/CA TEST: CPT | Mod: 26,,, | Performed by: RADIOLOGY

## 2024-07-12 ENCOUNTER — PATIENT OUTREACH (OUTPATIENT)
Dept: ADMINISTRATIVE | Facility: HOSPITAL | Age: 49
End: 2024-07-12
Payer: COMMERCIAL

## 2024-07-12 NOTE — PROGRESS NOTES
Health Maintenance Due   Topic Date Due    COVID-19 Vaccine (4 - 2023-24 season) 09/01/2023   Health Maintenance reviewed, updated and links triggered. ([place]) 7/12/24   Uploaded Eye exam results

## 2024-07-16 ENCOUNTER — OFFICE VISIT (OUTPATIENT)
Dept: SLEEP MEDICINE | Facility: CLINIC | Age: 49
End: 2024-07-16
Attending: INTERNAL MEDICINE
Payer: COMMERCIAL

## 2024-07-16 DIAGNOSIS — G47.19 EXCESSIVE DAYTIME SLEEPINESS: ICD-10-CM

## 2024-07-16 DIAGNOSIS — R06.83 SNORING: ICD-10-CM

## 2024-07-16 DIAGNOSIS — F51.09 OTHER INSOMNIA NOT DUE TO A SUBSTANCE OR KNOWN PHYSIOLOGICAL CONDITION: ICD-10-CM

## 2024-07-16 DIAGNOSIS — R35.1 NOCTURIA: ICD-10-CM

## 2024-07-16 DIAGNOSIS — R06.81 WITNESSED EPISODE OF APNEA: Primary | ICD-10-CM

## 2024-07-16 PROCEDURE — 1160F RVW MEDS BY RX/DR IN RCRD: CPT | Mod: CPTII,95,, | Performed by: PHYSICIAN ASSISTANT

## 2024-07-16 PROCEDURE — 3044F HG A1C LEVEL LT 7.0%: CPT | Mod: CPTII,95,, | Performed by: PHYSICIAN ASSISTANT

## 2024-07-16 PROCEDURE — 99204 OFFICE O/P NEW MOD 45 MIN: CPT | Mod: 95,,, | Performed by: PHYSICIAN ASSISTANT

## 2024-07-16 PROCEDURE — 1159F MED LIST DOCD IN RCRD: CPT | Mod: CPTII,95,, | Performed by: PHYSICIAN ASSISTANT

## 2024-07-16 NOTE — PROGRESS NOTES
The chief complaint leading to consultation is: snoring     Visit type: audiovisual     The patient location is: [place]     12 minutes of total time spent on the encounter, which includes face to face time and non-face to face time preparing to see the patient (eg, review of tests), Obtaining and/or reviewing separately obtained history, Documenting clinical information in the electronic or other health record, Independently interpreting results (not separately reported) and communicating results to the patient/family/caregiver, or Care coordination (not separately reported).      Each patient to whom he or she provides medical services by telemedicine is:  (1) informed of the relationship between the physician and patient and the respective role of any other health care provider with respect to management of the patient; and (2) notified that he or she may decline to receive medical services by telemedicine and may withdraw from such care at any time.        Referred by Zoie Corey MD     NEW PATIENT VISIT    Ryder Mendoza  is a pleasant 49 y.o. male  with PMH significant for HLD, sticker's syndrome, SANYA, MDD who presents for sleep evaluation following referral from PCP      C/o heavy snoring, witnessed apneas, poor disrupted and often un-refreshing sleep, and excessive daytime sleepiness and fatigue. States his wife is a physician and is concerned about ARCENIO. Presents today for ARCENIO evaluation.      Past Medical History:   Diagnosis Date    Cataract     Diarrhea 2015    c-diff    Lattice degeneration, both eyes     MRSA (methicillin resistant staph aureus) culture positive     Stickler's syndrome      Patient Active Problem List   Diagnosis    Stickler's syndrome    Lattice degeneration, both eyes    Nuclear sclerosis of both eyes    Nuclear sclerosis    Nuclear sclerotic cataract of right eye    Poor concentration    SANYA (generalized anxiety disorder)    Mild episode of recurrent major depressive  disorder    Other hyperlipidemia    Obesity (BMI 30.0-34.9)    Dysfunction of left rotator cuff    Scapular dysfunction       Current Outpatient Medications:     buPROPion (WELLBUTRIN XL) 300 MG 24 hr tablet, Take 1 tablet (300 mg total) by mouth once daily., Disp: 90 tablet, Rfl: 3     There were no vitals filed for this visit.    Physical Exam:    GEN:   Well-appearing  Psych:  Appropriate affect, demonstrates insight  SKIN:  No rash on the face or bridge of the nose      LABS:   Lab Results   Component Value Date    HGB 14.7 06/13/2024    CO2 24 06/13/2024         RECORDS REVIEWED:    No previous sleep study    ASSESSMENT    PROBLEM DESCRIPTION/ Sx on Presentation  STATUS   Sx ARCENIO   + snoring, + witnessed apneas    New   Daytime Sx   + sleepiness when inactive   ESS n/a/24 on intake  New   Insomnia   Trouble falling asleep: not usually difficult  Arousals:         2 x nightly  Hard to get back to sleep?: not usually difficult    Prior pertinent medications:  Current pertinent medications:   New   Nocturia   x 1-2 per sleep period  New   Other issues:       PLAN      -recommend sleep testing   -PSG ordered  -discussed trial therapy if ARCENIO present and the patient is open to a trial of CPAP therapy  -discussed ARCENIO and PAP with patient in detail, including possible complications of untreated ARCENIO like heart attack/stroke  -advised on strict driving precautions; advised never to drive drowsy     Advised on plan of care. Answered all patient questions. Patient verbalized understanding and voiced agreement with plan of care.     RTC if dx of ARCENIO made and CPAP ordered, will need follow up 31-90 days after receiving machine for compliance         The patient was given open opportunity to ask questions and/or express concerns about treatment plan. All questions/concerns were discussed.     Two patient identifiers used prior to evaluation.

## 2024-08-02 ENCOUNTER — TELEPHONE (OUTPATIENT)
Dept: OPHTHALMOLOGY | Facility: CLINIC | Age: 49
End: 2024-08-02
Payer: COMMERCIAL

## 2024-08-02 NOTE — TELEPHONE ENCOUNTER
"----- Message from Kalin Lam sent at 8/2/2024 10:24 AM CDT -----  Consult/Advisory    Name Of Caller: Yolanda Jimenez [ - Dr. Mckeon]    Contact Preference?: requesting to contact pt [phone] (home)     What is the nature of the call?: Requesting for 8/26 appt to be r/s for 8/5-8/9    Additional Notes: Stating Dr. Mckeon feels pt should be seen much sooner    "Thank you for all that you do for our patients"  "

## 2024-08-05 ENCOUNTER — HOSPITAL ENCOUNTER (OUTPATIENT)
Dept: SLEEP MEDICINE | Facility: OTHER | Age: 49
Discharge: HOME | End: 2024-08-05
Attending: PHYSICIAN ASSISTANT
Payer: COMMERCIAL

## 2024-08-05 DIAGNOSIS — R06.83 SNORING: ICD-10-CM

## 2024-08-05 DIAGNOSIS — R35.1 NOCTURIA: ICD-10-CM

## 2024-08-05 DIAGNOSIS — G47.19 EXCESSIVE DAYTIME SLEEPINESS: ICD-10-CM

## 2024-08-05 DIAGNOSIS — F51.09 OTHER INSOMNIA NOT DUE TO A SUBSTANCE OR KNOWN PHYSIOLOGICAL CONDITION: ICD-10-CM

## 2024-08-05 DIAGNOSIS — R06.81 WITNESSED EPISODE OF APNEA: ICD-10-CM

## 2024-08-05 PROCEDURE — 95811 POLYSOM 6/>YRS CPAP 4/> PARM: CPT

## 2024-08-06 NOTE — PROGRESS NOTES
A split study was performed on  49 year old man, Ryder Mendoza. The following was explained to the pt prior to the study: the time to bed and wake time, the set-up process timeframe and the purpose of each sensor, the reason (if sensors fall off) the technician will need to enter the room during the night, the possibility of the tech fitting a PAP mask on pt for treatment in the middle of the night, and how to call out for assistance during the night. A post-study letter was handed to the pt in the morning.     Mask used: M Simplus

## 2024-08-08 PROBLEM — R06.81 WITNESSED EPISODE OF APNEA: Status: ACTIVE | Noted: 2024-08-08

## 2024-08-08 NOTE — PROCEDURES
"[facility]    Split-Night Study Interpretation Report    Patient Name:  Ryder Mendoza  MRN#:  [id number]  :  1975  Study Date:  2024  Referring Provider:  GRADY Evans    Indications for Polysomnography:  The patient is a 49 year old Male who is 6' 2" and weighs 251.0 lbs.  His BMI equals 32.2.  [place] was - and Neck Circumference was -.  A diagnostic polysomnogram was performed to evaluate for -.  After 240.5 minutes of sleep time the patient exhibited sufficient respiratory events qualifying him for a PAP trial which was then initiated.     Polysomnogram Data:  A full night polysomnogram was performed recording the standard physiologic parameters including EEG, EOG, EMG, EKG, nasal and oral airflow.  Respiratory parameters of chest and abdominal movements are recorded with (RIP) Respiratory Inductance Plethsmography.  Oxygen saturation was recorded by pulse oximetry.    Sleep Architecture:  The total recording time of the diagnostic portion of the study was 289.2 minutes.  The total sleep time was 240.5 minutes.  The patient spent 7.7% of total sleep time in Stage N1, 44.3% in Stage N2, 24.1% in Stages N3, and 23.9% in REM.  Sleep latency was 32.2 minutes.  REM latency was 77.0 minutes.  Sleep Efficiency was 83.2%.  Total wake time was 49.0 minutes for a total wake percentage of 5.5%.  Wake after Sleep Onset was 16.5 minutes.     At 02:02:44 AM the patient was placed on PAP treatment and was titrated at pressures ranging from 5* cm/H20 up to 10* cm/H20.  The total recording time of the treatment portion of the study was 177.4 minutes.  The total sleep time was 161.0 minutes.  During the treatment portion of the study, the patient spent 6.2% of total sleep time in Stage N1, 66.8% in Stage N2, 0.0% in Stages N3, and 27.0% in REM.  Sleep latency was 6.5 minutes.  REM latency was 84.5 minutes.  Sleep Efficiency was 90.7%.  Total wake time was 16.5 minutes for a total wake " percentage of 9.3%.  Wake after Sleep Onset was 9.5 minutes.     Respiratory Summary:  During the diagnostic portion of the study, the polysomnogram revealed a presence of 1 obstructive, 2 central, and - mixed apneas resulting in an Apnea index of 0.7 events per hour.  There were 68 hypopneas resulting in a Hypopnea index of 17.0 events per hour.  The combined Apnea/Hypopnea index was 17.7 events per hour.  There were a total of - RERA events resulting in a Respiratory Disturbance Index (RDI) of 17.7 events per hour.  Lowest oxygen saturation was 88.0% and time spent ?88% oxygen saturation was 0.7 minutes (0.2%).    During diagnostic portion End Tidal CO2 during sleep ranged from - to - mmHg, was greater than 50 mmHg for - minutes and greater than 55 mmHg for - minutes.  Transcutaneous CO2 during sleep ranged from - to - mmHg, was greater than 50 mmHg for - minutes and greater than 55 mmHg for - minutes.    During the treatment portion of the study, the polysomnogram revealed a presence of 2 obstructive, 3 central, and - mixed apneas resulting in an Apnea index of 1.9 events per hour.  There were 25 hypopneas resulting in a Hypopnea index of 9.3 events per hour.  The combined Apnea/Hypopnea index was 11.2 events per hour.  There were a total of - RERA events resulting in a Respiratory Disturbance Index (RDI) of 11.2 events per hour.  Lowest oxygen saturation was 90.0% and time spent ?88% oxygen saturation was - minutes (-).    During treatment portion Transcutaneous CO2 during sleep ranged from - to - mmHg, was greater than 50 mmHg for - minutes and greater than 55 mmHg for - minutes.    Limb Movement Activity:  During the diagnostic portion of the study, there were - limb movements recorded.  Of this total, - were classified as PLMs.  Of the PLMs, - were associated with arousals.  The Limb Movement index was - per hour while the PLM index was - per hour and PLM with arousals index was - per hour.    Cardiac: single  "lead EKG revealed normal sinus rhythm     PAP titration:    Mask:  M simplus  CPAP = 9 cwp was largely effective in lateral position in stage N2 sleep  CPAP = 9 cwp was largely effective in supine position in stage N2 sleep  CPAP = 9 cwp was partially effective in supine position in stage REM sleep      Oxygenation:  Hypoxemia was only observed in relation to obstructive events.  At therapeutic levels of PAP therapy, there was no baseline hypoxemia.    Impression:  -obstructive sleep apnea       Recommendations:  -initial auto-CPAP settings CPAP min = 9 cwp  and CPAP max =  12 cwp  -if the patient complains of sleep disruption, CPAP min should be adjusted to 10cwp or higher depending on pressure tolerance  -ramp = 9 cwp or higher to achieve sleep onset   -the patient has follow up with Sleep Medicine        Deniz Tsang MD  (This Sleep Study was interpreted by a Board Certified Sleep Specialist who conducted an epoch-by-epoch review of the entire raw data recording.)  (The indication for this sleep study was reviewed and deemed appropriate by [facility] Practice Parameters or other reasons by a Board Certified Sleep Specialist.)    [facility]    Split-Night Report    Patient Name: Ryder Mendoza Study Date: 8/5/2024   YOB: 1975 MRN #: [id number]   Age: 49 year LORNA #: [id number]   Sex: Male Referring Provider: GRADY Evans   Height: 6' 2" Recording Tech: Tonja Haas RRT SDS   Weight: 251.0 lbs Scoring Tech: Edwardo Watt RRT RPSGT   BMI: 32.2 Interpreting Physician: -   ESS: - Neck Circumference: -   Study Overview    DIAGNOSTIC TREATMENT   Lights Off: 09:13:13 PM Lights Off: 02:02:26 AM   Lights On: 02:02:26 AM Lights On: 04:59:52 AM   Time in Bed: 289.2 min. Time in Bed: 177.4 min.   Total Sleep Time: 240.5 min. Total Sleep Time: 161.0 min.   Sleep Efficiency: 83.2% Sleep Efficiency: 90.7%   Sleep Latency: 32.2 min. Sleep Latency: 6.5 min.   REM Latency from Sleep Onset: 77.0 " min. REM Latency from Sleep Onset: 84.5 min.   Wake After Sleep Onset: 16.5 min. Wake After Sleep Onset: 9.5 min.     DIAGNOSTIC TREATMENT    Count Index  Count Index   Awakenings: 11 2.7 Awakenings: 14 5.2   Arousals: 81 20.2 Arousals: 36 13.4   Apneas & Hypopneas: 71 17.7 Apneas & Hypopneas: 30 11.2    Limb Movements: - - Limb Movements: - -   Snores: - - Snores: - -   Desaturations: 86 21.5 Desaturations: 24 8.9   Minimum SpO2 TST: 88.0% Minimum SpO2 TST: 90.0%        Sleep Architecture     DIAGNOSTIC TREATMENT ENTIRE NIGHT   Stages Time (min) % TST Time (min) % TST Time (min) % TST   WAKE 49.0  16.5  65.5    Stage N1 18.5 7.7% 10.0 6.2% 28.5 7.1%   Stage N2 106.5 44.3% 107.5 66.8% 214.0 53.3%   Stage N3 58.0 24.1% 0.0 0.0% 58.0 14.4%   REM 57.5 23.9% 43.5 27.0% 101.0 25.2%       Arousal Summary     DIAGNOSTIC TREATMENT    NREM REM TST Index NREM REM TST Index   Respiratory Arousals 12 4 16 4.0 7 - 7 2.6   PLM Arousals - - - - - - - -   Isolated LM Arousals - - - - - - - -   Spontaneous Arousals 45 20 65 16.2 27 2 29 10.8   Total 57 24 81 20.2 34 2 36 13.4   Respiratory Summary    DIAGNOSTIC By Sleep Stage By Body Position Total    NREM REM Supine Non-Supine    Time (min) 183.0 57.5 44.5 196.0 240.5           Obstructive Apnea 1 - 1 - 1   Mixed Apnea - - - - -   Central Apnea 2 - - 2 2   Central Apnea Index 0.7 - - 0.9 0.7   Total Apneas 3 - 1 2 3   Total Apnea Index 1.0 - 1.3 0.6 0.7           Total Hypopnea 36 32 13 55 68   Total Hypopnea Index 11.8 33.4 17.5 16.8 17.0           Apnea & Hypopnea 39 32 14 57 71   Apnea & Hypopnea Index 12.8 33.4 18.9 17.4 17.7           RERAs - - - - -   RERA Index - - - - -           RDI 12.8 33.4 18.9 17.4 17.7     TREATMENT By Sleep Stage By Body Position Total    NREM REM Supine Non-Supine    Time (min) 117.5 43.5 49.5 111.5 161.0           Obstructive Apnea 2 - - 2 2   Mixed Apnea - - - - -   Central Apnea 2 1 1 2 3   Central Apnea Index 1.0 1.4 - 1.1 1.0   Total Apneas 4 1  1 4 5   Total Apnea Index 2.0 1.4 1.2 2.2 1.9           Total Hypopnea 25 - - 25 25   Total Hypopnea Index 12.8 - - 13.5 9.3           Apnea & Hypopnea 29 1 1 29 30   Apnea & Hypopnea Index 14.8 1.4 1.2 15.6 11.2           RERAs - - - - -   RERA Index - - - - -           RDI 14.8 1.4 1.2 15.6 11.2     Scoring Criteria: Hypopneas scored at 3% desaturation criteria.    Respiratory Event Durations     DIAGNOSTIC TREATMENT   Apnea NREM REM NREM REM   Average (seconds) 11.6 - 12.8 11.3   Maximum (seconds) 13.3 - 15.4 11.3   Hypopnea       Average (seconds) 21.2 28.2 17.0 -   Maximum (seconds) 71.0 81.3 36.9 -   Oxygen Saturation Summary     DIAGNOSTIC TREATMENT    Wake NREM REM TST Wake NREM REM TST   Average SpO2 92.8% 91.9% 92.2% 92.0% 93.6% 93.5% 94.0% 93.6%   Minimum SpO2 89.0% 88.0% 88.0% 88.0%  89.0% 90.0% 91.0% 90.0%    Maximum SpO2 97.0% 97.0% 97.0% 97.0%  98.0% 97.0% 97.0% 97.0%     DIAGNOSTIC   Oxygen Saturation Distribution    Range (%) Time in range (min) Time in range (%)   90.0 - 100.0 214.8 89.3%   80.0 - 90.0 25.8 10.7%   70.0 - 80.0 - -   60.0 - 70.0 - -   50.0 - 60.0 - -   0.0 - 50.0 - -   Time Spent ?88% SpO2    Range (%) Time in range (min) Time in range (%)   0.0 - 88.0 0.7 0.3%          Count Index   Desaturations 86 21.5      TREATMENT   Oxygen Saturation Distribution    Range (%) Time in range (min) Time in range (%)   90.0 - 100.0 176.2 99.3%   80.0 - 90.0 0.3 0.2%   70.0 - 80.0 - -   60.0 - 70.0 - -   50.0 - 60.0 - -   0.0 - 50.0 - -   Time Spent ?88% SpO2    Range (%) Time in range (min) Time in range (%)   0.0 - 88.0 - -      Count Index   Desaturations 24 8.9      Limb Movement Summary     DIAGNOSTIC TREATMENT    Count Index Count Index   Isolated Limb Movements - - - -   Periodic Limb Movements (PLMs) - - - -   Total Limb Movements - - - -     Cardiac Summary     DIAGNOSTIC TREATMENT    Wake NREM REM Total Wake NREM REM Total   Average IN (BPM) 76.6 70.5 72.0 71.8 66.6 63.3 68.2 64.8    Minimum KY (BPM) 63.0 60.0 64.0 60.0 59.0 58.0 59.0 58.0   Maximum KY (BPM) 101.0 93.0 89.0 101.0 86.0 76.0 82.0 86.0         Diagnostic EtCO2    Stage Min (mmHg) Average (mmHg) Max (mmHg)   Wake - - -   NREM(1+2+3) - - -   REM - - -       Range (mmHg) Time in range (min) Time in range (%)   - - -   - - -   - - -   - - -   - - -       TcCO2 Summary    DIAGNOSTIC    Stage Min (mmHg) Average (mmHg) Max (mmHg)   Wake - - -   NREM(1+2+3) - - -   REM - - -       Range (mmHg) Time in range (min) Time in range (%)   20.0 - 40.0 - -   40.0 - 50.0 - -   50.0 - 55.0 - -   55.0 - 100.0 - -   Excluded data <20.0 & >65.0 289.5 100.0%       TREATMENT    Stage Min (mmHg) Average (mmHg) Max (mmHg)   Wake - - -   NREM(1+2+3) - - -   REM - - -       Range (mmHg) Time in range (min) Time in range (%)   20.0 - 40.0 - -   40.0 - 50.0 - -   50.0 - 55.0 - -   55.0 - 100.0 - -   Excluded data <20.0 & >65.0 177.5 100.0%       Comments    -      Titration Summary    PAP Device PAP Level O2 Level Time (min) TST (min) NREM (min) REM (min) Wake (min) Sleep Eff% OA# CA# MA# Hyp# AHI RERA RDI Min SpO2 SpO2 ?88% (min) Ar. Index   - Off - 289.5 240.5 183.0 57.5 49.0 83.1% 1 2 - 68 17.7 - 17.7 88.0  0.7 20.2   CPAP 5 - 42.5 34.0 34.0 0.0 8.5 80.0% - 1 - 16 30.0 - 30.0 91.0  0.0 33.5   CPAP 7 - 9.0 8.0 8.0 0.0 1.0 88.9% 2 - - 7 67.5 - 67.5 91.0  0.0 45.0   CPAP 9 - 76.5 72.0 35.0 37.0 4.5 94.1% - 1 - 2 2.5 - 2.5 90.0  0.0 4.2   CPAP 10 - 49.5 47.0 40.5 6.5 2.5 94.9% - 1 - - 1.3 - 1.3 92.0  0.0 7.7

## 2024-08-18 PROCEDURE — 95811 POLYSOM 6/>YRS CPAP 4/> PARM: CPT | Mod: 26,,, | Performed by: INTERNAL MEDICINE

## 2024-08-19 ENCOUNTER — PATIENT MESSAGE (OUTPATIENT)
Dept: SLEEP MEDICINE | Facility: CLINIC | Age: 49
End: 2024-08-19
Payer: COMMERCIAL

## 2024-08-19 DIAGNOSIS — G47.33 OSA (OBSTRUCTIVE SLEEP APNEA): Primary | ICD-10-CM

## 2024-08-26 ENCOUNTER — CLINICAL SUPPORT (OUTPATIENT)
Dept: OPHTHALMOLOGY | Facility: CLINIC | Age: 49
End: 2024-08-26
Payer: COMMERCIAL

## 2024-08-26 ENCOUNTER — OFFICE VISIT (OUTPATIENT)
Dept: OPHTHALMOLOGY | Facility: CLINIC | Age: 49
End: 2024-08-26
Payer: COMMERCIAL

## 2024-08-26 DIAGNOSIS — H35.413 LATTICE DEGENERATION, BOTH EYES: Primary | ICD-10-CM

## 2024-08-26 DIAGNOSIS — H33.322 RETINA HOLE, LEFT: ICD-10-CM

## 2024-08-26 DIAGNOSIS — Q89.8 STICKLER'S SYNDROME: ICD-10-CM

## 2024-08-26 PROCEDURE — 1159F MED LIST DOCD IN RCRD: CPT | Mod: CPTII,S$GLB,, | Performed by: OPHTHALMOLOGY

## 2024-08-26 PROCEDURE — 99999 PR PBB SHADOW E&M-EST. PATIENT-LVL III: CPT | Mod: PBBFAC,,, | Performed by: OPHTHALMOLOGY

## 2024-08-26 PROCEDURE — 1160F RVW MEDS BY RX/DR IN RCRD: CPT | Mod: CPTII,S$GLB,, | Performed by: OPHTHALMOLOGY

## 2024-08-26 PROCEDURE — 92134 CPTRZ OPH DX IMG PST SGM RTA: CPT | Mod: S$GLB,,, | Performed by: OPHTHALMOLOGY

## 2024-08-26 PROCEDURE — 92004 COMPRE OPH EXAM NEW PT 1/>: CPT | Mod: S$GLB,,, | Performed by: OPHTHALMOLOGY

## 2024-08-26 PROCEDURE — 3044F HG A1C LEVEL LT 7.0%: CPT | Mod: CPTII,S$GLB,, | Performed by: OPHTHALMOLOGY

## 2024-08-26 PROCEDURE — 99999 PR PBB SHADOW E&M-EST. PATIENT-LVL I: CPT | Mod: PBBFAC,,,

## 2024-08-26 PROCEDURE — 92201 OPSCPY EXTND RTA DRAW UNI/BI: CPT | Mod: S$GLB,,, | Performed by: OPHTHALMOLOGY

## 2024-08-26 NOTE — PROGRESS NOTES
HPI     dfe      lattice  degeneration   ou    &  oct      Additional comments: Lattice degeneration   Oct    DFE   S/P RETINOPEXY   PCIOL    NEW FLOATER  IN OS  LARGER THEN NORMAL FLOATERS OU  X FEW WEEKS       NO   FLASH           Comments    DLS   08/16   BLURRED VA           Last edited by Sheila Carvalho on 8/26/2024 10:41 AM.     OCT - OD VMA  OS PVD         A/P    1. Stickler Syndrome  - family on Dad's side with same syndrome  - no RD today    2. Lattice Degeneration OU  - Laser retinopexy due to # 1  - S/p retinopexy OU   8/24 - small break a/w PVD in SN lattice within prior retinopexy    No RD, RD precautions discussed      3. PCIOL OU  - doing well      4. Enlarged C/D  - consider getting OCT ON next visit IOP Ok    5. PVD OS  No breaks or tears  Retinopexy holding      1 month Dilate OS only

## 2024-08-26 NOTE — PROGRESS NOTES
Oct macula done ou, per Dr. Shearer./MA        There are no diagnoses linked to this encounter.     49 y.o. y/o here for screening for Diabetic Renopathy with non-dilated fundus photos per Zoie Corey MD

## 2024-08-27 ENCOUNTER — OFFICE VISIT (OUTPATIENT)
Dept: OTOLARYNGOLOGY | Facility: CLINIC | Age: 49
End: 2024-08-27
Payer: COMMERCIAL

## 2024-08-27 ENCOUNTER — CLINICAL SUPPORT (OUTPATIENT)
Dept: AUDIOLOGY | Facility: CLINIC | Age: 49
End: 2024-08-27
Payer: COMMERCIAL

## 2024-08-27 DIAGNOSIS — H90.A31 MIXED CONDUCTIVE AND SENSORINEURAL HEARING LOSS OF RIGHT EAR WITH RESTRICTED HEARING OF LEFT EAR: Primary | ICD-10-CM

## 2024-08-27 DIAGNOSIS — H90.A22 SENSORINEURAL HEARING LOSS (SNHL) OF LEFT EAR WITH RESTRICTED HEARING OF RIGHT EAR: ICD-10-CM

## 2024-08-27 PROCEDURE — 99999 PR PBB SHADOW E&M-EST. PATIENT-LVL I: CPT | Mod: PBBFAC,,, | Performed by: AUDIOLOGIST

## 2024-08-27 PROCEDURE — 1159F MED LIST DOCD IN RCRD: CPT | Mod: CPTII,S$GLB,, | Performed by: NURSE PRACTITIONER

## 2024-08-27 PROCEDURE — 3044F HG A1C LEVEL LT 7.0%: CPT | Mod: CPTII,S$GLB,, | Performed by: NURSE PRACTITIONER

## 2024-08-27 PROCEDURE — 92567 TYMPANOMETRY: CPT | Mod: S$GLB,,, | Performed by: AUDIOLOGIST

## 2024-08-27 PROCEDURE — 92557 COMPREHENSIVE HEARING TEST: CPT | Mod: S$GLB,,, | Performed by: AUDIOLOGIST

## 2024-08-27 PROCEDURE — 99999 PR PBB SHADOW E&M-EST. PATIENT-LVL II: CPT | Mod: PBBFAC,,, | Performed by: NURSE PRACTITIONER

## 2024-08-27 PROCEDURE — 99203 OFFICE O/P NEW LOW 30 MIN: CPT | Mod: S$GLB,,, | Performed by: NURSE PRACTITIONER
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
## 2024-08-27 NOTE — PROGRESS NOTES
Audiologic Evaluation 8/27/2024:       Ryder Mendoza, a 49 y.o. male, was seen today in the clinic for an audiologic evaluation for hearing loss.  Mr. Mendoza reported decreased hearing of the right ear. He denied tinnitus, otalgia, and dizziness.      Tympanometry revealed Type B tympanogram with large ear canal volume in the right ear and Type Ad tympanogram in the left ear. Audiogram results revealed mild to moderately severe mixed hearing loss in the right ear and mild rising to normal and sloping to moderate sensorineural hearing loss  in the left ear.  Speech reception thresholds were noted at 45 dB in the right ear and 30 dB in the left ear.  Speech discrimination scores were 96% in the right ear and 100% in the left ear.    Recommendations:  Otologic evaluation  Hearing aid consultation with medical clearance  Annual audiogram  Hearing protection when in noise

## 2024-08-30 ENCOUNTER — OFFICE VISIT (OUTPATIENT)
Dept: OTOLARYNGOLOGY | Facility: CLINIC | Age: 49
End: 2024-08-30
Payer: COMMERCIAL

## 2024-08-30 DIAGNOSIS — H74.11: ICD-10-CM

## 2024-08-30 DIAGNOSIS — H90.A31 MIXED CONDUCTIVE AND SENSORINEURAL HEARING LOSS OF RIGHT EAR WITH RESTRICTED HEARING OF LEFT EAR: Primary | ICD-10-CM

## 2024-08-30 PROCEDURE — 99999 PR PBB SHADOW E&M-EST. PATIENT-LVL II: CPT | Mod: PBBFAC,,, | Performed by: OTOLARYNGOLOGY

## 2024-08-30 NOTE — PROGRESS NOTES
Subjective     Patient ID: Ryder Mendoza is a 49 y.o. male.    Chief Complaint: Follow-up    Follow-up        Ryder Mendoza is a 49 y.o. male presents for AD > AS HL with MHL of AD.  Hx of AD traumatic perf > 10 yrs ago. Has developed associated retracted TM on that side. No other sx other than HL. No prior surgery.    Review of Systems   Constitutional: Negative.    HENT:  Positive for hearing loss.    Eyes: Negative.    Cardiovascular: Negative.    Gastrointestinal: Negative.    Endocrine: Negative.    Genitourinary: Negative.    Musculoskeletal: Negative.    Integumentary:  Negative.   Allergic/Immunologic: Negative.    Neurological: Negative.    Hematological: Negative.    Psychiatric/Behavioral: Negative.            Objective     Physical Exam  Vitals and nursing note reviewed.   Constitutional:       Appearance: Normal appearance.   HENT:      Head: Normocephalic and atraumatic.      Right Ear: Ear canal and external ear normal. There is no impacted cerumen. Tympanic membrane is retracted (myringostapediopexy, with small fibrous incus bridge remaining. no contact of drum to promontory).      Left Ear: Tympanic membrane, ear canal and external ear normal. There is no impacted cerumen.   Neurological:      Mental Status: He is alert.       Data Reviewed:         Audiogram tracings independently reviewed and discussed with patient  shows right > left mixed hearing loss in moderate range left with mild-mod SNHL     Assessment and Plan     1. Mixed conductive and sensorineural hearing loss of right ear with restricted hearing of left ear    2. 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  Do not feel strongly about recommending surgery this point     Recommend hearing aid evaluation.  Patient medically cleared for hearing aids         No follow-ups on file.

## 2024-09-09 ENCOUNTER — PATIENT MESSAGE (OUTPATIENT)
Dept: OTOLARYNGOLOGY | Facility: CLINIC | Age: 49
End: 2024-09-09
Payer: COMMERCIAL

## 2024-09-25 ENCOUNTER — TELEPHONE (OUTPATIENT)
Dept: SLEEP MEDICINE | Facility: CLINIC | Age: 49
End: 2024-09-25
Payer: COMMERCIAL

## 2024-09-25 ENCOUNTER — PATIENT MESSAGE (OUTPATIENT)
Dept: SLEEP MEDICINE | Facility: CLINIC | Age: 49
End: 2024-09-25
Payer: COMMERCIAL

## 2024-09-25 ENCOUNTER — PATIENT MESSAGE (OUTPATIENT)
Dept: OPHTHALMOLOGY | Facility: CLINIC | Age: 49
End: 2024-09-25
Payer: COMMERCIAL

## 2024-09-25 ENCOUNTER — TELEPHONE (OUTPATIENT)
Dept: OPHTHALMOLOGY | Facility: CLINIC | Age: 49
End: 2024-09-25
Payer: COMMERCIAL

## 2024-09-25 NOTE — TELEPHONE ENCOUNTER
Spoke with patient to schedule him a follow up compliance visit with Lavern Garcia. Appointment set for December 3, 2024 at 4pm.

## 2024-09-26 ENCOUNTER — OFFICE VISIT (OUTPATIENT)
Dept: OPHTHALMOLOGY | Facility: CLINIC | Age: 49
End: 2024-09-26
Payer: COMMERCIAL

## 2024-09-26 DIAGNOSIS — H35.413 LATTICE DEGENERATION, BOTH EYES: Primary | ICD-10-CM

## 2024-09-26 DIAGNOSIS — H33.322 RETINA HOLE, LEFT: ICD-10-CM

## 2024-09-26 PROCEDURE — 99999 PR PBB SHADOW E&M-EST. PATIENT-LVL II: CPT | Mod: PBBFAC,,, | Performed by: OPHTHALMOLOGY

## 2024-09-26 NOTE — PROGRESS NOTES
HPI     1 month follow up       lattice degeneration      Additional comments: Lattice degeneration   Dfe  Floater in OS   x 6 to 8 weeks   no flash either eye   Stickler syndrome   S/p retinopexy    Ref   by Dr Mckeon   Dls  08/26/24          Last edited by Sheila Carvalho on 9/26/2024  1:39 PM.        OCT - OD VMA  OS PVD       A/P    1. Stickler Syndrome  - family on Dad's side with same syndrome  - no RD today    2. Lattice Degeneration OU  - Laser retinopexy due to # 1  - S/p retinopexy OU   8/24 - small break a/w PVD in SN lattice within prior retinopexy  9/24 - some extension through anterior laser SN and small HST near anterior laser margin at 12:00    Laser retinopexy OS today - will bolster both areas      3. PCIOL OU  - doing well      4. Enlarged C/D  - consider getting OCT ON next visit IOP Ok    5. PVD OS  No breaks or tears  Retinopexy holding      1 month Dilate OS only      Risks, benefits, and alternatives to treatment discussed in detail with the patient.  The patient voiced understanding and wished to proceed with the procedure    Laser Procedure Note  Dx: HST x2 OS  Laser: Retinopexy OS  Argon  Spot: JOÃO  Power: 150  Dur: 0.15 s  #:     Complications: None  F/U as above

## 2024-10-21 ENCOUNTER — CLINICAL SUPPORT (OUTPATIENT)
Dept: AUDIOLOGY | Facility: CLINIC | Age: 49
End: 2024-10-21
Payer: COMMERCIAL

## 2024-10-21 DIAGNOSIS — H90.A31 MIXED CONDUCTIVE AND SENSORINEURAL HEARING LOSS OF RIGHT EAR WITH RESTRICTED HEARING OF LEFT EAR: Primary | ICD-10-CM

## 2024-10-21 PROCEDURE — 99499 UNLISTED E&M SERVICE: CPT | Mod: S$GLB,,, | Performed by: AUDIOLOGIST

## 2024-10-21 NOTE — PROGRESS NOTES
Hearing Aid Consultation:    Ryder Mendoza was seen today for a hearing aid consultation. He reported difficulty understanding conversational speech when in social settings and at networking events for work. An audiologic evaluation on 8/27/2024 found mild to moderately severe mixed hearing loss in the right ear and mild rising to normal and sloping to moderate sensorineural hearing loss  in the left ear. Mr. Mendoza was counseled on the different styles and levels of hearing aid technology. Binaural advanced or premium level ALBERT hearing aids were recommended (Specifically phonak sphere). Mr. Mendoza was informed of the bundled package (with 10% employee family discount) and itemized packages. He was informed of the 30 day trial period, 3 year warranty, $250 non-refundable deposit, and that we cannot file with insurance. Mr. Mendoza is going to discuss his options with his wife, but will likely wait to pursue hearing aids in the new year using an HSA/FSA. He will contact the clinic when he is ready to order hearing aids.

## 2024-10-28 ENCOUNTER — CLINICAL SUPPORT (OUTPATIENT)
Dept: OPHTHALMOLOGY | Facility: CLINIC | Age: 49
End: 2024-10-28
Payer: COMMERCIAL

## 2024-10-28 ENCOUNTER — OFFICE VISIT (OUTPATIENT)
Dept: OPHTHALMOLOGY | Facility: CLINIC | Age: 49
End: 2024-10-28
Payer: COMMERCIAL

## 2024-10-28 DIAGNOSIS — H35.413 LATTICE DEGENERATION, BOTH EYES: Primary | ICD-10-CM

## 2024-10-28 DIAGNOSIS — Q89.8 STICKLER'S SYNDROME: ICD-10-CM

## 2024-10-28 DIAGNOSIS — H25.13 NUCLEAR SCLEROSIS OF BOTH EYES: ICD-10-CM

## 2024-10-28 DIAGNOSIS — H33.322 RETINA HOLE, LEFT: ICD-10-CM

## 2024-10-28 PROCEDURE — 1159F MED LIST DOCD IN RCRD: CPT | Mod: CPTII,S$GLB,, | Performed by: OPHTHALMOLOGY

## 2024-10-28 PROCEDURE — 3044F HG A1C LEVEL LT 7.0%: CPT | Mod: CPTII,S$GLB,, | Performed by: OPHTHALMOLOGY

## 2024-10-28 PROCEDURE — 1160F RVW MEDS BY RX/DR IN RCRD: CPT | Mod: CPTII,S$GLB,, | Performed by: OPHTHALMOLOGY

## 2024-10-28 PROCEDURE — 99999 PR PBB SHADOW E&M-EST. PATIENT-LVL III: CPT | Mod: PBBFAC,,, | Performed by: OPHTHALMOLOGY

## 2024-10-28 PROCEDURE — 92201 OPSCPY EXTND RTA DRAW UNI/BI: CPT | Mod: S$GLB,,, | Performed by: OPHTHALMOLOGY

## 2024-10-28 PROCEDURE — 92014 COMPRE OPH EXAM EST PT 1/>: CPT | Mod: S$GLB,,, | Performed by: OPHTHALMOLOGY

## 2024-12-03 ENCOUNTER — OFFICE VISIT (OUTPATIENT)
Dept: SLEEP MEDICINE | Facility: CLINIC | Age: 49
End: 2024-12-03
Payer: COMMERCIAL

## 2024-12-03 VITALS
DIASTOLIC BLOOD PRESSURE: 80 MMHG | WEIGHT: 251.31 LBS | BODY MASS INDEX: 32.25 KG/M2 | HEART RATE: 80 BPM | SYSTOLIC BLOOD PRESSURE: 116 MMHG | HEIGHT: 74 IN

## 2024-12-03 DIAGNOSIS — F51.09 OTHER INSOMNIA NOT DUE TO A SUBSTANCE OR KNOWN PHYSIOLOGICAL CONDITION: ICD-10-CM

## 2024-12-03 DIAGNOSIS — G47.33 OSA (OBSTRUCTIVE SLEEP APNEA): Primary | ICD-10-CM

## 2024-12-03 PROCEDURE — 99999 PR PBB SHADOW E&M-EST. PATIENT-LVL III: CPT | Mod: PBBFAC,,, | Performed by: PHYSICIAN ASSISTANT

## 2024-12-03 NOTE — PROGRESS NOTES
Referred by No ref. provider found     ESTABLISHED PATIENT VISIT    Ryder Mendoza  is a pleasant 49 y.o. male  with PMH significant for HLD, sticker's syndrome, SANYA, MDD, ARCENIO      Here today for: CPAP follow-up / compliance     PLAN last visit 7/16/24:   -recommend sleep testing   -PSG ordered  -discussed trial therapy if ARCENIO present and the patient is open to a trial of CPAP therapy  -discussed ARCENIO and PAP with patient in detail, including possible complications of untreated ARCENIO like heart attack/stroke  -advised on strict driving precautions; advised never to drive drowsy      Since last visit:   Reports using CPAP nightly with improvement in sx overall. Report she still feels anxiety is contributing to some sleep difficulties but feels more rested and sleeping better overall. Does endorse some occasional dryness. Has tried to increase humidity, which caused rainout. Otherwise reports mask interface and pressure settings comfortable. Sometimes has mild mask leak when side sleepiness. Otherwise no complaints. Presents today for compliance.       PAP history   Problems    Mask FFM (tolerating well)   Pressure 6-12cwp   DME HME   Machine age AS 11 9/24/24   Download 12/3/24: 28/30 x 6hrs 1mins, 9-12cxwp (9.4/10.4/11.1), leak (0.1/4.6/10.9), AHI 0.9         Past Medical History:   Diagnosis Date    Cataract     Diarrhea 2015    c-diff    Lattice degeneration, both eyes     MRSA (methicillin resistant staph aureus) culture positive     Stickler's syndrome      Patient Active Problem List   Diagnosis    Stickler's syndrome    Lattice degeneration, both eyes    Nuclear sclerosis of both eyes    Nuclear sclerosis    Nuclear sclerotic cataract of right eye    Poor concentration    SANYA (generalized anxiety disorder)    Mild episode of recurrent major depressive disorder    Other hyperlipidemia    Obesity (BMI 30.0-34.9)    Dysfunction of left rotator cuff    Scapular dysfunction    Witnessed episode of apnea    Retina  hole, left       Current Outpatient Medications:     buPROPion (WELLBUTRIN XL) 300 MG 24 hr tablet, Take 1 tablet (300 mg total) by mouth once daily., Disp: 90 tablet, Rfl: 3     There were no vitals filed for this visit.    Physical Exam:    GEN:   Well-appearing  Psych:  Appropriate affect, demonstrates insight  SKIN:  No rash on the face or bridge of the nose      LABS:   Lab Results   Component Value Date    HGB 14.7 06/13/2024    CO2 24 06/13/2024         RECORDS REVIEWED:    SPLIT 8/5/24:  AHI 17.7, mask: m simplus  Rec: min 9 (push to 10), max 12  Min Ramp: quick to 9    Previous sleep note: 7/16/24    CPAP interrogation 12/3/24: 28/30 x 6hrs 1mins, 9-12cxwp (9.4/10.4/11.1), leak (0.1/4.6/10.9), AHI 0.9    ASSESSMENT    PROBLEM DESCRIPTION/ Sx on Presentation Interval Hx STATUS   Hx ARCENIO   + snoring, + witnessed apneas   Good usage and efficiency  controlled   Daytime Sx   + sleepiness when inactive   ESS n/a/24 on intake Does report a little improvement in fatigue overall, but still sleepy on occasion improved   Insomnia   Trouble falling asleep: not usually difficult  Arousals:         2 x nightly  Hard to get back to sleep?: not usually difficult    Prior pertinent medications:  Current pertinent medications:  Sleep is more consolidated overall on CPAP, still has some sleep difficulties on occasional 2/2 to anxiety  Slightly improved   Nocturia   x 1-2 per sleep period 0 x nightly improved   Other issues:       PLAN      -using and benefiting from CPAP therapy  -continue CPAP nightly  -CPAP supplies ordered  -recommended to ask HME about climate controlled line to see if this helps with dryness  -recommended trial of CPAP pillow to see if this helps with comfort when side sleeping  -discussed ARCENIO and PAP with patient in detail, including possible complications of untreated ARCENIO like heart attack/stroke  -advised on strict driving precautions; advised never to drive drowsy    Advised on plan of care. Answered  all patient questions. Patient verbalized understanding and voiced agreement with plan of care.     RTC  12 months or as needed      The patient was given open opportunity to ask questions and/or express concerns about treatment plan. All questions/concerns were discussed.     Two patient identifiers used prior to evaluation.

## 2024-12-05 ENCOUNTER — PATIENT MESSAGE (OUTPATIENT)
Dept: SLEEP MEDICINE | Facility: CLINIC | Age: 49
End: 2024-12-05
Payer: COMMERCIAL

## 2025-01-03 ENCOUNTER — PATIENT MESSAGE (OUTPATIENT)
Dept: AUDIOLOGY | Facility: CLINIC | Age: 50
End: 2025-01-03
Payer: COMMERCIAL

## 2025-01-22 ENCOUNTER — PATIENT MESSAGE (OUTPATIENT)
Dept: AUDIOLOGY | Facility: CLINIC | Age: 50
End: 2025-01-22
Payer: COMMERCIAL

## 2025-01-28 ENCOUNTER — CLINICAL SUPPORT (OUTPATIENT)
Dept: AUDIOLOGY | Facility: CLINIC | Age: 50
End: 2025-01-28
Payer: COMMERCIAL

## 2025-01-28 DIAGNOSIS — H90.A31 MIXED CONDUCTIVE AND SENSORINEURAL HEARING LOSS OF RIGHT EAR WITH RESTRICTED HEARING OF LEFT EAR: Primary | ICD-10-CM

## 2025-01-28 NOTE — PROGRESS NOTES
Hearing Aid Consultation:    Mr. Mendoza was seen today for a follow-up hearing aid consultation. He reported difficulty understanding conversational speech when in social settings and at networking events for work. An audiologic evaluation on 8/27/2024 found mild to moderately severe mixed hearing loss in the right ear and mild rising to normal and sloping to moderate sensorineural hearing loss in the left ear. Mr. Mendoza was counseled on the different styles and levels of hearing aid technology and the differences between the itemized and bundled service packages. Mr. Mendoza's wife is an [employer] employee so Mr. Mendoza is eligible for 10% discount on the bundled package. Mr. Mendoza opted to move forward with purchasing binaural Phonak Infinio Sphere 70 ALBERT R hearing aids with the itemized package. He was informed of the 30 day trial period, 3 year warranty, $250 non-refundable deposit, and that we cannot file with insurance. Mr. Mendoza will follow-up when his hearing aids are received in clinic.

## 2025-02-19 ENCOUNTER — CLINICAL SUPPORT (OUTPATIENT)
Dept: AUDIOLOGY | Facility: CLINIC | Age: 50
End: 2025-02-19
Payer: COMMERCIAL

## 2025-02-19 DIAGNOSIS — H90.A31 MIXED CONDUCTIVE AND SENSORINEURAL HEARING LOSS OF RIGHT EAR WITH RESTRICTED HEARING OF LEFT EAR: Primary | ICD-10-CM

## 2025-02-20 NOTE — PROGRESS NOTES
Hearing Aid Evaluation:    Ryder Mendoza was seen today for a hearing aid evaluation to be fit with binaural Phonak Infinio Sphere 70 ALBERT R hearing aids. Mr. Mendoza is a first time hearing aid user. His hearing aids were connected to the Phonak fitting software and were programmed to 90% target gain based on his most recent audiogram. Feedback analyzer was run with good outcomes. He reported comfortable and clear sound quality. Mr. Gamboa demonstrated understanding of proper insertion/removal, recharging, and care/maintenance. The hearing aids were paired to his iphone for streaming and for use with the siXis nalini. He expressed understanding of the 30 day trial period, 3 year warranty, $250 non-refundable deposit, and included services with the itemized package. He will return for f/u in 2 weeks.       Invoice Date: 1/28/2025  Phonak [id number]  Albina Beige  RT SN: [id number]  LT SN: [id number]   6.0: M1  Dome: Medium Vented  ChargerGo SN: [id number]  Warranty Exp: 02/26/2028

## 2025-02-28 ENCOUNTER — TELEPHONE (OUTPATIENT)
Dept: INTERNAL MEDICINE | Facility: CLINIC | Age: 50
End: 2025-02-28

## 2025-02-28 ENCOUNTER — CLINICAL SUPPORT (OUTPATIENT)
Dept: AUDIOLOGY | Facility: CLINIC | Age: 50
End: 2025-02-28
Payer: COMMERCIAL

## 2025-02-28 DIAGNOSIS — H90.A31 MIXED CONDUCTIVE AND SENSORINEURAL HEARING LOSS OF RIGHT EAR WITH RESTRICTED HEARING OF LEFT EAR: Primary | ICD-10-CM

## 2025-02-28 NOTE — PROGRESS NOTES
Hearing Aid Follow-up:    Mr. Mendoza was seen today for a hearing aid follow-up appointment. He reported his new hearing aids are working well. He feels that his  wires are too tight. The right  was switched to a 2M  from office stock. There was not a 2L  in stock, so an order will be placed for one with Phonak. Mr. Mendoza's hearing aids were connected to the Phonak fitting software and overall gain was increased to 100%. The settings were verified using real ear speech-mapping with good results. Mr. Mendoza was shown how to clean his hearing aids and change his wax filters. He will return for f/u in 1 month or sooner if needed.        Invoice Date: 1/28/2025  Phonak [id number]  Sand Beige  RT SN: [id number]  LT SN: [id number]   6.0: M1  Domlizzette: Medium Vented  ChargerGo SN: [id number]  Warranty Exp: 02/26/2028

## 2025-03-03 ENCOUNTER — RESULTS FOLLOW-UP (OUTPATIENT)
Dept: INTERNAL MEDICINE | Facility: CLINIC | Age: 50
End: 2025-03-03

## 2025-03-03 ENCOUNTER — HOSPITAL ENCOUNTER (OUTPATIENT)
Dept: RADIOLOGY | Facility: OTHER | Age: 50
Discharge: HOME | End: 2025-03-03
Attending: INTERNAL MEDICINE
Payer: COMMERCIAL

## 2025-03-03 ENCOUNTER — OFFICE VISIT (OUTPATIENT)
Dept: INTERNAL MEDICINE | Facility: CLINIC | Age: 50
End: 2025-03-03
Attending: INTERNAL MEDICINE
Payer: COMMERCIAL

## 2025-03-03 VITALS
HEART RATE: 68 BPM | DIASTOLIC BLOOD PRESSURE: 96 MMHG | SYSTOLIC BLOOD PRESSURE: 118 MMHG | OXYGEN SATURATION: 96 % | WEIGHT: 261.69 LBS | HEIGHT: 74 IN | TEMPERATURE: 97 F | RESPIRATION RATE: 18 BRPM | BODY MASS INDEX: 33.58 KG/M2

## 2025-03-03 DIAGNOSIS — M25.511 CHRONIC RIGHT SHOULDER PAIN: ICD-10-CM

## 2025-03-03 DIAGNOSIS — M75.21 BICIPITAL TENDONITIS OF RIGHT SHOULDER: ICD-10-CM

## 2025-03-03 DIAGNOSIS — G89.29 CHRONIC RIGHT SHOULDER PAIN: ICD-10-CM

## 2025-03-03 DIAGNOSIS — G89.29 CHRONIC RIGHT SHOULDER PAIN: Primary | ICD-10-CM

## 2025-03-03 DIAGNOSIS — M25.511 CHRONIC RIGHT SHOULDER PAIN: Primary | ICD-10-CM

## 2025-03-03 PROCEDURE — 1160F RVW MEDS BY RX/DR IN RCRD: CPT | Mod: CPTII,S$GLB,, | Performed by: INTERNAL MEDICINE

## 2025-03-03 PROCEDURE — 3074F SYST BP LT 130 MM HG: CPT | Mod: CPTII,S$GLB,, | Performed by: INTERNAL MEDICINE

## 2025-03-03 PROCEDURE — 99999 PR PBB SHADOW E&M-EST. PATIENT-LVL IV: CPT | Mod: PBBFAC,,, | Performed by: INTERNAL MEDICINE

## 2025-03-03 PROCEDURE — 73030 X-RAY EXAM OF SHOULDER: CPT | Mod: TC,FY,RT

## 2025-03-03 PROCEDURE — 3080F DIAST BP >= 90 MM HG: CPT | Mod: CPTII,S$GLB,, | Performed by: INTERNAL MEDICINE

## 2025-03-03 PROCEDURE — 99214 OFFICE O/P EST MOD 30 MIN: CPT | Mod: S$GLB,,, | Performed by: INTERNAL MEDICINE

## 2025-03-03 PROCEDURE — 73030 X-RAY EXAM OF SHOULDER: CPT | Mod: 26,RT,, | Performed by: RADIOLOGY

## 2025-03-03 PROCEDURE — 1159F MED LIST DOCD IN RCRD: CPT | Mod: CPTII,S$GLB,, | Performed by: INTERNAL MEDICINE

## 2025-03-03 PROCEDURE — 3008F BODY MASS INDEX DOCD: CPT | Mod: CPTII,S$GLB,, | Performed by: INTERNAL MEDICINE

## 2025-03-03 RX ORDER — METHYLPREDNISOLONE 4 MG/1
TABLET ORAL
Qty: 21 EACH | Refills: 0 | Status: SHIPPED | OUTPATIENT
Start: 2025-03-03 | End: 2025-03-24

## 2025-03-03 RX ORDER — MELOXICAM 15 MG/1
15 TABLET ORAL DAILY
Qty: 30 TABLET | Refills: 2 | Status: SHIPPED | OUTPATIENT
Start: 2025-03-03

## 2025-03-03 NOTE — PROGRESS NOTES
"Subjective:   Patient ID: Ryder Mendoza is a 50 y.o. male  Chief complaint:   Chief Complaint   Patient presents with    Follow-up     E visit follow up    Shoulder Pain       HPI  Here for utc appt for shoulder pain   Utd annual exam 6/2024    Hre for right arm pain   Chronic pain present over several months   Located at top of shoulder, down arm to elbow   Was occurring after tennis and intermittent and now since nov more daily chronic   Stopped playing tennis and gym 2/2 to discomfort   No specific injury or inciting event   No numbness or weakness  Pain with elevation - abduction   Less rom with extension posteriorly  + pain with taking off shirt  No popping or clicking in joint  Pain cna also occur iwht:   Sitting at desk and sleeping on right side as well as resting on left side    Tried:   Resting   Ice/heat   Ibuprofen - helpful but not resolved   Top Voltaren    Previously:   Had LEFT shoulder pain as below:  Followed by pain mgmt fo rneck pain and s/p TPI 4/2024  Plantar fasciitis - seen by pod Anayelier   Calcific tendonitis of left shoulder: attended PT and ofllowed by sports med    Review of Systems    Objective:  Vitals:    03/03/25 1259   BP: (!) 118/96   BP Location: Left arm   Patient Position: Sitting   Pulse: 68   Resp: 18   Temp: 97.2 °F (36.2 °C)   TempSrc: Oral   SpO2: 96%   Weight: 118.7 kg (261 lb 11 oz)   Height: 6' 2" (1.88 m)     Body mass index is 33.6 kg/m².    Physical Exam  Vitals reviewed.   Constitutional:       Appearance: Normal appearance. He is well-developed.   HENT:      Head: Normocephalic and atraumatic.   Neck:      Thyroid: No thyromegaly.   Musculoskeletal:         General: Tenderness present. No swelling.      Cervical back: Neck supple.      Comments: FROM but pain with abduction and posterior extension   Pain at insertion of right biceps tendon and deltoid  No popping/clicks    strength symmetric and UE strength 5/5  No spinal ttp or post shoulder ttp  "   Lymphadenopathy:      Cervical: No cervical adenopathy.   Skin:     General: Skin is warm and dry.      Capillary Refill: Capillary refill takes less than 2 seconds.   Neurological:      General: No focal deficit present.      Mental Status: He is alert and oriented to person, place, and time.   Psychiatric:         Behavior: Behavior normal.         Thought Content: Thought content normal.         Judgment: Judgment normal.     Assessment:  1. Chronic right shoulder pain    2. Bicipital tendonitis of right shoulder        Plan:  Ryder was seen today for follow-up and shoulder pain.    Diagnoses and all orders for this visit:    Chronic right shoulder pain  -     methylPREDNISolone (MEDROL DOSEPACK) 4 mg tablet; use as directed  -     X-ray Shoulder 2 or More Views Right; Future  -     Ambulatory Referral/Consult to Physical Therapy/Occupational Therapy; Future  -     meloxicam (MOBIC) 15 MG tablet; Take 1 tablet (15 mg total) by mouth once daily. Take with food or pepcid otc    Bicipital tendonitis of right shoulder    Suspect frozen shoulder contributing   Medrol dose pack   Understands to not take nsaids with oral steroids   Will start mobic in 1-2 weeks if still needed   Start trial of PT   Xray today   If sx worsen or do not improve will get MRI to better assess for soft tissue injury     Health Maintenance   Topic Date Due    COVID-19 Vaccine (4 - 2024-25 season) 09/01/2024    Diabetic Eye Exam  10/28/2025    TETANUS VACCINE  12/02/2026    Hemoglobin A1c (Diabetic Prevention Screening)  06/13/2027    Lipid Panel  06/13/2029    Colorectal Cancer Screening  05/23/2033    RSV Vaccine (Age 60+ and Pregnant patients) (1 - 1-dose 75+ series) 01/02/2052    Hepatitis C Screening  Completed    Influenza Vaccine  Completed    HIV Screening  Completed    Pneumococcal Vaccines (Age 0-49)  Aged Out

## 2025-03-05 ENCOUNTER — PATIENT MESSAGE (OUTPATIENT)
Dept: AUDIOLOGY | Facility: CLINIC | Age: 50
End: 2025-03-05
Payer: COMMERCIAL

## 2025-03-10 ENCOUNTER — PATIENT MESSAGE (OUTPATIENT)
Dept: SLEEP MEDICINE | Facility: CLINIC | Age: 50
End: 2025-03-10
Payer: COMMERCIAL

## 2025-03-18 ENCOUNTER — CLINICAL SUPPORT (OUTPATIENT)
Dept: REHABILITATION | Facility: OTHER | Age: 50
End: 2025-03-18
Attending: INTERNAL MEDICINE
Payer: COMMERCIAL

## 2025-03-18 DIAGNOSIS — R29.3 POSTURE IMBALANCE: Primary | ICD-10-CM

## 2025-03-18 DIAGNOSIS — M25.511 CHRONIC RIGHT SHOULDER PAIN: ICD-10-CM

## 2025-03-18 DIAGNOSIS — G89.29 CHRONIC RIGHT SHOULDER PAIN: ICD-10-CM

## 2025-03-18 PROBLEM — M89.9 SCAPULAR DYSFUNCTION: Status: RESOLVED | Noted: 2023-01-16 | Resolved: 2025-03-18

## 2025-03-18 PROBLEM — M67.912 DYSFUNCTION OF LEFT ROTATOR CUFF: Status: RESOLVED | Noted: 2023-01-16 | Resolved: 2025-03-18

## 2025-03-18 PROCEDURE — 97530 THERAPEUTIC ACTIVITIES: CPT | Mod: PN | Performed by: PHYSICAL THERAPIST

## 2025-03-18 PROCEDURE — 97162 PT EVAL MOD COMPLEX 30 MIN: CPT | Mod: PN | Performed by: PHYSICAL THERAPIST

## 2025-03-18 NOTE — PROGRESS NOTES
[facility]   Physical Therapy Initial Evaluation      Name: Ryder Mendoza  Clinic Number: [id number]    Therapy Diagnosis:   Encounter Diagnoses   Name Primary?    Chronic right shoulder pain     Posture imbalance Yes        Physician: Zoie Corey MD    Physician Orders: PT Eval and Treat   Medical Diagnosis from Referral: M25.511,G89.29 (ICD-10-CM) - Chronic right shoulder pain  Evaluation Date: 3/18/2025  Authorization Period Expiration: pending  Plan of Care Expiration: 5/13/2025  Progress Note Due: 4/13/2025  Visit # / Visits authorized: 1/ pending   FOTO: 1/ 3    Precautions: Standard     Time In: 1625  Time Out: 1715  Total Billable Time: 15 minutes    Subjective     Date of onset: chronic, exacerbated over the past 4-6 months    History of current condition - Ryder reports: he's had some pain to his R shoulder for the past few years, used to just bother him with playing tennis but over the past 4-6 months it's become more constant. He hasn't played tennis since Thanksgiving to give it a rest. He saw MD and was given Medrol dose pack. Prior to steroids pain was to shoulder and radiated down to elbow. Since steroids he said acute pain has resolved, and pain is more to lateral arm and and close to elbow. Pain is mostly with reaching up to the side, and reaching back. He says pain was limiting motion, but that has improved a lot.     Falls: no    Imaging: bone scan films: FINDINGS:  No acute fracture or dislocation seen.  Osteophyte formation glenohumeral joint without significant joint space narrowing.  No soft tissue edema or radiopaque retained foreign body.     Impression:     No acute osseous abnormality seen.    Prior Therapy: none for c/c  Social History: Pt lives with their family  Occupation: non-profit, computer work   Prior Level of Function: I with ADL's and driving. Tennis and golf, biking  Current Level of Function: I with ADL's and driving. Was having  difficulty with upper body dressing, but improved with steroids. Has not played tennis or golf since Thanksgiving.     Pain:  Current 0/10, worst 6/10, best 0/10   Location: R lateral shoulder and upper arm to elbow   Description: Aching at rest, sharp with certain motions   Aggravating Factors: reaching out to the side, reaching back. Was having pain with upper body dressing and computer work, but improved since steroids  Easing Factors: oral steroids, ice, ibuprofen     Patients goals: return to tennis and golf      Medical History:   Past Medical History:   Diagnosis Date    Cataract     Diarrhea 2015    c-diff    Lattice degeneration, both eyes     MRSA (methicillin resistant staph aureus) culture positive     Stickler's syndrome        Surgical History:   Ryder Mendoza  has a past surgical history that includes Appendectomy; Abdominal surgery; Cataract extraction w/  intraocular lens implant (Left, 06/27/2016); Cataract extraction w/  intraocular lens implant (Right, 07/18/2016); Eye surgery (June 2016); Hernia repair (January 2017); Vasectomy (November 2019); and Colonoscopy (N/A, 5/23/2023).    Medications:   Ryder has a current medication list which includes the following prescription(s): bupropion, meloxicam, and methylprednisolone.    Allergies:   Review of patient's allergies indicates:  No Known Allergies     Objective      Observation: Pt is alert and oriented, good historian.     Posture: upper crossed, forward rounded shoulders R>L      Passive Range of Motion:   Shoulder Left Right   Flexion 180 160 (limited by lat tightness)   Abduction 160 120 (ERP)   ER at 90 100 100   IR 70 60          Apley's Scratch Test   Left Right   Combined ER T2 T1   Combined IR T12 L3 (end range tightness)   Cross-body reach Post opp shoulder Post opp shoulder       Upper Extremity Strength   (L) UE (R) UE   Shoulder flexion: 5/5 4+/5   Shoulder Abduction: 5/5 4+/5   Shoulder ER 5/5 5/5   Shoulder IR 5/5 5/5   Lower  "Trap 4-/5 Limited by pain   Upper Trap 4+/5 4+/5   Rhomboids 4/5 4-/5   Elbow flexion: 5/5 5/5   Elbow extension: 5/5 5/5             Special Tests:   Left Right   Empty Can Test - -   Hawkin's Jorge - -   Neer's Test - -         Joint Mobility: posterior capsular tightness R>L    Palpation: no significant tenderness to palpation      Flexibility: restrictions to R>L latissimus dorsi, pec major/minor        Limitation/Restriction for FOTO Shoulder Survey    Therapist reviewed FOTO scores for Ryder Mendoza on 3/18/2025.   FOTO documents entered into [employer] - see Media section.    Intake Score: 68%    Goal: 75%         Treatment     Total Treatment time (time-based codes) separate from Evaluation: 15 minutes      Ryder received the treatments listed below:      therapeutic activities to improve functional performance for 15  minutes, including:  Pt education including role of posture and desk ergonomics in shoulder health. Development, demonstration, and review of home exercise program to include:   Wall slide reach/roll/lift 2 x 10   Door frame lat stretch 3 x 30"   Uni pec stretch 3 x 30"        Patient Education and Home Exercises     Education provided:   - therapy rationale and plan of care    Written Home Exercises Provided: yes. Exercises were reviewed and Ryder was able to demonstrate them prior to the end of the session.  Ryder demonstrated good  understanding of the education provided. See EMR under Patient Instructions for exercises provided during therapy sessions.    Assessment     Ryder is a 50 y.o. male referred to outpatient Physical Therapy with a medical diagnosis of M25.511,G89.29 (ICD-10-CM) - Chronic right shoulder pain. Patient presents with recent exacerbation of chronic R shoulder pain. Pt reports significant improvement after recent course of oral steroids. He continues with limited end range elevation (abd>flexion) and internal rotation. Weakness to periscapular mm R>L, and discomfort " with resisted flexion and abduction to R shoulder with MMT. Flexibility limitations noted to R>L latissimus dorsi and pec major/minor. Pt will benefit from skilled intervention to return to PLOF including tennis and golf.     Patient prognosis is Good.   Patient will benefit from skilled outpatient Physical Therapy to address the deficits stated above and in the chart below, provide patient /family education, and to maximize patientt's level of independence.     Plan of care discussed with patient: Yes  Patient's spiritual, cultural and educational needs considered and patient is agreeable to the plan of care and goals as stated below:     Anticipated Barriers for therapy: standard    Medical Necessity is demonstrated by the following  History  Co-morbidities and personal factors that may impact the plan of care [] LOW: no personal factors / co-morbidities  [x] MODERATE: 1-2 personal factors / co-morbidities  [] HIGH: 3+ personal factors / co-morbidities    Moderate / High Support Documentation:   Co-morbidities affecting plan of care: hx cataracts    Personal Factors:   lifestyle     Examination  Body Structures and Functions, activity limitations and participation restrictions that may impact the plan of care [] LOW: addressing 1-2 elements  [x] MODERATE: 3+ elements  [] HIGH: 4+ elements (please support below)    Moderate / High Support Documentation: ROM, weakness, limited with recreation     Clinical Presentation [] LOW: stable  [x] MODERATE: Evolving  [] HIGH: Unstable     Decision Making/ Complexity Score: moderate       Goals:  Short Term Goals (4 Weeks):   1. Pt to demonstrate improved PROM by 10% to allow pt to perform self care activities with increased ease.  2. Pt to demonstrate improved flexibility by a half grade to allow improved ADLs with increased ease.   3. Pt will report <3/10 pain at worst within the R shoulder for ease with computer use.  4. Pt will report being independent with his/her HEP for  maintenance of improvements gained during therapy sessions   .    Long Term Goals (8 Weeks):   1. Pt to demonstrate improved ROM to WNL, R=L, to allow pt to perform self care with increased ease.  2. Pt to demonstrate improved flexibility by a full grade to allow improved postural alignment with increased ease.  3. Pt will demonstrate >=4+/5 strength within the R shoulder for ease with return to tennis and golf.   4. Pt to demonstrate improved functional ability with FOTO score >=65% .  5. Pt independent with HEP and demonstrates good return technique.    Plan     Plan of care Certification: 3/18/2025 to 5/13/2025.    Outpatient Physical Therapy 2 times weekly for 8 weeks to include the following interventions: Electrical Stimulation unattended, Iontophoresis (with dexamethasone PRN), Manual Therapy, Moist Heat/ Ice, Neuromuscular Re-ed, Patient Education, Self Care, Therapeutic Activities, and Therapeutic Exercise.     Radha Camara, PT, DPT       Physician's Signature: _________________________________________ Date: ________________

## 2025-03-24 ENCOUNTER — CLINICAL SUPPORT (OUTPATIENT)
Dept: AUDIOLOGY | Facility: CLINIC | Age: 50
End: 2025-03-24
Payer: COMMERCIAL

## 2025-03-24 DIAGNOSIS — H90.A31 MIXED CONDUCTIVE AND SENSORINEURAL HEARING LOSS OF RIGHT EAR WITH RESTRICTED HEARING OF LEFT EAR: Primary | ICD-10-CM

## 2025-03-24 PROCEDURE — 99499 UNLISTED E&M SERVICE: CPT | Mod: S$GLB,,, | Performed by: AUDIOLOGIST

## 2025-03-24 NOTE — PROGRESS NOTES
Hearing Aid Follow-up:    Ryder Mendoza was seen today for a hearing aid follow-up appointment. He reported he is doing well with his hearing aids. Mr. Mendoza declined additional programming adjustments. He noted the hearing aids seem to work out of his ears. His left  was replaced with a 2M  and sport locks were added. Mr. Mendoza's hearing aids were cleaned and checked. A listening check confirmed both hearing aids to be in good working condition following cleaning. Mr. Mendoza will return for his annual HAFU or sooner if needed.         Itemized:  Invoice Date: 1/28/2025  Phonak [id number]  Sand Beige  RT SN: [id number]  LT SN: [id number]   6.0: M2  Dome: Medium Vented  ChargerGo SN: [id number]  Warranty Exp: 02/26/2028

## 2025-03-25 ENCOUNTER — CLINICAL SUPPORT (OUTPATIENT)
Dept: REHABILITATION | Facility: OTHER | Age: 50
End: 2025-03-25
Payer: COMMERCIAL

## 2025-03-25 DIAGNOSIS — G89.29 CHRONIC RIGHT SHOULDER PAIN: Primary | ICD-10-CM

## 2025-03-25 DIAGNOSIS — M25.511 CHRONIC RIGHT SHOULDER PAIN: Primary | ICD-10-CM

## 2025-03-25 DIAGNOSIS — R29.3 POSTURE IMBALANCE: ICD-10-CM

## 2025-03-25 PROCEDURE — 97140 MANUAL THERAPY 1/> REGIONS: CPT | Mod: PN

## 2025-03-25 PROCEDURE — 97112 NEUROMUSCULAR REEDUCATION: CPT | Mod: PN

## 2025-03-25 PROCEDURE — 97110 THERAPEUTIC EXERCISES: CPT | Mod: PN

## 2025-03-25 NOTE — PROGRESS NOTES
"[facility]   Physical Therapy Treatment Note      Name: Ryder Mendoza  Clinic Number: [id number]    Therapy Diagnosis:   Encounter Diagnoses   Name Primary?    Chronic right shoulder pain Yes    Posture imbalance      Physician: Zoie Corey MD    Visit Date: 3/25/2025    Physician Orders: PT Eval and Treat   Medical Diagnosis from Referral: M25.511,G89.29 (ICD-10-CM) - Chronic right shoulder pain  Evaluation Date: 3/18/2025  Authorization Period Expiration: pending  Plan of Care Expiration: 5/13/2025  Progress Note Due: 4/13/2025  Visit # / Visits authorized: 1/ pending   FOTO: 1/ 3     Precautions: Standard      Time In: 4:00p  Time Out: 5:00p  Total Billable Time: 60 minutes    PTA Visit #: 0/5       Subjective     Patient reports: consistent with HEP, cont to have pain with reaching OH and behind the back.  He was compliant with home exercise program.  Response to previous treatment: fair  Functional change: none    Pain: 1/10  Location: R lateral shoulder and upper arm to elbow    Objective      Objective Measures updated at progress report unless specified.     Treatment     Ryder received the treatments listed below:      therapeutic exercises to develop strength, endurance, ROM, flexibility, posture, and core stabilization for 10 minutes including:  +R SL sleeper stretch x 5x10" holds  +self inf/lat GHJ glide x 5x10"  Door frame lat stretch 3 x 30" - reviewed for HEP  Uni pec stretch 3 x 30" - reviewed for HEP    manual therapy techniques: Joint mobilizations, Manual traction, Myofacial release, and Soft tissue Mobilization were applied to the: R shoulder for 10 minutes, including:  GHJ inf/ AP glides, C/R stretching into 90-90 IR with manual OP    neuromuscular re-education activities to improve: Balance, Coordination, Kinesthetic, Sense, Proprioception, and Posture for 32 minutes. The following activities were included:  Self Inf glide x10x10"  Prone EOM    I x2x10 " "x 5" holds   T x2x10 x 5" holds   Y x2x10 x 5" holds    therapeutic activities to improve functional performance for 8  minutes, including:  Wall slide reach/roll/lift 2 x 10 - reviewed for HEP        Patient Education and Home Exercises       Education provided:   - none today    Written Home Exercises Provided: Pt instructed to continue prior HEP. Exercises were reviewed and Ryder was able to demonstrate them prior to the end of the session.  Ryder demonstrated good  understanding of the education provided. See Electronic Medical Record under Patient Instructions for exercises provided during therapy sessions    Assessment     Reviewed HEP for understanding and technique; pt with good return technique indicating good improvements in compliance, understanding. Progressed flexibility exercises and joint mobs with good tolerance and improved functional IR and OH elevation with less pain, but continues to have pain and stiffness with functional IR. Initiated prone scap strengthening within tolerable ROM with appropriate training effect noted.    Ryder Is progressing well towards his goals.   Patient prognosis is Good.     Patient will continue to benefit from skilled outpatient physical therapy to address the deficits listed in the problem list box on initial evaluation, provide pt/family education and to maximize pt's level of independence in the home and community environment.     Patient's spiritual, cultural and educational needs considered and pt agreeable to plan of care and goals.     Anticipated barriers to physical therapy: standard    Goals:   Short Term Goals (4 Weeks):   1. Pt to demonstrate improved PROM by 10% to allow pt to perform self care activities with increased ease.  2. Pt to demonstrate improved flexibility by a half grade to allow improved ADLs with increased ease.   3. Pt will report <3/10 pain at worst within the R shoulder for ease with computer use.  4. Pt will report being independent " with his/her HEP for maintenance of improvements gained during therapy sessions   .     Long Term Goals (8 Weeks):   1. Pt to demonstrate improved ROM to WNL, R=L, to allow pt to perform self care with increased ease.  2. Pt to demonstrate improved flexibility by a full grade to allow improved postural alignment with increased ease.  3. Pt will demonstrate >=4+/5 strength within the R shoulder for ease with return to tennis and golf.   4. Pt to demonstrate improved functional ability with FOTO score >=65% .  5. Pt independent with HEP and demonstrates good return technique.  Plan     Cont with POC strength, stability.    Maria Licona, PT

## 2025-03-27 ENCOUNTER — CLINICAL SUPPORT (OUTPATIENT)
Dept: REHABILITATION | Facility: OTHER | Age: 50
End: 2025-03-27
Attending: PHYSICAL THERAPIST
Payer: COMMERCIAL

## 2025-03-27 DIAGNOSIS — M25.511 CHRONIC RIGHT SHOULDER PAIN: Primary | ICD-10-CM

## 2025-03-27 DIAGNOSIS — R29.3 POSTURE IMBALANCE: ICD-10-CM

## 2025-03-27 DIAGNOSIS — G89.29 CHRONIC RIGHT SHOULDER PAIN: Primary | ICD-10-CM

## 2025-03-27 PROCEDURE — 97112 NEUROMUSCULAR REEDUCATION: CPT | Mod: PN | Performed by: PHYSICAL THERAPIST

## 2025-03-27 PROCEDURE — 97140 MANUAL THERAPY 1/> REGIONS: CPT | Mod: PN | Performed by: PHYSICAL THERAPIST

## 2025-03-27 NOTE — PROGRESS NOTES
"[facility]   Physical Therapy Treatment Note      Name: Ryder Mendoza  Clinic Number: [id number]    Therapy Diagnosis:   Encounter Diagnoses   Name Primary?    Chronic right shoulder pain Yes    Posture imbalance      Physician: Zoie Corey MD    Visit Date: 3/27/2025    Physician Orders: PT Eval and Treat   Medical Diagnosis from Referral: M25.511,G89.29 (ICD-10-CM) - Chronic right shoulder pain  Evaluation Date: 3/18/2025  Authorization Period Expiration: 12/31/2025  Plan of Care Expiration: 5/13/2025  Progress Note Due: 4/13/2025  Visit # / Visits authorized: 3/ 21   FOTO: 1/ 3     Precautions: Standard      Time In: 0815  Time Out: 0915  Total Billable Time: 60 minutes    PTA Visit #: 0/5       Subjective     Patient reports: doing ok today. He had a little soreness yesterday, but it passed. He says soreness doesn't feel muscular, feels more like a tendon.  He was compliant with home exercise program.  Response to previous treatment: mild soreness the day after   Functional change: ongoing    Pain: 0/10, 3-4/10 with some motions   Location: R lateral shoulder and upper arm to elbow    Objective      Objective Measures updated at progress report unless specified.     Treatment     Ryder received the treatments listed below:      therapeutic exercises to develop strength, endurance, ROM, flexibility, posture, and core stabilization for 5 minutes including:    +R SL sleeper stretch x 5x10" holds  +self inf/lat GHJ glide x 5x10"    Door frame lat stretch 3 x 30" - reviewed for HEP  Uni pec stretch 3 x 30" - reviewed for HEP    manual therapy techniques: Joint mobilizations, Manual traction, Myofacial release, and Soft tissue Mobilization were applied to the: R shoulder for 10 minutes, including:  GHJ inf/ AP glides, C/R stretching into 90-90 IR with manual OP    neuromuscular re-education activities to improve: Balance, Coordination, Kinesthetic, Sense, Proprioception, and " "Posture for 40 minutes. The following activities were included:  Self Inf glide x10x10"  Prone EOM    I x3x10 x 5" holds   T x3x10 x 5" holds   Y x3x10 x 5" holds    Sidelying   Abduction 20x   Flexion 20x   HA 20x   ER with towel roll 20x    +low row press 5" x 15  +Inferior glide press 5" x 15    therapeutic activities to improve functional performance for 5  minutes, including:  Wall slide reach/roll/lift 2 x 10         Patient Education and Home Exercises       Education provided:   - none today    Written Home Exercises Provided: Yes. Exercises were reviewed and Ryder was able to demonstrate them prior to the end of the session.  Ryder demonstrated good  understanding of the education provided. See Electronic Medical Record under Patient Instructions for exercises provided during therapy sessions    Assessment     Good tolerance to treatment today. Heavy cuing for scapular retraction and depression, good imrpovement noted with prone isotonics. Pt reported mild pinching at end range initially with reach/roll/lift, improved with instruction to "over reach."    Ryder Is progressing well towards his goals.   Patient prognosis is Good.     Patient will continue to benefit from skilled outpatient physical therapy to address the deficits listed in the problem list box on initial evaluation, provide pt/family education and to maximize pt's level of independence in the home and community environment.     Patient's spiritual, cultural and educational needs considered and pt agreeable to plan of care and goals.     Anticipated barriers to physical therapy: standard    Goals: updated 03/27/2025   Short Term Goals (4 Weeks):   1. Pt to demonstrate improved PROM by 10% to allow pt to perform self care activities with increased ease. (Progressing, not met)  2. Pt to demonstrate improved flexibility by a half grade to allow improved ADLs with increased ease. (Progressing, not met)  3. Pt will report <3/10 pain at worst " within the R shoulder for ease with computer use. (Progressing, not met)  4. Pt will report being independent with his/her HEP for maintenance of improvements gained during therapy sessions. (Progressing, not met)     Long Term Goals (8 Weeks):   1. Pt to demonstrate improved ROM to WNL, R=L, to allow pt to perform self care with increased ease. (Progressing, not met)  2. Pt to demonstrate improved flexibility by a full grade to allow improved postural alignment with increased ease. (Progressing, not met)  3. Pt will demonstrate >=4+/5 strength within the R shoulder for ease with return to tennis and golf. (Progressing, not met)  4. Pt to demonstrate improved functional ability with FOTO score >=65% . (Progressing, not met)  5. Pt independent with HEP and demonstrates good return technique. (Progressing, not met)     Plan     Continue per POC with focus on scapular stability and mobility.     Radha Camara, PT, DPT

## 2025-04-01 ENCOUNTER — CLINICAL SUPPORT (OUTPATIENT)
Dept: REHABILITATION | Facility: OTHER | Age: 50
End: 2025-04-01
Payer: COMMERCIAL

## 2025-04-01 DIAGNOSIS — G89.29 CHRONIC RIGHT SHOULDER PAIN: Primary | ICD-10-CM

## 2025-04-01 DIAGNOSIS — M25.511 CHRONIC RIGHT SHOULDER PAIN: Primary | ICD-10-CM

## 2025-04-01 DIAGNOSIS — R29.3 POSTURE IMBALANCE: ICD-10-CM

## 2025-04-01 PROCEDURE — 97140 MANUAL THERAPY 1/> REGIONS: CPT | Mod: PN | Performed by: PHYSICAL THERAPIST

## 2025-04-01 PROCEDURE — 97530 THERAPEUTIC ACTIVITIES: CPT | Mod: PN | Performed by: PHYSICAL THERAPIST

## 2025-04-01 PROCEDURE — 97112 NEUROMUSCULAR REEDUCATION: CPT | Mod: PN | Performed by: PHYSICAL THERAPIST

## 2025-04-01 NOTE — PROGRESS NOTES
"[facility]   Physical Therapy Treatment Note      Name: Ryder Mendoza  Clinic Number: [id number]    Therapy Diagnosis:   Encounter Diagnoses   Name Primary?    Chronic right shoulder pain Yes    Posture imbalance      Physician: Zoie Corey MD    Visit Date: 4/1/2025    Physician Orders: PT Eval and Treat   Medical Diagnosis from Referral: M25.511,G89.29 (ICD-10-CM) - Chronic right shoulder pain  Evaluation Date: 3/18/2025  Authorization Period Expiration: 12/31/2025  Plan of Care Expiration: 5/13/2025  Progress Note Due: 4/13/2025  Visit # / Visits authorized: 5/ 21   FOTO: 1/ 3     Precautions: Standard      Time In: 0815  Time Out: 0915  Total Billable Time: 60 minutes    PTA Visit #: 0/5       Subjective     Patient reports: he's noticing improvement in his range of motion, and pain  He was compliant with home exercise program.  Response to previous treatment: mild soreness the day after   Functional change: ongoing    Pain: 0/10, 3/10 with some motions   Location: R lateral shoulder and upper arm to elbow    Objective      Objective Measures updated at progress report unless specified.     Treatment     Ryder received the treatments listed below:      therapeutic exercises to develop strength, endurance, ROM, flexibility, posture, and core stabilization for 3 minutes including:    R SL sleeper stretch x 5x10" holds  +R IR stretch with strap  5 x 10" hold  self inf/lat GHJ glide x 5x10"    Door frame lat stretch 3 x 30" - reviewed for HEP  Uni pec stretch 3 x 30" - reviewed for HEP    manual therapy techniques: Joint mobilizations, Manual traction, Myofacial release, and Soft tissue Mobilization were applied to the: R shoulder for 10 minutes, including:  GHJ inf/ AP glides, C/R stretching into 90-90 IR with manual OP    neuromuscular re-education activities to improve: Balance, Coordination, Kinesthetic, Sense, Proprioception, and Posture for 39 minutes. The following " "activities were included:  Self Inf glide x10x10"  Prone EOM    I x3x10 x 5" holds   T x3x10 x 5" holds   Y x3x10 x 5" holds    Sidelying   Abduction 20x with 2#   Flexion 20x BW   HA 20x with 1#   ER with towel roll 20x with 2#    low row press 5" x 15  Inferior glide press 5" x 15    therapeutic activities to improve functional performance for 8  minutes, including:  Wall slide reach/roll/lift 2 x 10   +IR walkout x RTB x 10  +ER walkout x YTB x 10    Added to HEP: low row and inferior glide      Patient Education and Home Exercises       Education provided:   - none today    Written Home Exercises Provided: Yes. Exercises were reviewed and Ryder was able to demonstrate them prior to the end of the session.  Ryder demonstrated good  understanding of the education provided. See Electronic Medical Record under Patient Instructions for exercises provided during therapy sessions    Assessment     Good tolerance to treatment today. Weight added to sidelying exercises as noted with good training effect noted. Unable to tolerate weight with sidelying flexion, but no pain bodyweight. Good improvement noted in scapular recruitment with prone series. Added IR/ER walkouts with no c/o pain or difficulty     Ryder Is progressing well towards his goals.   Patient prognosis is Good.     Patient will continue to benefit from skilled outpatient physical therapy to address the deficits listed in the problem list box on initial evaluation, provide pt/family education and to maximize pt's level of independence in the home and community environment.     Patient's spiritual, cultural and educational needs considered and pt agreeable to plan of care and goals.     Anticipated barriers to physical therapy: standard    Goals: updated 04/01/2025   Short Term Goals (4 Weeks):   1. Pt to demonstrate improved PROM by 10% to allow pt to perform self care activities with increased ease. (Progressing, not met)  2. Pt to demonstrate improved " flexibility by a half grade to allow improved ADLs with increased ease. (Progressing, not met)  3. Pt will report <3/10 pain at worst within the R shoulder for ease with computer use. (Progressing, not met)  4. Pt will report being independent with his/her HEP for maintenance of improvements gained during therapy sessions. (Progressing, not met)     Long Term Goals (8 Weeks):   1. Pt to demonstrate improved ROM to WNL, R=L, to allow pt to perform self care with increased ease. (Progressing, not met)  2. Pt to demonstrate improved flexibility by a full grade to allow improved postural alignment with increased ease. (Progressing, not met)  3. Pt will demonstrate >=4+/5 strength within the R shoulder for ease with return to tennis and golf. (Progressing, not met)  4. Pt to demonstrate improved functional ability with FOTO score >=65% . (Progressing, not met)  5. Pt independent with HEP and demonstrates good return technique. (Progressing, not met)     Plan     Continue per POC with focus on scapular stability and mobility.     Radha Camara, PT, DPT

## 2025-04-03 ENCOUNTER — CLINICAL SUPPORT (OUTPATIENT)
Dept: REHABILITATION | Facility: OTHER | Age: 50
End: 2025-04-03
Payer: COMMERCIAL

## 2025-04-03 DIAGNOSIS — G89.29 CHRONIC RIGHT SHOULDER PAIN: Primary | ICD-10-CM

## 2025-04-03 DIAGNOSIS — R29.3 POSTURE IMBALANCE: ICD-10-CM

## 2025-04-03 DIAGNOSIS — M25.511 CHRONIC RIGHT SHOULDER PAIN: Primary | ICD-10-CM

## 2025-04-03 PROCEDURE — 97530 THERAPEUTIC ACTIVITIES: CPT | Mod: PN | Performed by: PHYSICAL THERAPIST

## 2025-04-03 PROCEDURE — 97112 NEUROMUSCULAR REEDUCATION: CPT | Mod: PN | Performed by: PHYSICAL THERAPIST

## 2025-04-03 PROCEDURE — 97140 MANUAL THERAPY 1/> REGIONS: CPT | Mod: PN | Performed by: PHYSICAL THERAPIST

## 2025-04-03 NOTE — PROGRESS NOTES
"[facility]   Physical Therapy Treatment Note      Name: Ryder Mendoza  Clinic Number: [id number]    Therapy Diagnosis:   Encounter Diagnoses   Name Primary?    Chronic right shoulder pain Yes    Posture imbalance      Physician: Zoie Corey MD    Visit Date: 4/3/2025    Physician Orders: PT Eval and Treat   Medical Diagnosis from Referral: M25.511,G89.29 (ICD-10-CM) - Chronic right shoulder pain  Evaluation Date: 3/18/2025  Authorization Period Expiration: 12/31/2025  Plan of Care Expiration: 5/13/2025  Progress Note Due: 4/13/2025  Visit # / Visits authorized: 6/ 21   FOTO: 1/ 3     Precautions: Standard      Time In: 1615  Time Out: 1715  Total Billable Time: 60 minutes    PTA Visit #: 0/5       Subjective     Patient reports: doing ok, no significant changes today. He's had some pain at rest, but says it resolves much more quickly than it used to.   He was compliant with home exercise program.  Response to previous treatment: mild soreness the day after   Functional change: ongoing    Pain: 0/10, 3/10 with some motions   Location: R lateral shoulder and upper arm to elbow    Objective      Objective Measures updated at progress report unless specified.     Treatment     Ryder received the treatments listed below:      therapeutic exercises to develop strength, endurance, ROM, flexibility, posture, and core stabilization for 3 minutes including:    R SL sleeper stretch x 5x10" holds  R IR stretch with strap  5 x 10" hold  self inf/lat GHJ glide x 5x10"    Door frame lat stretch 3 x 30" - reviewed for HEP  Uni pec stretch 3 x 30" - reviewed for HEP    manual therapy techniques: Joint mobilizations, Manual traction, Myofacial release, and Soft tissue Mobilization were applied to the: R shoulder for 10 minutes, including:  GHJ inf/ AP glides, C/R stretching into 90-90 IR with manual OP    neuromuscular re-education activities to improve: Balance, Coordination, Kinesthetic, " "Sense, Proprioception, and Posture for 32 minutes. The following activities were included:  Self Inf glide x10x10"  Prone EOM    I x3x10 x 5" holds with 1#   T x3x10 x 5" holds with 1#   Y x3x10 x 5" holds with 1#    Sidelying   Abduction 20x with 2#   Flexion 20x BW   HA 20x with 1#   ER with towel roll 20x with 2#    low row press 5" x 15  Inferior glide press 5" x 15    therapeutic activities to improve functional performance for 15 minutes, including:  Wall slide reach/roll/lift 2 x 10   IR walkout x RTB x 10  ER walkout x YTB x 10  +IR x RTB x 2 x 10  +ER x YTB x 2 x 10  +Rows x GTB x 20  +SALPD x GTB x 20          Patient Education and Home Exercises       Education provided:   - none today    Written Home Exercises Provided: Yes. Exercises were reviewed and Ryder was able to demonstrate them prior to the end of the session.  Ryder demonstrated good  understanding of the education provided. See Electronic Medical Record under Patient Instructions for exercises provided during therapy sessions    Assessment     Good tolerance to treatment today. Added 1# weight to prone isotonics with good training effect. Progression of RTC and scapular band exercises today, verbal and tactile cuing for technique with good return demonstration. Pt advised to trial use of timer to check posture periodically to avoid onset of pain from prolonged forward positioning.     Ryder Is progressing well towards his goals.   Patient prognosis is Good.     Patient will continue to benefit from skilled outpatient physical therapy to address the deficits listed in the problem list box on initial evaluation, provide pt/family education and to maximize pt's level of independence in the home and community environment.     Patient's spiritual, cultural and educational needs considered and pt agreeable to plan of care and goals.     Anticipated barriers to physical therapy: standard    Goals: updated 04/03/2025   Short Term Goals (4 Weeks): "   1. Pt to demonstrate improved PROM by 10% to allow pt to perform self care activities with increased ease. (Progressing, not met)  2. Pt to demonstrate improved flexibility by a half grade to allow improved ADLs with increased ease. (Progressing, not met)  3. Pt will report <3/10 pain at worst within the R shoulder for ease with computer use. (Progressing, not met)  4. Pt will report being independent with his/her HEP for maintenance of improvements gained during therapy sessions. (Progressing, not met)     Long Term Goals (8 Weeks):   1. Pt to demonstrate improved ROM to WNL, R=L, to allow pt to perform self care with increased ease. (Progressing, not met)  2. Pt to demonstrate improved flexibility by a full grade to allow improved postural alignment with increased ease. (Progressing, not met)  3. Pt will demonstrate >=4+/5 strength within the R shoulder for ease with return to tennis and golf. (Progressing, not met)  4. Pt to demonstrate improved functional ability with FOTO score >=65% . (Progressing, not met)  5. Pt independent with HEP and demonstrates good return technique. (Progressing, not met)     Plan     Continue per POC with focus on scapular stability and mobility.     Radha Camara, PT, DPT

## 2025-04-08 ENCOUNTER — CLINICAL SUPPORT (OUTPATIENT)
Dept: REHABILITATION | Facility: OTHER | Age: 50
End: 2025-04-08
Payer: COMMERCIAL

## 2025-04-08 ENCOUNTER — DOCUMENTATION ONLY (OUTPATIENT)
Dept: REHABILITATION | Facility: OTHER | Age: 50
End: 2025-04-08
Payer: COMMERCIAL

## 2025-04-08 DIAGNOSIS — R29.3 POSTURE IMBALANCE: ICD-10-CM

## 2025-04-08 DIAGNOSIS — M25.511 CHRONIC RIGHT SHOULDER PAIN: Primary | ICD-10-CM

## 2025-04-08 DIAGNOSIS — G89.29 CHRONIC RIGHT SHOULDER PAIN: Primary | ICD-10-CM

## 2025-04-08 PROCEDURE — 97112 NEUROMUSCULAR REEDUCATION: CPT | Mod: PN,CQ

## 2025-04-08 PROCEDURE — 97530 THERAPEUTIC ACTIVITIES: CPT | Mod: PN,CQ

## 2025-04-08 NOTE — PROGRESS NOTES
Physical Therapist and Physical Therapist Assistant met face to face to discuss patient's treatment plan and progress towards established goals. Pt will be seen by a physical therapist minimally every 6th visit or every 30 days.    Torri Watt PTA  04/08/2025

## 2025-04-08 NOTE — PROGRESS NOTES
"[facility]   Physical Therapy Treatment Note      Name: Ryder Mendoza  Clinic Number: [id number]    Therapy Diagnosis:   Encounter Diagnoses   Name Primary?    Chronic right shoulder pain Yes    Posture imbalance        Physician: Zoie Corey MD    Visit Date: 4/8/2025    Physician Orders: PT Eval and Treat   Medical Diagnosis from Referral: M25.511,G89.29 (ICD-10-CM) - Chronic right shoulder pain  Evaluation Date: 3/18/2025  Authorization Period Expiration: 12/31/2025  Plan of Care Expiration: 5/13/2025  Progress Note Due: 4/13/2025  Visit # / Visits authorized: 7/ 21   FOTO: 1/ 3     Precautions: Standard      Time In: 4:18 pm   Time Out: 5:19 pm   Total Billable Time: 61 minutes    PTA Visit #: 1/5       Subjective     Patient reports: still bothersome with reaching into horizontal abduction. States flexion and IR behind the back is better today.     He was compliant with home exercise program.  Response to previous treatment: no soreness, felt good   Functional change: ongoing    Pain: 0/10, 3-4/10 with some motions   Location: R lateral shoulder and upper arm to elbow    Objective      Objective Measures updated at progress report unless specified.     Treatment     Ryder received the treatments listed below:      therapeutic exercises to develop strength, endurance, ROM, flexibility, posture, and core stabilization for 00 minutes including:    R SL sleeper stretch x 5x10" holds   R IR stretch with strap  5 x 10" hold   self inf/lat GHJ glide x 5x10"    Door frame lat stretch 3 x 30" - reviewed for HEP  Uni pec stretch 3 x 30" - reviewed for HEP    manual therapy techniques: Joint mobilizations, Manual traction, Myofacial release, and Soft tissue Mobilization were applied to the: R shoulder for 00 minutes, including:  GHJ inf/ AP glides, C/R stretching into 90-90 IR with manual OP    neuromuscular re-education activities to improve: Balance, Coordination, Kinesthetic, " "Sense, Proprioception, and Posture for 38 minutes. The following activities were included:  Self Inf glide x10x10"   Prone EOM    I x3x10 x 5" holds with 1#   T x3x10 x 5" holds with 1#   Y x3x10 x 5" holds with 1#    Sidelying   Abduction 2x8 with 2#    Flexion 20x BW (PTA assist with upward rotation with first 3 reps)    HA 30x with 1#    ER with towel roll 2x8 with 2#     low row press 5" x 15   Inferior glide press 5" x 15     therapeutic activities to improve functional performance for 23 minutes, including:  Wall slide reach/roll/lift 2 x 10   IR walkout x RTB x 10   ER walkout x YTB x 10   IR x RTB x 2 x 10  ER x YTB x 2 x 10  Rows x GTB x 20  SALPD x GTB x 20          Patient Education and Home Exercises       Education provided:   - none today    Written Home Exercises Provided: Yes. Exercises were reviewed and Ryder was able to demonstrate them prior to the end of the session.  Ryder demonstrated good  understanding of the education provided. See Electronic Medical Record under Patient Instructions for exercises provided during therapy sessions    Assessment     Overall good tolerance to treatment with no adverse effects. Required dverbal and tactile cues for proper scapular placement. PTA assist with first 3 reps with SL flex and abd, which pt had good improvement in range and coordination afterwards. Good periscapular juddering exhibited at the end of all the sidelying exercises. Continue to monitor and progress pt as tolerated.     Ryder Is progressing well towards his goals.   Patient prognosis is Good.     Patient will continue to benefit from skilled outpatient physical therapy to address the deficits listed in the problem list box on initial evaluation, provide pt/family education and to maximize pt's level of independence in the home and community environment.     Patient's spiritual, cultural and educational needs considered and pt agreeable to plan of care and goals.     Anticipated barriers " to physical therapy: standard    Goals: updated 04/08/2025   Short Term Goals (4 Weeks):   1. Pt to demonstrate improved PROM by 10% to allow pt to perform self care activities with increased ease. (Progressing, not met)  2. Pt to demonstrate improved flexibility by a half grade to allow improved ADLs with increased ease. (Progressing, not met)  3. Pt will report <3/10 pain at worst within the R shoulder for ease with computer use. (Progressing, not met)  4. Pt will report being independent with his/her HEP for maintenance of improvements gained during therapy sessions. (Progressing, not met)     Long Term Goals (8 Weeks):   1. Pt to demonstrate improved ROM to WNL, R=L, to allow pt to perform self care with increased ease. (Progressing, not met)  2. Pt to demonstrate improved flexibility by a full grade to allow improved postural alignment with increased ease. (Progressing, not met)  3. Pt will demonstrate >=4+/5 strength within the R shoulder for ease with return to tennis and golf. (Progressing, not met)  4. Pt to demonstrate improved functional ability with FOTO score >=65% . (Progressing, not met)  5. Pt independent with HEP and demonstrates good return technique. (Progressing, not met)     Plan     Continue per POC with focus on scapular stability and mobility.     Torri Watt, PTA

## 2025-04-10 ENCOUNTER — PATIENT MESSAGE (OUTPATIENT)
Dept: REHABILITATION | Facility: OTHER | Age: 50
End: 2025-04-10
Payer: COMMERCIAL

## 2025-04-17 ENCOUNTER — CLINICAL SUPPORT (OUTPATIENT)
Dept: REHABILITATION | Facility: OTHER | Age: 50
End: 2025-04-17
Payer: COMMERCIAL

## 2025-04-17 DIAGNOSIS — R29.3 POSTURE IMBALANCE: ICD-10-CM

## 2025-04-17 DIAGNOSIS — G89.29 CHRONIC RIGHT SHOULDER PAIN: Primary | ICD-10-CM

## 2025-04-17 DIAGNOSIS — M25.511 CHRONIC RIGHT SHOULDER PAIN: Primary | ICD-10-CM

## 2025-04-17 PROCEDURE — 97530 THERAPEUTIC ACTIVITIES: CPT | Mod: PN

## 2025-04-17 PROCEDURE — 97112 NEUROMUSCULAR REEDUCATION: CPT | Mod: PN

## 2025-04-17 NOTE — PROGRESS NOTES
"[facility]   Physical Therapy Treatment Note      Name: Ryder Mendoza  Clinic Number: [id number]    Therapy Diagnosis:   No diagnosis found.      Physician: Zoie Corey MD    Visit Date: 4/17/2025    Physician Orders: PT Eval and Treat   Medical Diagnosis from Referral: M25.511,G89.29 (ICD-10-CM) - Chronic right shoulder pain  Evaluation Date: 3/18/2025  Authorization Period Expiration: 12/31/2025  Plan of Care Expiration: 5/13/2025  Progress Note Due: 4/13/2025  Visit # / Visits authorized: 7/ 21   FOTO: 1/ 3     Precautions: Standard      Time In: 4:18 pm   Time Out: 5:19 pm   Total Billable Time: 61 minutes    PTA Visit #: 1/5       Subjective     Patient reports: Has been away from PT due to being OOT and scheduling difficulties. Reports daily pain is much improved, with ability to move OH and reaching behind without pain. Has not returned to tennis or golf yet. UA to ID if sitting at his desk is symptomatic as he has not been in the office recently. Denies radiating pain down the arm recently.    He was compliant with home exercise program.  Response to previous treatment: no soreness, felt good   Functional change: ongoing    Pain: 0/10, <2/10 with some motions   Location: R lateral shoulder and upper arm to elbow    Objective      Objective Measures updated at progress report unless specified.     Treatment     Ryder received the treatments listed below:      therapeutic exercises to develop strength, endurance, ROM, flexibility, posture, and core stabilization for 00 minutes including:    R SL sleeper stretch x 5x10" holds   R IR stretch with strap  5 x 10" hold   self inf/lat GHJ glide x 5x10"    Door frame lat stretch 3 x 30" - reviewed for HEP  Uni pec stretch 3 x 30" - reviewed for HEP    manual therapy techniques: Joint mobilizations, Manual traction, Myofacial release, and Soft tissue Mobilization were applied to the: R shoulder for 00 minutes, including:  GHJ " "inf/ AP glides, C/R stretching into 90-90 IR with manual OP    neuromuscular re-education activities to improve: Balance, Coordination, Kinesthetic, Sense, Proprioception, and Posture for 25 minutes. The following activities were included:  Self Inf glide x10x10"   Prone EOM    I x2x10 x 5" holds with 2#   T x2x10 x 5" holds with 2#   Y x2x10 x 5" holds with 2#    Sidelying   Abduction (gator chomp) 1x10x1#, 2x10x2#   Flexion to 90 1x10x1#, 2x10x2#   HA 30x with 1# - d/c   ER with towel roll 2x8 with 2# - d/c    +ER ball drop x3x30" w/ green wt ball    low row press 5" x 15   Inferior glide press 5" x 15     therapeutic activities to improve functional performance for 35 minutes, including:  +robot arm IR (90-90 flex lifts) x1x10 x YTB  +robot arm ER (90-90 flex lifts) x1x10 x YTB  +ER iso w/ flex to 90 x2x7 x orange loop  +wall clocks x1x10 x orange loop     Wall slide reach/roll/lift 2 x 10   IR walkout x RTB x 10   ER walkout x YTB x 10   IR x RTB x 2 x 10 -- consider changing to 90-90 abd nv  ER x YTB x 2 x 10 -- consider changing to 90-90 abd nv  Rows x GTB x 3x6 -- changed to [employer] today  SALPD x GTB x 4x20" holds -- heavy emphasis on scap positioning and limiting GHJ ext  +EOM plank with SA punch (iso hold) x3x30"        Patient Education and Home Exercises       Education provided:   - none today    Written Home Exercises Provided: Yes. Exercises were reviewed and Ryder was able to demonstrate them prior to the end of the session.  Ryder demonstrated good  understanding of the education provided. See Electronic Medical Record under Patient Instructions for exercises provided during therapy sessions    Assessment     Attempted to progress scap strength and stabilization program today. Good tolerance with mod fatigue. Moderate winging of medial scap border R>L with attempted 90-90 ER/IR activities and EOM plank with serratus push up. Intermittent VC/TC for scapular placement during standing activities, " particularly with robot arm and ER with flexion lifts bilaterally. Continue scap stabilization program ruslan Soler Is progressing well towards his goals.   Patient prognosis is Good.     Patient will continue to benefit from skilled outpatient physical therapy to address the deficits listed in the problem list box on initial evaluation, provide pt/family education and to maximize pt's level of independence in the home and community environment.     Patient's spiritual, cultural and educational needs considered and pt agreeable to plan of care and goals.     Anticipated barriers to physical therapy: standard    Goals: updated 04/17/2025   Short Term Goals (4 Weeks):   1. Pt to demonstrate improved PROM by 10% to allow pt to perform self care activities with increased ease. (Progressing, not met)  2. Pt to demonstrate improved flexibility by a half grade to allow improved ADLs with increased ease. (Progressing, not met)  3. Pt will report <3/10 pain at worst within the R shoulder for ease with computer use. (Progressing, not met)  4. Pt will report being independent with his/her HEP for maintenance of improvements gained during therapy sessions. (Progressing, not met)     Long Term Goals (8 Weeks):   1. Pt to demonstrate improved ROM to WNL, R=L, to allow pt to perform self care with increased ease. (Progressing, not met)  2. Pt to demonstrate improved flexibility by a full grade to allow improved postural alignment with increased ease. (Progressing, not met)  3. Pt will demonstrate >=4+/5 strength within the R shoulder for ease with return to tennis and golf. (Progressing, not met)  4. Pt to demonstrate improved functional ability with FOTO score >=65% . (Progressing, not met)  5. Pt independent with HEP and demonstrates good return technique. (Progressing, not met)     Plan     Continue per POC with focus on scapular stability and mobility.     Maria Licona, PT

## 2025-04-24 ENCOUNTER — CLINICAL SUPPORT (OUTPATIENT)
Dept: REHABILITATION | Facility: OTHER | Age: 50
End: 2025-04-24
Payer: COMMERCIAL

## 2025-04-24 DIAGNOSIS — G89.29 CHRONIC RIGHT SHOULDER PAIN: Primary | ICD-10-CM

## 2025-04-24 DIAGNOSIS — R29.3 POSTURE IMBALANCE: ICD-10-CM

## 2025-04-24 DIAGNOSIS — M25.511 CHRONIC RIGHT SHOULDER PAIN: Primary | ICD-10-CM

## 2025-04-24 PROCEDURE — 97530 THERAPEUTIC ACTIVITIES: CPT | Mod: PN | Performed by: PHYSICAL THERAPIST

## 2025-04-24 NOTE — PROGRESS NOTES
[facility]   Physical Therapy Treatment Note      Name: Ryder Mendoza  Clinic Number: [id number]    Therapy Diagnosis:   Encounter Diagnoses   Name Primary?    Chronic right shoulder pain Yes    Posture imbalance          Physician: Zoie Corey MD    Visit Date: 4/24/2025    Physician Orders: PT Eval and Treat   Medical Diagnosis from Referral: M25.511,G89.29 (ICD-10-CM) - Chronic right shoulder pain  Evaluation Date: 3/18/2025  Authorization Period Expiration: 12/31/2025  Plan of Care Expiration: 5/13/2025  Progress Note Due: 5/13/2025  Visit # / Visits authorized: 9/ 21   FOTO: 2/ 3 last issued 4/24/2025     Precautions: Standard      Time In: 1615  Time Out: 1715  Total Billable Time: 60 minutes    PTA Visit #: 0/5       Subjective     Patient reports: overall he's been feeling a lot better. He still has a little tightness with reaching back, but that's a lot better than it used to be. His main complaint is shoulder pain while he's sleeping. He says it doesn't wake him from sleep, but when he wakes up he's aware that it hurts. He tends to be a side sleeper, usually has arm bent up under his pillow. Doesn't seem to matter which side he's sleeping on for shoulder to be aggravated.     He was compliant with home exercise program.  Response to previous treatment: no soreness, felt good   Functional change: ongoing    Pain: 0/10, <2/10 with some motions   Location: R lateral shoulder and upper arm to elbow    Objective      Objective Measures updated at progress report unless specified.   4/24/2025    Passive Range of Motion:   Shoulder Left Right   Flexion 180 170 (limited by lat tightness)   Abduction 160 145 (ERP)   ER at 90 100 100   IR 70 60            Apley's Scratch Test    Left Right   Combined ER T2 T2   Combined IR T12 L1   Cross-body reach Post opp shoulder Post opp shoulder         Upper Extremity Strength    (L) UE (R) UE   Shoulder flexion: 5/5 4+/5   Shoulder  "Abduction: 5/5 4+/5   Shoulder ER 5/5 5/5   Shoulder IR 5/5 5/5   Lower Trap 4/5 4+/5   Upper Trap 4+/5 4+/5   Rhomboids 4+/5 4+/5   Elbow flexion: 5/5 5/5   Elbow extension: 5/5 5/5             CMS Impairment/Limitation/Restriction for FOTO Shoulder Survey    Therapist reviewed FOTO scores for Ryder Mendoza on 4/24/2025.   FOTO documents entered into [facility] - see Media section.    Evaluation: 68%    Current : 72%    Goal: 75%         Treatment     Ryder received the treatments listed below:      therapeutic exercises to develop strength, endurance, ROM, flexibility, posture, and core stabilization for 00 minutes including:    R SL sleeper stretch x 5x10" holds   R IR stretch with strap  5 x 10" hold   self inf/lat GHJ glide x 5x10"    Door frame lat stretch 3 x 30" - reviewed for HEP  Uni pec stretch 3 x 30" - reviewed for HEP    manual therapy techniques: Joint mobilizations, Manual traction, Myofacial release, and Soft tissue Mobilization were applied to the: R shoulder for 00 minutes, including:  GHJ inf/ AP glides, C/R stretching into 90-90 IR with manual OP    neuromuscular re-education activities to improve: Balance, Coordination, Kinesthetic, Sense, Proprioception, and Posture for 00 minutes. The following activities were included:  Self Inf glide x10x10"   Prone EOM    I x2x10 x 5" holds with 2#   T x2x10 x 5" holds with 2#   Y x2x10 x 5" holds with 2#    Sidelying   Abduction (gator chomp) 1x10x1#, 2x10x2#   Flexion to 90 1x10x1#, 2x10x2#   HA 30x with 1# - d/c   ER with towel roll 2x8 with 2# - d/c    +ER ball drop x3x30" w/ green wt ball    low row press 5" x 15   Inferior glide press 5" x 15     therapeutic activities to improve functional performance for 60 minutes, including:    Reassessment for month progress note     robot arm IR (90-90 flex lifts) x1x10 x YTB  robot arm ER (90-90 flex lifts) x1x10 x YTB  +Genie IR/ER x YTB x 2 x 10 ea  ER iso w/ flex to 90 x2x10 x orange loop - leaning against " "wall for cuing  +wall clocks x1x10 x orange loop     Wall slide reach/roll/lift 2 x 10   IR walkout x RTB x 10   ER walkout x YTB x 10   IR x RTB x 2 x 10 -- consider changing to 90-90 abd nv  ER x YTB x 2 x 10 -- consider changing to 90-90 abd nv  Rows x GTB x 3x6 -- changed to Robberies today  SALPD x GTB x 4x20" holds -- heavy emphasis on scap positioning and limiting GHJ ext  +EOM plank with SA punch (iso hold) x3x30"        Patient Education and Home Exercises       Education provided:   - none today    Written Home Exercises Provided: Yes. Exercises were reviewed and Ryder was able to demonstrate them prior to the end of the session.  Ryder demonstrated good  understanding of the education provided. See Electronic Medical Record under Patient Instructions for exercises provided during therapy sessions    Assessment     Ryder is making excellent progress with therapy. He reports improved functional reaching, but continues with pain with sleeping. Pt educated on modifications for sleeping position to trial (holding pillow, quarter turn position). He demonstrates good improvement in strength with MMT, but continues with marked limitations to scapular proprioception.     Ryder Is progressing well towards his goals.   Patient prognosis is Good.     Patient will continue to benefit from skilled outpatient physical therapy to address the deficits listed in the problem list box on initial evaluation, provide pt/family education and to maximize pt's level of independence in the home and community environment.     Patient's spiritual, cultural and educational needs considered and pt agreeable to plan of care and goals.     Anticipated barriers to physical therapy: standard    Goals: updated 04/24/2025   Short Term Goals (4 Weeks):   1. Pt to demonstrate improved PROM by 10% to allow pt to perform self care activities with increased ease. (met)  2. Pt to demonstrate improved flexibility by a half grade to allow " improved ADLs with increased ease. (met)  3. Pt will report <3/10 pain at worst within the R shoulder for ease with computer use. (met)  4. Pt will report being independent with his/her HEP for maintenance of improvements gained during therapy sessions. (met)     Long Term Goals (8 Weeks):   1. Pt to demonstrate improved ROM to WNL, R=L, to allow pt to perform self care with increased ease. (Progressing, not met)  2. Pt to demonstrate improved flexibility by a full grade to allow improved postural alignment with increased ease. (Progressing, not met)  3. Pt will demonstrate >=4+/5 strength within the R shoulder for ease with return to tennis and golf. (Progressing, not met)  4. Pt to demonstrate improved functional ability with FOTO score >=65% . (Progressing, not met)  5. Pt independent with HEP and demonstrates good return technique. (Progressing, not met)     Plan     Continue per POC with focus on scapular stability and mobility.     Radha Camara, PT, DPT

## 2025-04-29 ENCOUNTER — CLINICAL SUPPORT (OUTPATIENT)
Dept: REHABILITATION | Facility: OTHER | Age: 50
End: 2025-04-29
Attending: PHYSICAL THERAPIST
Payer: COMMERCIAL

## 2025-04-29 DIAGNOSIS — G89.29 CHRONIC RIGHT SHOULDER PAIN: Primary | ICD-10-CM

## 2025-04-29 DIAGNOSIS — M25.511 CHRONIC RIGHT SHOULDER PAIN: Primary | ICD-10-CM

## 2025-04-29 DIAGNOSIS — R29.3 POSTURE IMBALANCE: ICD-10-CM

## 2025-04-29 PROCEDURE — 97530 THERAPEUTIC ACTIVITIES: CPT | Mod: PN | Performed by: PHYSICAL THERAPIST

## 2025-04-29 NOTE — PROGRESS NOTES
[facility]   Physical Therapy Treatment and Discharge Note      Name: Ryder Mendoza  Clinic Number: [id number]    Therapy Diagnosis:   Encounter Diagnoses   Name Primary?    Chronic right shoulder pain Yes    Posture imbalance          Physician: Zoie Corey MD    Visit Date: 4/29/2025    Physician Orders: PT Eval and Treat   Medical Diagnosis from Referral: M25.511,G89.29 (ICD-10-CM) - Chronic right shoulder pain  Evaluation Date: 3/18/2025  Authorization Period Expiration: 12/31/2025  Plan of Care Expiration: 5/13/2025  Progress Note Due: 5/13/2025  Visit # / Visits authorized: 10/ 21   FOTO: 3/ 3 last issued 4/29/2025     Precautions: Standard      Time In: 1615  Time Out: 1700  Total Billable Time: 45 minutes    PTA Visit #: 0/5       Subjective     Patient reports: overall he's been feeling good. He still has a little discomfort sleeping, but he's working on trying different positions. He feels like he can finish with therapy today and continue on his own.     He was compliant with home exercise program.  Response to previous treatment: no soreness, felt good   Functional change: ongoing    Pain: 0/10, <2/10 with some motions   Location: R lateral shoulder and upper arm to elbow    Objective      Objective Measures updated at progress report unless specified.   4/24/2025    Passive Range of Motion:   Shoulder Left Right   Flexion 180 170 (limited by lat tightness)   Abduction 160 160   ER at 90 100 100   IR 70 60            Apley's Scratch Test    Left Right   Combined ER T2 T2   Combined IR T12 L1   Cross-body reach Post opp shoulder Post opp shoulder         Upper Extremity Strength    (L) UE (R) UE   Shoulder flexion: 5/5 4+/5   Shoulder Abduction: 5/5 4+/5   Shoulder ER 5/5 5/5   Shoulder IR 5/5 5/5   Lower Trap 4/5 4+/5   Upper Trap 4+/5 4+/5   Rhomboids 4+/5 4+/5   Elbow flexion: 5/5 5/5   Elbow extension: 5/5 5/5             CMS Impairment/Limitation/Restriction  "for FOTO Shoulder Survey    Therapist reviewed FOTO scores for Ryder Mendoza on 4/29/2025.   FOTO documents entered into [employer] - see Media section.    Evaluation: 68%    Current : 79%    Goal: 75%         Treatment     Ryder received the treatments listed below:      therapeutic exercises to develop strength, endurance, ROM, flexibility, posture, and core stabilization for 00 minutes including:    R SL sleeper stretch x 5x10" holds   R IR stretch with strap  5 x 10" hold   self inf/lat GHJ glide x 5x10"    Door frame lat stretch 3 x 30" - reviewed for HEP  Uni pec stretch 3 x 30" - reviewed for HEP    manual therapy techniques: Joint mobilizations, Manual traction, Myofacial release, and Soft tissue Mobilization were applied to the: R shoulder for 00 minutes, including:  GHJ inf/ AP glides, C/R stretching into 90-90 IR with manual OP    neuromuscular re-education activities to improve: Balance, Coordination, Kinesthetic, Sense, Proprioception, and Posture for 00 minutes. The following activities were included:  Self Inf glide x10x10"   Prone EOM    I x2x10 x 5" holds with 2#   T x2x10 x 5" holds with 2#   Y x2x10 x 5" holds with 2#    Sidelying   Abduction (gator chomp) 1x10x1#, 2x10x2#   Flexion to 90 1x10x1#, 2x10x2#   HA 30x with 1# - d/c   ER with towel roll 2x8 with 2# - d/c    +ER ball drop x3x30" w/ green wt ball    low row press 5" x 15   Inferior glide press 5" x 15     therapeutic activities to improve functional performance for 45 minutes, including:    Reassessment and discharge     robot arm IR (90-90 flex lifts) x1x10 x against wall  robot arm ER (90-90 flex lifts) x1x10 x against wall  +Genie IR/ER against wall x 20  ER iso w/ flex to 90 x2x10 x orange loop - leaning against wall for cuing  HA 3-way x RTB x 20 against wall   +wall clocks x1x10 x orange loop     Wall slide reach/roll/lift 2 x 10   IR walkout x RTB x 10   ER walkout x YTB x 10   IR x RTB x 2 x 10 -- consider changing to 90-90 abd " "nv  ER x YTB x 2 x 10 -- consider changing to 90-90 abd nv  Rows x GTB x 3x6 -- changed to Robberies today  SALPD x GTB x 4x20" holds -- heavy emphasis on scap positioning and limiting GHJ ext  +EOM plank with SA punch (iso hold) x3x30"        Patient Education and Home Exercises       Education provided:   - none today    Written Home Exercises Provided: Yes. Exercises were reviewed and Ryder was able to demonstrate them prior to the end of the session.  Ryder demonstrated good  understanding of the education provided. See Electronic Medical Record under Patient Instructions for exercises provided during therapy sessions    Assessment     Ryder has made excellent progress with therapy overall. Functionally he reports no further pain with reaching or lifting. He continues with mild discomfort sleeping and has been educated on different options to modify sleeping position. He continues with noted scapular dyskinesia, but without any pain associated. He has been educated on recommended HEP to continue with scapular strength and proprioception. Pt is discharged to independent HEP at this time.     Ryder Is progressing well towards his goals.   Patient prognosis is Good.     Patient will continue to benefit from skilled outpatient physical therapy to address the deficits listed in the problem list box on initial evaluation, provide pt/family education and to maximize pt's level of independence in the home and community environment.     Patient's spiritual, cultural and educational needs considered and pt agreeable to plan of care and goals.     Anticipated barriers to physical therapy: standard    Goals: updated 04/29/2025   Short Term Goals (4 Weeks):   1. Pt to demonstrate improved PROM by 10% to allow pt to perform self care activities with increased ease. (met)  2. Pt to demonstrate improved flexibility by a half grade to allow improved ADLs with increased ease. (met)  3. Pt will report <3/10 pain at worst " within the R shoulder for ease with computer use. (met)  4. Pt will report being independent with his/her HEP for maintenance of improvements gained during therapy sessions. (met)     Long Term Goals (8 Weeks):   1. Pt to demonstrate improved ROM to WNL, R=L, to allow pt to perform self care with increased ease. (met)  2. Pt to demonstrate improved flexibility by a full grade to allow improved postural alignment with increased ease. (met)  3. Pt will demonstrate >=4+/5 strength within the R shoulder for ease with return to tennis and golf. (met)  4. Pt to demonstrate improved functional ability with FOTO score >=65% . (met)  5. Pt independent with HEP and demonstrates good return technique. (met)     Plan     Discharge to independent HEP    Radha Camara, PT, DPT

## 2025-07-17 ENCOUNTER — OFFICE VISIT (OUTPATIENT)
Dept: SPORTS MEDICINE | Facility: CLINIC | Age: 50
End: 2025-07-17
Payer: COMMERCIAL

## 2025-07-17 VITALS
SYSTOLIC BLOOD PRESSURE: 128 MMHG | WEIGHT: 261.69 LBS | BODY MASS INDEX: 33.6 KG/M2 | DIASTOLIC BLOOD PRESSURE: 88 MMHG | HEART RATE: 76 BPM

## 2025-07-17 DIAGNOSIS — M25.511 RIGHT SHOULDER PAIN, UNSPECIFIED CHRONICITY: Primary | ICD-10-CM

## 2025-07-17 PROCEDURE — 99999 PR PBB SHADOW E&M-EST. PATIENT-LVL III: CPT | Mod: PBBFAC,,, | Performed by: ORTHOPAEDIC SURGERY

## 2025-07-17 RX ORDER — TRIAMCINOLONE ACETONIDE 40 MG/ML
40 INJECTION, SUSPENSION INTRA-ARTICULAR; INTRAMUSCULAR
Status: DISCONTINUED | OUTPATIENT
Start: 2025-07-17 | End: 2025-07-17 | Stop reason: HOSPADM

## 2025-07-17 RX ADMIN — TRIAMCINOLONE ACETONIDE 40 MG: 40 INJECTION, SUSPENSION INTRA-ARTICULAR; INTRAMUSCULAR at 09:07

## 2025-07-17 NOTE — PROCEDURES
Large Joint Aspiration/Injection: R subacromial bursa    Date/Time: 7/17/2025 9:00 AM    Performed by: Julio Drake MD  Authorized by: Julio Drake MD    Consent Done?:  Yes (Verbal)  Indications:  Pain  Site marked: the procedure site was marked    Timeout: prior to procedure the correct patient, procedure, and site was verified    Prep: patient was prepped and draped in usual sterile fashion    Local anesthesia used?: No      Details:  Needle Size:  22 G  Ultrasonic Guidance for needle placement?: No    Approach:  Posterior  Location:  Shoulder  Site:  R subacromial bursa  Medications:  40 mg triamcinolone acetonide 40 mg/mL  Patient tolerance:  Patient tolerated the procedure well with no immediate complications

## 2025-07-17 NOTE — PROGRESS NOTES
CC: RIGHT shoulder pain     50 y.o. Male with a 8 month history of right shoulder atraumatic pain. He has felt shoulder pain after tennis for years but got acutely worse last November and has stopped playing tennis and working out. Pain wakes him up at night.   He states that the pain is severe and not responding to any conservative care he has tried meloxicam, oral steroids and PT.         Is affecting ADLs.  Pain is 8/10 at it's worst.      Past Medical History:   Diagnosis Date    Cataract     Diarrhea 2015    c-diff    Lattice degeneration, both eyes     MRSA (methicillin resistant staph aureus) culture positive     Stickler's syndrome        Past Surgical History:   Procedure Laterality Date    ABDOMINAL SURGERY      APPENDECTOMY      CATARACT EXTRACTION W/  INTRAOCULAR LENS IMPLANT Left 06/27/2016    Dr Parker     CATARACT EXTRACTION W/  INTRAOCULAR LENS IMPLANT Right 07/18/2016    Dr Parker     COLONOSCOPY N/A 5/23/2023    Procedure: COLONOSCOPY;  Surgeon: Rashaad Robles MD;  Location: [facility];  Service: Endoscopy;  Laterality: N/A;  pt request time, PEG prep, instructions portal-  5/19 pre-call attempted; no answer left message;   5/22 Pre call attempted. No answer: SG    EYE SURGERY  June 2016    HERNIA REPAIR  January 2017    VASECTOMY  November 2019       Family History   Problem Relation Name Age of Onset    Cataracts Mother Mireya Mendoza     Crohn's disease Mother Mireya Mendoza     COPD Mother Mireya Mendoza     Retinal detachment Father      Cataracts Father      No Known Problems Sister      Retinal detachment Brother 1/2     No Known Problems Daughter      No Known Problems Sister      Retinal detachment Sister 1/2     Scoliosis Sister 1/2     Scoliosis Sister 1/2     No Known Problems Daughter         Current Medications[1]    Review of patient's allergies indicates:  No Known Allergies       REVIEW OF SYSTEMS:  Constitution: Negative. Negative for chills, fever and night sweats.   HENT: Negative  for congestion and headaches.    Eyes: Negative for blurred vision, left vision loss and right vision loss.   Cardiovascular: Negative for chest pain and syncope.   Respiratory: Negative for cough and shortness of breath.    Endocrine: Negative for polydipsia, polyphagia and polyuria.   Hematologic/Lymphatic: Negative for bleeding problem. Does not bruise/bleed easily.   Skin: Negative for dry skin, itching and rash.   Musculoskeletal: Negative for falls.  Positive for right shoulder pain and muscle weakness.   Gastrointestinal: Negative for abdominal pain and bowel incontinence.   Genitourinary: Negative for bladder incontinence and nocturia.   Neurological: Negative for disturbances in coordination, loss of balance and seizures.   Psychiatric/Behavioral: Negative for depression. The patient does not have insomnia.    Allergic/Immunologic: Negative for hives and persistent infections.      PHYSICAL EXAMINATION:  Vitals:  /88 (BP Location: Right arm)   Pulse 76   Wt 118.7 kg (261 lb 11 oz)   BMI 33.60 kg/m²    General: The patient is alert and oriented x 3.  Mood is pleasant.  Observation of ears, eyes and nose reveal no gross abnormalities.  No labored breathing observed.    Physical exam positive for limited shoulder abduction < 90deg, limited flexion <90deg, limited external rotation. ROM limited do to pain, no locking/catching. No crepitus on passive movement. Positive orourke anshu, negative empty can.       RIGHT SHOULDER / UPPER EXTREMITY EXAM    OBSERVATION:     Swelling  none  Deformity  none   Discoloration  none   Scapular winging none   Scars   none  Atrophy  none    TENDERNESS / CREPITUS (T/C):          T/C      T/C   Clavicle   -/-  SUPRAspinatus    -/-     AC Jt.    -/-  INFRAspinatus  -/-    SC Jt.    -/-  Deltoid    -/-      G. Tuberosity  -/-  LH BICEP groove  -/-   Acromion:  -/-  Midline Neck   -/-     Scapular Spine -/-  Trapezium   -/-   SMA Scapula  -/-  GH jt. line -  post  -/-     Scapulothoracic  -/-         ROM: (* = with pain)  Left shoulder   Right shoulder        AROM (PROM)   AROM (PROM)   FE    170° (175°)     170° (175°)     ER at 0°    60°  (65°)    60°  (65°)   ER at 90° ABD  90°  (90°)    90°  (90°)   IR at 90°  ABD   NA  (40°)     NA  (40°)      IR (spine level)   T10     T10    STRENGTH: (* = with pain) Left shoulder   Right shoulder   SCAPTION   5/5    5/5    IR    5/5    5/5   ER    5/5    5/5   BICEPS   5/5    5/5   Deltoid    5/5    5/5     SIGNS:  Painful side       NEER   -    OROZS  neg    RIBEIRO   +    SPEEDS  neg     DROP ARM   -   BELLY PRESS neg   Superior escape none    LIFT-OFF  pos   X-Body ADD    neg    MOVING VALGUS neg        STABILITY TESTING    Left shoulder   Right shoulder    Translation     Anterior  up face     up face    Posterior  up face    up face    Sulcus   < 10mm    < 10 mm     Signs   Apprehension   neg      neg       Relocation   no change     no change      Jerk test  neg     neg    EXTREMITY NEURO-VASCULAR EXAM:    Sensation grossly intact to light touch all dermatomal regions.    DTR 2+ Biceps, Triceps, BR and Negative Joses sign   Grossly intact motor function at Elbow, Wrist and Hand   Distal pulses radial and ulnar 2+, brisk cap refill, symmetric.      NECK:  Painless FROM and spinous processes non-tender. Negative Spurlings sign.      OTHER FINDINGS:  - scapular dyskinesia    XRAYS:  Xrays including AP, Outlet and Axillary Lateral of shoulder are ordered / images reviewed by me:   No fracture dislocation or other pathology   Acromion type 1   Proximal migration of humeral head: None   GH arthritis: None          ASSESSMENT:   Right shoulder pain:  1. Right shoulder pain, unspecified chronicity        PLAN:      1. Steroid injection  2. Follow if necessary, consider MRI if no relief with injection    All questions were answered, patient will contact us for questions or concerns in the interim.               [1]    Current Outpatient Medications:     buPROPion (WELLBUTRIN XL) 300 MG 24 hr tablet, Take 1 tablet (300 mg total) by mouth once daily., Disp: 90 tablet, Rfl: 3    meloxicam (MOBIC) 15 MG tablet, Take 1 tablet (15 mg total) by mouth once daily. Take with food or pepcid otc (Patient not taking: Reported on 7/17/2025), Disp: 30 tablet, Rfl: 2

## 2025-07-17 NOTE — PROGRESS NOTES
CC: RIGHT shoulder pain     50 y.o. Male with a *** history of right shoulder atraumatic pain. Patient works as ***.     He states that the pain is severe and not responding to any conservative care.      He reports that the pain and weakness is worse with overhead activity. It also bothers him at night.    Is affecting ADLs.  Pain is ***/10 at it's worst.      Past Medical History:   Diagnosis Date    Cataract     Diarrhea 2015    c-diff    Lattice degeneration, both eyes     MRSA (methicillin resistant staph aureus) culture positive     Stickler's syndrome        Past Surgical History:   Procedure Laterality Date    ABDOMINAL SURGERY      APPENDECTOMY      CATARACT EXTRACTION W/  INTRAOCULAR LENS IMPLANT Left 06/27/2016    Dr Parker     CATARACT EXTRACTION W/  INTRAOCULAR LENS IMPLANT Right 07/18/2016    Dr Parker     COLONOSCOPY N/A 5/23/2023    Procedure: COLONOSCOPY;  Surgeon: Rashaad Robles MD;  Location: [facility];  Service: Endoscopy;  Laterality: N/A;  pt request time, PEG prep, instructions portal-  5/19 pre-call attempted; no answer left message;   5/22 Pre call attempted. No answer: SG    EYE SURGERY  June 2016    HERNIA REPAIR  January 2017    VASECTOMY  November 2019       Family History   Problem Relation Name Age of Onset    Cataracts Mother Mireya Mendoza     Crohn's disease Mother Mireya Mendoza     COPD Mother Mireya Mendoza     Retinal detachment Father      Cataracts Father      No Known Problems Sister      Retinal detachment Brother 1/2     No Known Problems Daughter      No Known Problems Sister      Retinal detachment Sister 1/2     Scoliosis Sister 1/2     Scoliosis Sister 1/2     No Known Problems Daughter         Current Medications[1]    Review of patient's allergies indicates:  No Known Allergies       REVIEW OF SYSTEMS:  Constitution: Negative. Negative for chills, fever and night sweats.   HENT: Negative for congestion and headaches.    Eyes: Negative for blurred vision, left vision loss  and right vision loss.   Cardiovascular: Negative for chest pain and syncope.   Respiratory: Negative for cough and shortness of breath.    Endocrine: Negative for polydipsia, polyphagia and polyuria.   Hematologic/Lymphatic: Negative for bleeding problem. Does not bruise/bleed easily.   Skin: Negative for dry skin, itching and rash.   Musculoskeletal: Negative for falls.  Positive for right shoulder pain and muscle weakness.   Gastrointestinal: Negative for abdominal pain and bowel incontinence.   Genitourinary: Negative for bladder incontinence and nocturia.   Neurological: Negative for disturbances in coordination, loss of balance and seizures.   Psychiatric/Behavioral: Negative for depression. The patient does not have insomnia.    Allergic/Immunologic: Negative for hives and persistent infections.      PHYSICAL EXAMINATION:  Vitals:  There were no vitals taken for this visit.   General: The patient is alert and oriented x 3.  Mood is pleasant.  Observation of ears, eyes and nose reveal no gross abnormalities.  No labored breathing observed.  Gait is coordinated. Patient can toe walk and heel walk without difficulty.      RIGHT SHOULDER / UPPER EXTREMITY EXAM    OBSERVATION:     Swelling  none  Deformity  none   Discoloration  none   Scapular winging none   Scars   none  Atrophy  none    TENDERNESS / CREPITUS (T/C):          T/C      T/C   Clavicle   -/-  SUPRAspinatus    -/-     AC Jt.    -/-  INFRAspinatus  -/-    SC Jt.    -/-  Deltoid    -/-      G. Tuberosity  -/-  LH BICEP groove  -/-   Acromion:  -/-  Midline Neck   -/-     Scapular Spine -/-  Trapezium   -/-   SMA Scapula  -/-  GH jt. line - post  -/-     Scapulothoracic  -/-         ROM: (* = with pain)  Left shoulder   Right shoulder        AROM (PROM)   AROM (PROM)   FE    170° (175°)     170° (175°)     ER at 0°    60°  (65°)    60°  (65°)   ER at 90° ABD  90°  (90°)    90°  (90°)   IR at 90°  ABD   NA  (40°)     NA  (40°)      IR (spine level)    T10     T10    STRENGTH: (* = with pain) Left shoulder   Right shoulder   SCAPTION   5/5    5/5    IR    5/5    5/5   ER    5/5    5/5   BICEPS   5/5    5/5   Deltoid    5/5    5/5     SIGNS:  Painful side       NEER   -    OROZS  neg    RIBEIRO   -    SPEEDS  neg     DROP ARM   -   BELLY PRESS neg   Superior escape none    LIFT-OFF  neg   X-Body ADD    neg    MOVING VALGUS neg        STABILITY TESTING    Left shoulder   Right shoulder    Translation     Anterior  up face     up face    Posterior  up face    up face    Sulcus   < 10mm    < 10 mm     Signs   Apprehension   neg      neg       Relocation   no change     no change      Jerk test  neg     neg    EXTREMITY NEURO-VASCULAR EXAM:    Sensation grossly intact to light touch all dermatomal regions.    DTR 2+ Biceps, Triceps, BR and Negative Joses sign   Grossly intact motor function at Elbow, Wrist and Hand   Distal pulses radial and ulnar 2+, brisk cap refill, symmetric.      NECK:  Painless FROM and spinous processes non-tender. Negative Spurlings sign.      OTHER FINDINGS:  *** scapular dyskinesia    XRAYS:  Xrays including AP, Outlet and Axillary Lateral of shoulder are ordered / images reviewed by me:   No fracture dislocation or other pathology   Acromion type ***   Proximal migration of humeral head: None   GH arthritis: None          ASSESSMENT:   Right shoulder pain:  No diagnosis found.    PLAN:      1. MRI to rule out ***  2. Follow up in clinic to discuss MRI results    All questions were answered, patient will contact us for questions or concerns in the interim.             [1]   Current Outpatient Medications:     buPROPion (WELLBUTRIN XL) 300 MG 24 hr tablet, Take 1 tablet (300 mg total) by mouth once daily., Disp: 90 tablet, Rfl: 3    meloxicam (MOBIC) 15 MG tablet, Take 1 tablet (15 mg total) by mouth once daily. Take with food or pepcid otc, Disp: 30 tablet, Rfl: 2

## 2025-07-19 ENCOUNTER — OFFICE VISIT (OUTPATIENT)
Dept: URGENT CARE | Facility: CLINIC | Age: 50
End: 2025-07-19
Payer: COMMERCIAL

## 2025-07-19 VITALS
OXYGEN SATURATION: 98 % | TEMPERATURE: 99 F | BODY MASS INDEX: 32.08 KG/M2 | HEART RATE: 64 BPM | WEIGHT: 250 LBS | DIASTOLIC BLOOD PRESSURE: 81 MMHG | SYSTOLIC BLOOD PRESSURE: 122 MMHG | RESPIRATION RATE: 16 BRPM | HEIGHT: 74 IN

## 2025-07-19 DIAGNOSIS — J02.9 ACUTE VIRAL PHARYNGITIS: ICD-10-CM

## 2025-07-19 DIAGNOSIS — J06.9 ACUTE URI: ICD-10-CM

## 2025-07-19 DIAGNOSIS — K12.2 UVULITIS: ICD-10-CM

## 2025-07-19 DIAGNOSIS — J02.9 SORE THROAT: Primary | ICD-10-CM

## 2025-07-19 LAB
CTP QC/QA: YES
CTP QC/QA: YES
MOLECULAR STREP A: NEGATIVE
SARS-COV+SARS-COV-2 AG RESP QL IA.RAPID: NEGATIVE

## 2025-07-19 PROCEDURE — 87651 STREP A DNA AMP PROBE: CPT | Mod: QW,S$GLB,, | Performed by: FAMILY MEDICINE

## 2025-07-19 PROCEDURE — 99214 OFFICE O/P EST MOD 30 MIN: CPT | Mod: S$GLB,,, | Performed by: FAMILY MEDICINE

## 2025-07-19 PROCEDURE — 87811 SARS-COV-2 COVID19 W/OPTIC: CPT | Mod: QW,S$GLB,, | Performed by: FAMILY MEDICINE

## 2025-07-19 NOTE — PROGRESS NOTES
"Subjective:      Patient ID: Ryder Mendoza is a 50 y.o. male.    Vitals:  height is 6' 2" (1.88 m) and weight is 113.4 kg (250 lb). His tympanic temperature is 98.5 °F (36.9 °C). His blood pressure is 122/81 and his pulse is 64. His respiration is 16 and oxygen saturation is 98%.     Chief Complaint: Sore Throat    Patient presents with c.o sore throat that presented upon waking. Patient found his uvula looked swollen. Denies stridor, drooling, cough, congestion, fever, chills, body aches, chest pain, SOB,, nausea, diarrhea, weakness. Agreed to covid and strep testing in clinic. No meds taken otc.      Sore Throat   This is a new problem. The current episode started today. Neither side of throat is experiencing more pain than the other. There has been no fever. Pertinent negatives include no congestion, coughing or headaches. He has tried nothing for the symptoms.       HENT:  Positive for sore throat. Negative for congestion.    Respiratory:  Negative for cough.    Neurological:  Negative for headaches.      Objective:     Physical Exam   Constitutional: He is oriented to person, place, and time. He appears well-developed. He is cooperative.  Non-toxic appearance. He does not appear ill. No distress.   HENT:   Head: Normocephalic and atraumatic.   Ears:   Right Ear: Hearing, tympanic membrane, external ear and ear canal normal. no impacted cerumen  Left Ear: Hearing, tympanic membrane, external ear and ear canal normal. no impacted cerumen  Nose: Nose normal. No mucosal edema, rhinorrhea, nasal deformity or congestion. No epistaxis. Right sinus exhibits no maxillary sinus tenderness and no frontal sinus tenderness. Left sinus exhibits no maxillary sinus tenderness and no frontal sinus tenderness.   Mouth/Throat: Uvula is midline, oropharynx is clear and moist and mucous membranes are normal. No trismus in the jaw. Normal dentition. No uvula swelling. No oropharyngeal exudate or posterior oropharyngeal edema.    "   Comments:   +ve pharyngeal erythema w/o tonsillar swelling or exudates.  +ve uvular erythema and mild swelling.        Eyes: Conjunctivae and lids are normal. No scleral icterus.   Neck: Trachea normal and phonation normal. Neck supple. No edema present. No erythema present. No neck rigidity present.   Cardiovascular: Normal rate, regular rhythm, normal heart sounds and normal pulses.   No murmur heard.  Pulmonary/Chest: Effort normal and breath sounds normal. No stridor. No respiratory distress. He has no decreased breath sounds. He has no wheezes. He has no rhonchi. He has no rales.   Abdominal: Normal appearance. He exhibits no distension. There is no abdominal tenderness.   Musculoskeletal: Normal range of motion.         General: No deformity. Normal range of motion.      Cervical back: He exhibits no tenderness.   Lymphadenopathy:     He has no cervical adenopathy.   Neurological: He is alert, oriented to person, place, and time and at baseline. He exhibits normal muscle tone. Coordination normal.   Skin: Skin is warm, dry, intact, not diaphoretic and not pale.   Psychiatric: His speech is normal and behavior is normal. Judgment and thought content normal.   Nursing note and vitals reviewed.      Assessment:     1. Sore throat    2. Uvulitis    3. Acute URI    4. Acute viral pharyngitis        Plan:   Discussed exam findings/results/diagnosis/plan with patient. Advised to f/u with PCP within 2-5 days. ER precautions given if symptoms get any worse. All questions answered. Patient verbally understood and agreed with treatment plan.  Educational materials and instructions regarding the visit diagnosis and management provided.     Sore throat  -     SARS Coronavirus 2 Antigen, POCT Manual Read  -     POCT Strep A, Molecular    Uvulitis    Acute URI    Acute viral pharyngitis      Your sample(s) was tested for COVID-19 using the [employer] COVID-19 Antigen Card    If you have tested positive in our clinic today,  a positive test result means that the virus that causes COVID-19 was detected in your sample and are contagious.    If you have tested negative in our clinic today, to increase the chance that the negative result for COVID-19 is accurate, you should:     Test again in 48 hours if you have symptoms on the first day of testing.   Test 2 more times at least 48 hours apart if you do not have symptoms on the first day of testing.     A negative test result indicates that the virus that causes COVID-19 was not detected in your sample. A negative result is presumptive, meaning it is not certain that you do not have COVID-19. You may still have COVID-19 and you may still be contagious. There is a higher chance of false negative results with antigen tests compared to laboratory-based tests such as PCR. If you tested negative and continue to experience COVID-19-like symptoms, e.g., fever, cough, and/or shortness of breath, you should seek follow up care with your health care provider.

## 2025-08-12 ENCOUNTER — PATIENT MESSAGE (OUTPATIENT)
Dept: SLEEP MEDICINE | Facility: CLINIC | Age: 50
End: 2025-08-12
Payer: COMMERCIAL

## 2025-08-25 ENCOUNTER — PATIENT MESSAGE (OUTPATIENT)
Dept: OTOLARYNGOLOGY | Facility: CLINIC | Age: 50
End: 2025-08-25
Payer: COMMERCIAL

## 2025-08-26 ENCOUNTER — OFFICE VISIT (OUTPATIENT)
Dept: OTOLARYNGOLOGY | Facility: CLINIC | Age: 50
End: 2025-08-26
Payer: COMMERCIAL

## 2025-08-26 ENCOUNTER — CLINICAL SUPPORT (OUTPATIENT)
Facility: CLINIC | Age: 50
End: 2025-08-26
Payer: COMMERCIAL

## 2025-08-26 VITALS
BODY MASS INDEX: 34.11 KG/M2 | HEART RATE: 73 BPM | SYSTOLIC BLOOD PRESSURE: 121 MMHG | DIASTOLIC BLOOD PRESSURE: 81 MMHG | WEIGHT: 265.69 LBS

## 2025-08-26 DIAGNOSIS — H91.90 HEARING LOSS, UNSPECIFIED HEARING LOSS TYPE, UNSPECIFIED LATERALITY: Primary | ICD-10-CM

## 2025-08-26 DIAGNOSIS — H74.11: Primary | ICD-10-CM

## 2025-08-26 DIAGNOSIS — Z97.4 WEARS HEARING AID IN BOTH EARS: ICD-10-CM

## 2025-08-26 DIAGNOSIS — H91.8X3 ASYMMETRICAL HEARING LOSS: ICD-10-CM

## 2025-08-26 DIAGNOSIS — H90.6 MIXED CONDUCTIVE AND SENSORINEURAL HEARING LOSS OF BOTH EARS: ICD-10-CM

## 2025-08-26 DIAGNOSIS — R94.120 ABNORMALLY COMPLIANT LEFT MIDDLE EAR SYSTEM WITH TYPE AD TYMPANOGRAM CURVE: ICD-10-CM

## 2025-08-26 DIAGNOSIS — H90.6 MIXED CONDUCTIVE AND SENSORINEURAL HEARING LOSS OF BOTH EARS: Primary | ICD-10-CM

## 2025-08-26 PROCEDURE — 3079F DIAST BP 80-89 MM HG: CPT | Mod: CPTII,S$GLB,, | Performed by: NURSE PRACTITIONER

## 2025-08-26 PROCEDURE — 3008F BODY MASS INDEX DOCD: CPT | Mod: CPTII,S$GLB,, | Performed by: NURSE PRACTITIONER

## 2025-08-26 PROCEDURE — 92567 TYMPANOMETRY: CPT | Mod: S$GLB,,, | Performed by: AUDIOLOGIST-HEARING AID FITTER

## 2025-08-26 PROCEDURE — 92557 COMPREHENSIVE HEARING TEST: CPT | Mod: S$GLB,,, | Performed by: AUDIOLOGIST-HEARING AID FITTER

## 2025-08-26 PROCEDURE — 3074F SYST BP LT 130 MM HG: CPT | Mod: CPTII,S$GLB,, | Performed by: NURSE PRACTITIONER

## 2025-08-26 PROCEDURE — 99214 OFFICE O/P EST MOD 30 MIN: CPT | Mod: S$GLB,,, | Performed by: NURSE PRACTITIONER

## 2025-08-28 ENCOUNTER — OFFICE VISIT (OUTPATIENT)
Dept: SPORTS MEDICINE | Facility: CLINIC | Age: 50
End: 2025-08-28
Payer: COMMERCIAL

## 2025-08-28 VITALS
BODY MASS INDEX: 33.97 KG/M2 | WEIGHT: 264.56 LBS | SYSTOLIC BLOOD PRESSURE: 133 MMHG | HEART RATE: 71 BPM | DIASTOLIC BLOOD PRESSURE: 84 MMHG

## 2025-08-28 DIAGNOSIS — M12.811 RIGHT ROTATOR CUFF TEAR ARTHROPATHY: Primary | ICD-10-CM

## 2025-08-28 DIAGNOSIS — M25.511 CHRONIC RIGHT SHOULDER PAIN: ICD-10-CM

## 2025-08-28 DIAGNOSIS — M75.101 RIGHT ROTATOR CUFF TEAR ARTHROPATHY: Primary | ICD-10-CM

## 2025-08-28 DIAGNOSIS — G89.29 CHRONIC RIGHT SHOULDER PAIN: ICD-10-CM

## 2025-08-28 PROCEDURE — 1159F MED LIST DOCD IN RCRD: CPT | Mod: CPTII,S$GLB,, | Performed by: ORTHOPAEDIC SURGERY

## 2025-08-28 PROCEDURE — 99999 PR PBB SHADOW E&M-EST. PATIENT-LVL III: CPT | Mod: PBBFAC,,, | Performed by: ORTHOPAEDIC SURGERY

## 2025-08-28 PROCEDURE — 3008F BODY MASS INDEX DOCD: CPT | Mod: CPTII,S$GLB,, | Performed by: ORTHOPAEDIC SURGERY

## 2025-08-28 PROCEDURE — 3079F DIAST BP 80-89 MM HG: CPT | Mod: CPTII,S$GLB,, | Performed by: ORTHOPAEDIC SURGERY

## 2025-08-28 PROCEDURE — 99213 OFFICE O/P EST LOW 20 MIN: CPT | Mod: S$GLB,,, | Performed by: ORTHOPAEDIC SURGERY

## 2025-08-28 PROCEDURE — 3075F SYST BP GE 130 - 139MM HG: CPT | Mod: CPTII,S$GLB,, | Performed by: ORTHOPAEDIC SURGERY

## 2025-08-31 DIAGNOSIS — F32.A DEPRESSION, UNSPECIFIED DEPRESSION TYPE: ICD-10-CM

## 2025-09-02 RX ORDER — BUPROPION HYDROCHLORIDE 300 MG/1
300 TABLET ORAL DAILY
Qty: 90 TABLET | Refills: 3 | Status: SHIPPED | OUTPATIENT
Start: 2025-09-02